# Patient Record
Sex: MALE | Race: BLACK OR AFRICAN AMERICAN | Employment: OTHER | URBAN - METROPOLITAN AREA
[De-identification: names, ages, dates, MRNs, and addresses within clinical notes are randomized per-mention and may not be internally consistent; named-entity substitution may affect disease eponyms.]

---

## 2017-08-22 ENCOUNTER — APPOINTMENT (OUTPATIENT)
Dept: INTERVENTIONAL RADIOLOGY/VASCULAR | Age: 82
DRG: 698 | End: 2017-08-22
Attending: INTERNAL MEDICINE
Payer: MEDICARE

## 2017-08-22 ENCOUNTER — HOSPITAL ENCOUNTER (INPATIENT)
Age: 82
LOS: 11 days | Discharge: NURSING, REHAB OR LONG-TERM CARE | DRG: 698 | End: 2017-09-02
Attending: EMERGENCY MEDICINE | Admitting: INTERNAL MEDICINE
Payer: MEDICARE

## 2017-08-22 ENCOUNTER — APPOINTMENT (OUTPATIENT)
Dept: ULTRASOUND IMAGING | Age: 82
DRG: 698 | End: 2017-08-22
Attending: INTERNAL MEDICINE
Payer: MEDICARE

## 2017-08-22 ENCOUNTER — APPOINTMENT (OUTPATIENT)
Dept: GENERAL RADIOLOGY | Age: 82
DRG: 698 | End: 2017-08-22
Attending: EMERGENCY MEDICINE
Payer: MEDICARE

## 2017-08-22 ENCOUNTER — APPOINTMENT (OUTPATIENT)
Dept: INTERVENTIONAL RADIOLOGY/VASCULAR | Age: 82
DRG: 698 | End: 2017-08-22
Attending: EMERGENCY MEDICINE
Payer: MEDICARE

## 2017-08-22 DIAGNOSIS — R06.02 SHORTNESS OF BREATH: ICD-10-CM

## 2017-08-22 DIAGNOSIS — R63.30 FEEDING DIFFICULTIES: ICD-10-CM

## 2017-08-22 DIAGNOSIS — N39.0 URINARY TRACT INFECTION WITHOUT HEMATURIA, SITE UNSPECIFIED: ICD-10-CM

## 2017-08-22 DIAGNOSIS — Z66 DO NOT RESUSCITATE: ICD-10-CM

## 2017-08-22 DIAGNOSIS — E87.5 ACUTE HYPERKALEMIA: ICD-10-CM

## 2017-08-22 DIAGNOSIS — A41.9 SEPSIS, DUE TO UNSPECIFIED ORGANISM: Primary | ICD-10-CM

## 2017-08-22 DIAGNOSIS — R53.81 DEBILITY: ICD-10-CM

## 2017-08-22 DIAGNOSIS — L89.90 PRESSURE ULCER, UNSPECIFIED LOCATION, UNSPECIFIED ULCER STAGE: ICD-10-CM

## 2017-08-22 DIAGNOSIS — N17.9 ACUTE RENAL FAILURE, UNSPECIFIED ACUTE RENAL FAILURE TYPE (HCC): ICD-10-CM

## 2017-08-22 PROBLEM — T83.511A UTI (URINARY TRACT INFECTION) DUE TO URINARY INDWELLING CATHETER (HCC): Status: ACTIVE | Noted: 2017-08-22

## 2017-08-22 LAB
ALBUMIN SERPL-MCNC: 1.7 G/DL (ref 3.5–5)
ALBUMIN/GLOB SERPL: 0.3 {RATIO} (ref 1.1–2.2)
ALP SERPL-CCNC: 333 U/L (ref 45–117)
ALT SERPL-CCNC: 763 U/L (ref 12–78)
ANION GAP SERPL CALC-SCNC: 10 MMOL/L (ref 5–15)
ANION GAP SERPL CALC-SCNC: 6 MMOL/L (ref 5–15)
ANION GAP SERPL CALC-SCNC: 7 MMOL/L (ref 5–15)
APPEARANCE UR: ABNORMAL
ARTERIAL PATENCY WRIST A: YES
AST SERPL-CCNC: 893 U/L (ref 15–37)
ATRIAL RATE: 74 BPM
BACTERIA URNS QL MICRO: ABNORMAL /HPF
BASE EXCESS BLD CALC-SCNC: 1 MMOL/L
BASOPHILS # BLD: 0 K/UL (ref 0–0.1)
BASOPHILS NFR BLD: 0 % (ref 0–1)
BDY SITE: ABNORMAL
BILIRUB SERPL-MCNC: 0.5 MG/DL (ref 0.2–1)
BILIRUB UR QL: NEGATIVE
BUN SERPL-MCNC: 167 MG/DL (ref 6–20)
BUN SERPL-MCNC: 181 MG/DL (ref 6–20)
BUN SERPL-MCNC: 59 MG/DL (ref 6–20)
BUN/CREAT SERPL: 31 (ref 12–20)
BUN/CREAT SERPL: 37 (ref 12–20)
BUN/CREAT SERPL: 38 (ref 12–20)
CALCIUM SERPL-MCNC: 7.7 MG/DL (ref 8.5–10.1)
CALCIUM SERPL-MCNC: 8.4 MG/DL (ref 8.5–10.1)
CALCIUM SERPL-MCNC: 8.9 MG/DL (ref 8.5–10.1)
CALCULATED R AXIS, ECG10: -74 DEGREES
CALCULATED T AXIS, ECG11: 51 DEGREES
CHLORIDE SERPL-SCNC: 105 MMOL/L (ref 97–108)
CHLORIDE SERPL-SCNC: 123 MMOL/L (ref 97–108)
CHLORIDE SERPL-SCNC: 128 MMOL/L (ref 97–108)
CK SERPL-CCNC: 25 U/L (ref 39–308)
CO2 SERPL-SCNC: 25 MMOL/L (ref 21–32)
CO2 SERPL-SCNC: 26 MMOL/L (ref 21–32)
CO2 SERPL-SCNC: 28 MMOL/L (ref 21–32)
COLOR UR: ABNORMAL
CREAT SERPL-MCNC: 1.9 MG/DL (ref 0.7–1.3)
CREAT SERPL-MCNC: 4.52 MG/DL (ref 0.7–1.3)
CREAT SERPL-MCNC: 4.71 MG/DL (ref 0.7–1.3)
DIAGNOSIS, 93000: NORMAL
DIFFERENTIAL METHOD BLD: ABNORMAL
EOSINOPHIL # BLD: 0.2 K/UL (ref 0–0.4)
EOSINOPHIL NFR BLD: 1 % (ref 0–7)
EPITH CASTS URNS QL MICRO: ABNORMAL /LPF
ERYTHROCYTE [DISTWIDTH] IN BLOOD BY AUTOMATED COUNT: 17.3 % (ref 11.5–14.5)
GAS FLOW.O2 O2 DELIVERY SYS: ABNORMAL L/MIN
GAS FLOW.O2 SETTING OXYMISER: 14 BPM
GLOBULIN SER CALC-MCNC: 6.8 G/DL (ref 2–4)
GLUCOSE SERPL-MCNC: 135 MG/DL (ref 65–100)
GLUCOSE SERPL-MCNC: 137 MG/DL (ref 65–100)
GLUCOSE SERPL-MCNC: 192 MG/DL (ref 65–100)
GLUCOSE UR STRIP.AUTO-MCNC: NEGATIVE MG/DL
HCO3 BLD-SCNC: 26 MMOL/L (ref 22–26)
HCT VFR BLD AUTO: 33.1 % (ref 36.6–50.3)
HGB BLD-MCNC: 9.5 G/DL (ref 12.1–17)
HGB UR QL STRIP: ABNORMAL
KETONES UR QL STRIP.AUTO: NEGATIVE MG/DL
LACTATE SERPL-SCNC: 2.9 MMOL/L (ref 0.4–2)
LEUKOCYTE ESTERASE UR QL STRIP.AUTO: ABNORMAL
LYMPHOCYTES # BLD: 1.8 K/UL (ref 0.8–3.5)
LYMPHOCYTES NFR BLD: 9 % (ref 12–49)
MAGNESIUM SERPL-MCNC: 2.9 MG/DL (ref 1.6–2.4)
MCH RBC QN AUTO: 27.4 PG (ref 26–34)
MCHC RBC AUTO-ENTMCNC: 28.7 G/DL (ref 30–36.5)
MCV RBC AUTO: 95.4 FL (ref 80–99)
MONOCYTES # BLD: 1 K/UL (ref 0–1)
MONOCYTES NFR BLD: 5 % (ref 5–13)
NEUTS BAND NFR BLD MANUAL: 1 % (ref 0–6)
NEUTS SEG # BLD: 17.2 K/UL (ref 1.8–8)
NEUTS SEG NFR BLD: 84 % (ref 32–75)
NITRITE UR QL STRIP.AUTO: NEGATIVE
O2/TOTAL GAS SETTING VFR VENT: 100 %
PCO2 BLD: 42.6 MMHG (ref 35–45)
PEEP RESPIRATORY: 5 CMH2O
PH BLD: 7.39 [PH] (ref 7.35–7.45)
PH UR STRIP: 7 [PH] (ref 5–8)
PHOSPHATE SERPL-MCNC: 4.9 MG/DL (ref 2.6–4.7)
PLATELET # BLD AUTO: 413 K/UL (ref 150–400)
PO2 BLD: 296 MMHG (ref 80–100)
POTASSIUM SERPL-SCNC: 4.1 MMOL/L (ref 3.5–5.1)
POTASSIUM SERPL-SCNC: 6.8 MMOL/L (ref 3.5–5.1)
POTASSIUM SERPL-SCNC: 8.6 MMOL/L (ref 3.5–5.1)
PROT SERPL-MCNC: 8.5 G/DL (ref 6.4–8.2)
PROT UR STRIP-MCNC: 100 MG/DL
Q-T INTERVAL, ECG07: 466 MS
QRS DURATION, ECG06: 144 MS
QTC CALCULATION (BEZET), ECG08: 469 MS
RBC # BLD AUTO: 3.47 M/UL (ref 4.1–5.7)
RBC #/AREA URNS HPF: ABNORMAL /HPF (ref 0–5)
RBC MORPH BLD: ABNORMAL
SAO2 % BLD: 100 % (ref 92–97)
SODIUM SERPL-SCNC: 143 MMOL/L (ref 136–145)
SODIUM SERPL-SCNC: 155 MMOL/L (ref 136–145)
SODIUM SERPL-SCNC: 160 MMOL/L (ref 136–145)
SP GR UR REFRACTOMETRY: 1.01 (ref 1–1.03)
SPECIMEN TYPE: ABNORMAL
TROPONIN I SERPL-MCNC: 0.08 NG/ML
UA: UC IF INDICATED,UAUC: ABNORMAL
UROBILINOGEN UR QL STRIP.AUTO: 0.2 EU/DL (ref 0.2–1)
VENTILATION MODE VENT: ABNORMAL
VENTRICULAR RATE, ECG03: 61 BPM
VOLUME CONTROL IVLC: YES
VT SETTING VENT: 500 ML
WBC # BLD AUTO: 20.2 K/UL (ref 4.1–11.1)
WBC URNS QL MICRO: ABNORMAL /HPF (ref 0–4)

## 2017-08-22 PROCEDURE — 77030013140 HC MSK NEB VYRM -A

## 2017-08-22 PROCEDURE — 36556 INSERT NON-TUNNEL CV CATH: CPT

## 2017-08-22 PROCEDURE — 93005 ELECTROCARDIOGRAM TRACING: CPT

## 2017-08-22 PROCEDURE — 74011636637 HC RX REV CODE- 636/637: Performed by: EMERGENCY MEDICINE

## 2017-08-22 PROCEDURE — 74011000250 HC RX REV CODE- 250: Performed by: EMERGENCY MEDICINE

## 2017-08-22 PROCEDURE — 02HV33Z INSERTION OF INFUSION DEVICE INTO SUPERIOR VENA CAVA, PERCUTANEOUS APPROACH: ICD-10-PCS | Performed by: INTERNAL MEDICINE

## 2017-08-22 PROCEDURE — 74011250636 HC RX REV CODE- 250/636: Performed by: INTERNAL MEDICINE

## 2017-08-22 PROCEDURE — 96374 THER/PROPH/DIAG INJ IV PUSH: CPT

## 2017-08-22 PROCEDURE — 71010 XR CHEST PORT: CPT

## 2017-08-22 PROCEDURE — 94640 AIRWAY INHALATION TREATMENT: CPT

## 2017-08-22 PROCEDURE — 74011250636 HC RX REV CODE- 250/636

## 2017-08-22 PROCEDURE — 74011000250 HC RX REV CODE- 250: Performed by: INTERNAL MEDICINE

## 2017-08-22 PROCEDURE — 96365 THER/PROPH/DIAG IV INF INIT: CPT

## 2017-08-22 PROCEDURE — 77030034850

## 2017-08-22 PROCEDURE — 87040 BLOOD CULTURE FOR BACTERIA: CPT | Performed by: EMERGENCY MEDICINE

## 2017-08-22 PROCEDURE — 96375 TX/PRO/DX INJ NEW DRUG ADDON: CPT

## 2017-08-22 PROCEDURE — 74011250636 HC RX REV CODE- 250/636: Performed by: EMERGENCY MEDICINE

## 2017-08-22 PROCEDURE — 81001 URINALYSIS AUTO W/SCOPE: CPT | Performed by: EMERGENCY MEDICINE

## 2017-08-22 PROCEDURE — 87077 CULTURE AEROBIC IDENTIFY: CPT | Performed by: EMERGENCY MEDICINE

## 2017-08-22 PROCEDURE — 90935 HEMODIALYSIS ONE EVALUATION: CPT | Performed by: NURSE PRACTITIONER

## 2017-08-22 PROCEDURE — C1892 INTRO/SHEATH,FIXED,PEEL-AWAY: HCPCS

## 2017-08-22 PROCEDURE — 74011250637 HC RX REV CODE- 250/637: Performed by: INTERNAL MEDICINE

## 2017-08-22 PROCEDURE — 85025 COMPLETE CBC W/AUTO DIFF WBC: CPT | Performed by: EMERGENCY MEDICINE

## 2017-08-22 PROCEDURE — 87205 SMEAR GRAM STAIN: CPT | Performed by: EMERGENCY MEDICINE

## 2017-08-22 PROCEDURE — 5A1955Z RESPIRATORY VENTILATION, GREATER THAN 96 CONSECUTIVE HOURS: ICD-10-PCS | Performed by: EMERGENCY MEDICINE

## 2017-08-22 PROCEDURE — 80048 BASIC METABOLIC PNL TOTAL CA: CPT | Performed by: INTERNAL MEDICINE

## 2017-08-22 PROCEDURE — 84100 ASSAY OF PHOSPHORUS: CPT | Performed by: INTERNAL MEDICINE

## 2017-08-22 PROCEDURE — 80053 COMPREHEN METABOLIC PANEL: CPT | Performed by: EMERGENCY MEDICINE

## 2017-08-22 PROCEDURE — 36591 DRAW BLOOD OFF VENOUS DEVICE: CPT

## 2017-08-22 PROCEDURE — 83605 ASSAY OF LACTIC ACID: CPT | Performed by: EMERGENCY MEDICINE

## 2017-08-22 PROCEDURE — 76770 US EXAM ABDO BACK WALL COMP: CPT

## 2017-08-22 PROCEDURE — 36600 WITHDRAWAL OF ARTERIAL BLOOD: CPT

## 2017-08-22 PROCEDURE — 94002 VENT MGMT INPAT INIT DAY: CPT

## 2017-08-22 PROCEDURE — 87086 URINE CULTURE/COLONY COUNT: CPT | Performed by: EMERGENCY MEDICINE

## 2017-08-22 PROCEDURE — 77030018719 HC DRSG PTCH ANTIMIC J&J -A

## 2017-08-22 PROCEDURE — 5A1D00Z PERFORMANCE OF URINARY FILTRATION, SINGLE: ICD-10-PCS | Performed by: INTERNAL MEDICINE

## 2017-08-22 PROCEDURE — 86923 COMPATIBILITY TEST ELECTRIC: CPT | Performed by: HOSPITALIST

## 2017-08-22 PROCEDURE — 86900 BLOOD TYPING SEROLOGIC ABO: CPT | Performed by: HOSPITALIST

## 2017-08-22 PROCEDURE — 05HM33Z INSERTION OF INFUSION DEVICE INTO RIGHT INTERNAL JUGULAR VEIN, PERCUTANEOUS APPROACH: ICD-10-PCS | Performed by: INTERNAL MEDICINE

## 2017-08-22 PROCEDURE — C1752 CATH,HEMODIALYSIS,SHORT-TERM: HCPCS

## 2017-08-22 PROCEDURE — 96361 HYDRATE IV INFUSION ADD-ON: CPT

## 2017-08-22 PROCEDURE — 82803 BLOOD GASES ANY COMBINATION: CPT

## 2017-08-22 PROCEDURE — 87070 CULTURE OTHR SPECIMN AEROBIC: CPT | Performed by: EMERGENCY MEDICINE

## 2017-08-22 PROCEDURE — 74011250637 HC RX REV CODE- 250/637: Performed by: SURGERY

## 2017-08-22 PROCEDURE — 77030011256 HC DRSG MEPILEX <16IN NO BORD MOLN -A

## 2017-08-22 PROCEDURE — 74011000258 HC RX REV CODE- 258: Performed by: INTERNAL MEDICINE

## 2017-08-22 PROCEDURE — 87186 SC STD MICRODIL/AGAR DIL: CPT | Performed by: EMERGENCY MEDICINE

## 2017-08-22 PROCEDURE — 82550 ASSAY OF CK (CPK): CPT | Performed by: INTERNAL MEDICINE

## 2017-08-22 PROCEDURE — 84484 ASSAY OF TROPONIN QUANT: CPT | Performed by: EMERGENCY MEDICINE

## 2017-08-22 PROCEDURE — 36415 COLL VENOUS BLD VENIPUNCTURE: CPT | Performed by: INTERNAL MEDICINE

## 2017-08-22 PROCEDURE — 51702 INSERT TEMP BLADDER CATH: CPT

## 2017-08-22 PROCEDURE — 74011000258 HC RX REV CODE- 258: Performed by: EMERGENCY MEDICINE

## 2017-08-22 PROCEDURE — 99285 EMERGENCY DEPT VISIT HI MDM: CPT

## 2017-08-22 PROCEDURE — 96367 TX/PROPH/DG ADDL SEQ IV INF: CPT

## 2017-08-22 PROCEDURE — 74011000250 HC RX REV CODE- 250

## 2017-08-22 PROCEDURE — 83735 ASSAY OF MAGNESIUM: CPT | Performed by: INTERNAL MEDICINE

## 2017-08-22 PROCEDURE — C1751 CATH, INF, PER/CENT/MIDLINE: HCPCS

## 2017-08-22 PROCEDURE — 65610000006 HC RM INTENSIVE CARE

## 2017-08-22 RX ORDER — ATROPINE SULFATE 10 MG/ML
2 SOLUTION/ DROPS OPHTHALMIC
COMMUNITY

## 2017-08-22 RX ORDER — DEXTROSE 50 % IN WATER (D50W) INTRAVENOUS SYRINGE
25
Status: COMPLETED | OUTPATIENT
Start: 2017-08-22 | End: 2017-08-22

## 2017-08-22 RX ORDER — PROMETHAZINE HYDROCHLORIDE 25 MG/1
25 SUPPOSITORY RECTAL
COMMUNITY

## 2017-08-22 RX ORDER — SODIUM POLYSTYRENE SULFONATE 15 G/60ML
45 SUSPENSION ORAL; RECTAL
Status: DISPENSED | OUTPATIENT
Start: 2017-08-22 | End: 2017-08-23

## 2017-08-22 RX ORDER — GUAIFENESIN 100 MG/5ML
81 LIQUID (ML) ORAL DAILY
Status: DISCONTINUED | OUTPATIENT
Start: 2017-08-23 | End: 2017-09-02 | Stop reason: HOSPADM

## 2017-08-22 RX ORDER — HEPARIN SODIUM 5000 [USP'U]/ML
5000 INJECTION, SOLUTION INTRAVENOUS; SUBCUTANEOUS EVERY 8 HOURS
Status: DISCONTINUED | OUTPATIENT
Start: 2017-08-22 | End: 2017-09-02 | Stop reason: HOSPADM

## 2017-08-22 RX ORDER — LIDOCAINE HYDROCHLORIDE 20 MG/ML
INJECTION, SOLUTION INFILTRATION; PERINEURAL
Status: COMPLETED
Start: 2017-08-22 | End: 2017-08-22

## 2017-08-22 RX ORDER — HYDROCODONE BITARTRATE AND ACETAMINOPHEN 5; 325 MG/1; MG/1
1 TABLET ORAL
Status: DISCONTINUED | OUTPATIENT
Start: 2017-08-22 | End: 2017-09-02 | Stop reason: HOSPADM

## 2017-08-22 RX ORDER — IPRATROPIUM BROMIDE AND ALBUTEROL SULFATE 2.5; .5 MG/3ML; MG/3ML
3 SOLUTION RESPIRATORY (INHALATION) EVERY 6 HOURS
COMMUNITY

## 2017-08-22 RX ORDER — SODIUM CHLORIDE 0.9 % (FLUSH) 0.9 %
5-10 SYRINGE (ML) INJECTION EVERY 8 HOURS
Status: DISCONTINUED | OUTPATIENT
Start: 2017-08-22 | End: 2017-09-02 | Stop reason: HOSPADM

## 2017-08-22 RX ORDER — LORAZEPAM 0.5 MG/1
0.5 TABLET ORAL
COMMUNITY

## 2017-08-22 RX ORDER — SODIUM CHLORIDE 0.9 % (FLUSH) 0.9 %
5-10 SYRINGE (ML) INJECTION AS NEEDED
Status: DISCONTINUED | OUTPATIENT
Start: 2017-08-22 | End: 2017-09-02 | Stop reason: HOSPADM

## 2017-08-22 RX ORDER — ACETAMINOPHEN 650 MG/1
650 SUPPOSITORY RECTAL
Status: DISCONTINUED | OUTPATIENT
Start: 2017-08-22 | End: 2017-09-02 | Stop reason: HOSPADM

## 2017-08-22 RX ORDER — ONDANSETRON 4 MG/1
4 TABLET, ORALLY DISINTEGRATING ORAL
Status: DISCONTINUED | OUTPATIENT
Start: 2017-08-22 | End: 2017-09-02 | Stop reason: HOSPADM

## 2017-08-22 RX ORDER — GUAIFENESIN 100 MG/5ML
81 LIQUID (ML) ORAL DAILY
COMMUNITY

## 2017-08-22 RX ORDER — SODIUM BICARBONATE 1 MEQ/ML
50 SYRINGE (ML) INTRAVENOUS
Status: COMPLETED | OUTPATIENT
Start: 2017-08-22 | End: 2017-08-22

## 2017-08-22 RX ORDER — ACETAMINOPHEN 650 MG/1
650 SUPPOSITORY RECTAL
COMMUNITY

## 2017-08-22 RX ORDER — ALBUTEROL SULFATE 0.83 MG/ML
2.5 SOLUTION RESPIRATORY (INHALATION)
Status: COMPLETED | OUTPATIENT
Start: 2017-08-22 | End: 2017-08-22

## 2017-08-22 RX ORDER — LIDOCAINE HYDROCHLORIDE 20 MG/ML
20 INJECTION, SOLUTION INFILTRATION; PERINEURAL
Status: COMPLETED | OUTPATIENT
Start: 2017-08-22 | End: 2017-08-22

## 2017-08-22 RX ORDER — CHLORHEXIDINE GLUCONATE 1.2 MG/ML
15 RINSE ORAL
COMMUNITY

## 2017-08-22 RX ORDER — ATORVASTATIN CALCIUM 40 MG/1
40 TABLET, FILM COATED ORAL
COMMUNITY

## 2017-08-22 RX ORDER — ACETAMINOPHEN 325 MG/1
650 TABLET ORAL
Status: DISCONTINUED | OUTPATIENT
Start: 2017-08-22 | End: 2017-09-02 | Stop reason: HOSPADM

## 2017-08-22 RX ORDER — SODIUM CHLORIDE 9 MG/ML
100 INJECTION, SOLUTION INTRAVENOUS CONTINUOUS
Status: DISCONTINUED | OUTPATIENT
Start: 2017-08-22 | End: 2017-08-24

## 2017-08-22 RX ORDER — FACIAL-BODY WIPES
10 EACH TOPICAL
COMMUNITY

## 2017-08-22 RX ORDER — CALCIUM GLUCONATE 94 MG/ML
1 INJECTION, SOLUTION INTRAVENOUS
Status: DISCONTINUED | OUTPATIENT
Start: 2017-08-22 | End: 2017-08-22 | Stop reason: SDUPTHER

## 2017-08-22 RX ORDER — HEPARIN SODIUM 1000 [USP'U]/ML
INJECTION, SOLUTION INTRAVENOUS; SUBCUTANEOUS
Status: COMPLETED
Start: 2017-08-22 | End: 2017-08-22

## 2017-08-22 RX ORDER — HEPARIN SODIUM 1000 [USP'U]/ML
10000 INJECTION, SOLUTION INTRAVENOUS; SUBCUTANEOUS
Status: COMPLETED | OUTPATIENT
Start: 2017-08-22 | End: 2017-08-22

## 2017-08-22 RX ORDER — IPRATROPIUM BROMIDE AND ALBUTEROL SULFATE 2.5; .5 MG/3ML; MG/3ML
3 SOLUTION RESPIRATORY (INHALATION)
Status: DISCONTINUED | OUTPATIENT
Start: 2017-08-22 | End: 2017-09-02 | Stop reason: HOSPADM

## 2017-08-22 RX ORDER — ESOMEPRAZOLE MAGNESIUM 40 MG/1
40 FOR SUSPENSION ORAL DAILY
COMMUNITY

## 2017-08-22 RX ADMIN — LIDOCAINE HYDROCHLORIDE 5 MG: 20 INJECTION, SOLUTION INFILTRATION; PERINEURAL at 13:57

## 2017-08-22 RX ADMIN — ALBUTEROL SULFATE 2.5 MG: 2.5 SOLUTION RESPIRATORY (INHALATION) at 11:42

## 2017-08-22 RX ADMIN — NOREPINEPHRINE BITARTRATE 6 MCG/MIN: 1 INJECTION, SOLUTION, CONCENTRATE INTRAVENOUS at 15:16

## 2017-08-22 RX ADMIN — IPRATROPIUM BROMIDE AND ALBUTEROL SULFATE 3 ML: .5; 3 SOLUTION RESPIRATORY (INHALATION) at 21:51

## 2017-08-22 RX ADMIN — CALCIUM GLUCONATE 1 G: 94 INJECTION, SOLUTION INTRAVENOUS at 13:17

## 2017-08-22 RX ADMIN — Medication 10 ML: at 22:00

## 2017-08-22 RX ADMIN — DEXTROSE MONOHYDRATE 25 G: 25 INJECTION, SOLUTION INTRAVENOUS at 11:56

## 2017-08-22 RX ADMIN — NOREPINEPHRINE BITARTRATE 15 MCG/MIN: 1 INJECTION, SOLUTION, CONCENTRATE INTRAVENOUS at 22:59

## 2017-08-22 RX ADMIN — PIPERACILLIN SODIUM,TAZOBACTAM SODIUM 3.38 G: 3; .375 INJECTION, POWDER, FOR SOLUTION INTRAVENOUS at 10:49

## 2017-08-22 RX ADMIN — HEPARIN SODIUM 3000 UNITS: 1000 INJECTION, SOLUTION INTRAVENOUS; SUBCUTANEOUS at 13:57

## 2017-08-22 RX ADMIN — COLLAGENASE SANTYL: 250 OINTMENT TOPICAL at 19:16

## 2017-08-22 RX ADMIN — SODIUM CHLORIDE 1000 ML: 900 INJECTION, SOLUTION INTRAVENOUS at 13:13

## 2017-08-22 RX ADMIN — HEPARIN SODIUM 5000 UNITS: 5000 INJECTION, SOLUTION INTRAVENOUS; SUBCUTANEOUS at 22:59

## 2017-08-22 RX ADMIN — CALCIUM GLUCONATE 1 G: 94 INJECTION, SOLUTION INTRAVENOUS at 11:55

## 2017-08-22 RX ADMIN — Medication 10 ML: at 15:07

## 2017-08-22 RX ADMIN — HEPARIN SODIUM 5000 UNITS: 5000 INJECTION, SOLUTION INTRAVENOUS; SUBCUTANEOUS at 15:06

## 2017-08-22 RX ADMIN — PANTOPRAZOLE SODIUM 40 MG: 40 TABLET, DELAYED RELEASE ORAL at 16:06

## 2017-08-22 RX ADMIN — SODIUM CHLORIDE 1000 ML: 900 INJECTION, SOLUTION INTRAVENOUS at 10:48

## 2017-08-22 RX ADMIN — SODIUM CHLORIDE 100 ML/HR: 900 INJECTION, SOLUTION INTRAVENOUS at 19:14

## 2017-08-22 RX ADMIN — SODIUM CHLORIDE 1000 ML: 900 INJECTION, SOLUTION INTRAVENOUS at 11:33

## 2017-08-22 RX ADMIN — NOREPINEPHRINE BITARTRATE 4 MCG/MIN: 1 INJECTION, SOLUTION, CONCENTRATE INTRAVENOUS at 13:33

## 2017-08-22 RX ADMIN — SODIUM BICARBONATE 50 MEQ: 84 INJECTION INTRAVENOUS at 11:56

## 2017-08-22 RX ADMIN — HUMAN INSULIN 10 UNITS: 100 INJECTION, SOLUTION SUBCUTANEOUS at 11:55

## 2017-08-22 NOTE — CONSULTS
[facility]  Pulmonary, Critical Care, and Sleep Medicine    Initial Patient Consult    Name: Gil Tepmle MRN: [id number]   : 11/10/1933 Hospital: [facility]    Date: 2017        IMPRESSION:   · Sepsis- suspect from large sacral ulcer- at risk for MRSA/ESBLs etc  · Chronic trach with Acute VDRF- CXR clear. Basilar ATX. · FREDERICK on CKD  · Hyperkalemia  · H/o CABG  · H/o PEA arrest  · Chronic trach      RECOMMENDATIONS:   · Zosyn/levo/vanco  · Lung protective ventilation  · Surgery consult for wound  · contact isolation  · I have called renal for STAT HD  · analgosedation with PRN fent  · To ICU  · FULL CODE  · Pt is critically ill CCT EOP 30 min. Subjective: This patient has been seen and evaluated at the request of Dr. Heriberto Greene for ICU mgmt. Patient is a 80 y.o. male frm SNF with chronic trach sent to [facility] ED for low O2 sats and hypotension. Pt here with SBP into 70s and sats into 80s. Placed on vent. Given 3 liters NS ans zosyn. SBP now 110. On VACV. Obtunded on vent. Past Medical History:   Diagnosis Date    CAD (coronary artery disease)     Hx of CABG       History reviewed. No pertinent surgical history. Prior to Admission medications    Medication Sig Start Date End Date Taking? Authorizing Provider   promethazine (PHENERGAN) 25 mg suppository Insert 25 mg into rectum every four (4) hours as needed for Nausea. Yes Historical Provider   bisacodyl (BISCOLAX) 10 mg suppository Insert 10 mg into rectum daily as needed. Yes Historical Provider   nut.tx.gluc.intol,lac-free,soy (GLUCERNA 1.5 RJ) liqd by PEG Tube route. 60cc/h   Yes Historical Provider   acetaminophen (TYLENOL) 650 mg suppository Insert 650 mg into rectum every four (4) hours as needed for Fever. Yes Historical Provider   chlorhexidine (PERIDEX) 0.12 % solution 15 mL by Swish and Spit route two (2) times daily as needed (oral care).    Yes Historical Provider   aspirin 81 mg chewable tablet 81 mg by PEG Tube route daily. Yes Historical Provider   atorvastatin (LIPITOR) 40 mg tablet 40 mg by PEG Tube route nightly. Indications: hyperlipidemia   Yes Historical Provider   albuterol-ipratropium (DUO-NEB) 2.5 mg-0.5 mg/3 ml nebu 3 mL by Nebulization route every six (6) hours. Indications: CHRONIC OBSTRUCTIVE PULMONARY DISEASE WITH BRONCHOSPASMS   Yes Historical Provider   esomeprazole (NEXIUM PACKET) 40 mg granules for oral suspension 40 mg by PEG Tube route daily. Indications: gastroesophageal reflux disease   Yes Historical Provider   MORPHINE SULFATE (ROXANOL CONCENTRATE PO) 5 mg by SubLINGual route every one (1) hour as needed for Pain (pain or sob). Yes Historical Provider   LORazepam (ATIVAN) 0.5 mg tablet 0.5 mg by PEG Tube route every four (4) hours as needed for Anxiety. Yes Historical Provider   atropine 1 % ophthalmic solution 2 Drops by SubLINGual route every one (1) hour as needed (secreations). Yes Historical Provider     No Known Allergies   Social History   Substance Use Topics    Smoking status: Not on file    Smokeless tobacco: Not on file    Alcohol use Not on file      History reviewed. No pertinent family history. Current Facility-Administered Medications   Medication Dose Route Frequency    sodium chloride 0.9 % bolus infusion 1,000 mL  1,000 mL IntraVENous NOW    calcium gluconate 1 g in 0.9% sodium chloride 100 mL IVPB  1 g IntraVENous ONCE       Review of Systems:  Review of systems not obtained due to patient factors.     Objective:   Vital Signs:    Visit Vitals    /57    Pulse 90    Temp 98.6 °F (37 °C)    Resp 14    SpO2 98%       O2 Device: Ventilator, Tracheostomy       Temp (24hrs), Av.7 °F (37.1 °C), Min:98.6 °F (37 °C), Max:98.8 °F (37.1 °C)       Intake/Output:   Last shift:       0701 -  1900  In: -   Out: 5 [Urine:5]  Last 3 shifts:      Intake/Output Summary (Last 24 hours) at 17 1201  Last data filed at 17 1059   Gross per 24 hour   Intake                0 ml   Output                5 ml   Net               -5 ml      Physical Exam:   General:  Unresponsive on vent   Head:  Normocephalic,    Eyes:  Conjunctivae/corneas clear. Nose: Nares normal. Septum midline. Mucosa normal.    Throat: Lips, mucosa, and tongue normal.    Neck: Supple, symmetrical, trachea midline, no adenopathy, thyroid. Lungs:   Clear to auscultation bilaterally. Heart:  Regular rate and rhythm, S1, S2 normal,    Abdomen:   Soft, non-tender. Bowel sounds normal.    Extremities: Extremities normal, atraumatic, no cyanosis or edema. Pulses: 2+ and symmetric all extremities. Skin: Skin color, texture, turgor normal. No rashes or lesions             Data review:     Recent Results (from the past 24 hour(s))   CULTURE, WOUND W GRAM STAIN    Collection Time: 08/22/17  9:58 AM   Result Value Ref Range    Special Requests: NO SPECIAL REQUESTS      GRAM STAIN 4+ GRAM POSITIVE COCCI      GRAM STAIN OCCASIONAL GRAM NEGATIVE RODS      GRAM STAIN FEW GRAM POSITIVE RODS (CORYNEFORM)      Culture result: PENDING    CBC WITH AUTOMATED DIFF    Collection Time: 08/22/17 10:00 AM   Result Value Ref Range    WBC 20.2 (H) 4.1 - 11.1 K/uL    RBC 3.47 (L) 4.10 - 5.70 M/uL    HGB 9.5 (L) 12.1 - 17.0 g/dL    HCT 33.1 (L) 36.6 - 50.3 %    MCV 95.4 80.0 - 99.0 FL    MCH 27.4 26.0 - 34.0 PG    MCHC 28.7 (L) 30.0 - 36.5 g/dL    RDW 17.3 (H) 11.5 - 14.5 %    PLATELET 515 (H) 456 - 400 K/uL    NEUTROPHILS 84 (H) 32 - 75 %    BAND NEUTROPHILS 1 0 - 6 %    LYMPHOCYTES 9 (L) 12 - 49 %    MONOCYTES 5 5 - 13 %    EOSINOPHILS 1 0 - 7 %    BASOPHILS 0 0 - 1 %    ABS. NEUTROPHILS 17.2 (H) 1.8 - 8.0 K/UL    ABS. LYMPHOCYTES 1.8 0.8 - 3.5 K/UL    ABS. MONOCYTES 1.0 0.0 - 1.0 K/UL    ABS. EOSINOPHILS 0.2 0.0 - 0.4 K/UL    ABS.  BASOPHILS 0.0 0.0 - 0.1 K/UL    DF MANUAL      RBC COMMENTS ANISOCYTOSIS  1+       METABOLIC PANEL, COMPREHENSIVE    Collection Time: 08/22/17 10:00 AM   Result Value Ref Range    Sodium 155 (H) 136 - 145 mmol/L    Potassium 8.6 (HH) 3.5 - 5.1 mmol/L    Chloride 123 (H) 97 - 108 mmol/L    CO2 26 21 - 32 mmol/L    Anion gap 6 5 - 15 mmol/L    Glucose 137 (H) 65 - 100 mg/dL     (H) 6 - 20 MG/DL    Creatinine 4.71 (H) 0.70 - 1.30 MG/DL    BUN/Creatinine ratio 38 (H) 12 - 20      GFR est AA 14 (L) >60 ml/min/1.73m2    GFR est non-AA 12 (L) >60 ml/min/1.73m2    Calcium 8.9 8.5 - 10.1 MG/DL    Bilirubin, total 0.5 0.2 - 1.0 MG/DL    ALT (SGPT) 763 (H) 12 - 78 U/L    AST (SGOT) 893 (H) 15 - 37 U/L    Alk.  phosphatase 333 (H) 45 - 117 U/L    Protein, total 8.5 (H) 6.4 - 8.2 g/dL    Albumin 1.7 (L) 3.5 - 5.0 g/dL    Globulin 6.8 (H) 2.0 - 4.0 g/dL    A-G Ratio 0.3 (L) 1.1 - 2.2     LACTIC ACID    Collection Time: 08/22/17 10:00 AM   Result Value Ref Range    Lactic acid 2.9 (HH) 0.4 - 2.0 MMOL/L   TROPONIN I    Collection Time: 08/22/17 10:00 AM   Result Value Ref Range    Troponin-I, Qt. 0.08 (H) <0.05 ng/mL   EKG, 12 LEAD, INITIAL    Collection Time: 08/22/17 10:18 AM   Result Value Ref Range    Ventricular Rate 61 BPM    Atrial Rate 74 BPM    QRS Duration 144 ms    Q-T Interval 466 ms    QTC Calculation (Bezet) 469 ms    Calculated R Axis -74 degrees    Calculated T Axis 51 degrees    Diagnosis       Wide QRS rhythm  Left axis deviation  Right bundle branch block  No previous ECGs available     POC G3 - PUL    Collection Time: 08/22/17 10:28 AM   Result Value Ref Range    FIO2 (POC) 100 %    pH (POC) 7.393 7.35 - 7.45      pCO2 (POC) 42.6 35.0 - 45.0 MMHG    pO2 (POC) 296 (H) 80 - 100 MMHG    HCO3 (POC) 26.0 22 - 26 MMOL/L    sO2 (POC) 100 (H) 92 - 97 %    Base excess (POC) 1 mmol/L    Site LEFT BRACHIAL      Device: VENT      Mode ASSIST CONTROL      Tidal volume 500 ml    Set Rate 14 bpm    PEEP/CPAP (POC) 5.0 cmH2O    Allens test (POC) YES      Specimen type (POC) ARTERIAL      Volume control YES         Imaging:  I have personally reviewed the patients radiographs and have reviewed the reports:  PCXR clear        Eva Barnett MD

## 2017-08-22 NOTE — DIALYSIS
[facility]                         [phone]  Vitals Pre Post Assessment Pre Post   BP 89/78 151/84 LOC Obtunded Obtunded   HR 78 74 Lungs Course Coarse   Temp 97.6 97.6 Cardiac RRR RRR   Resp 18 18 Skin Warm and dry Warm and dry   Weight 61.1 kg 61.1 kg Edema none none      Pain No distress No distress     Orders   Duration: Start: 1545 End: 1845 Total: 3 hr   Dialyzer: Revaclear   K Bath: 1   Ca Bath: 2.5   Na / Bicarb: 140/35   Target Fluid Removal: 0     Access   Type & Location: RIJ temp CVC   Comments:                            Placement verified. +aspiration/+flush x 2     Labs   Hep B status / date: unknown   Obtained/Reviewed  Critical Results Called reviewed       Meds Given   Name Dose Route   none                 Total Liters Process: 50.0   Net Fluid Removed: 0      Comments   Time Out Done: yes   Primary Nurse Rpt Pre: Lay Northern, RN    Primary Nurse Rpt Post: Lay Talley RN    Pt Education: Spouse, procedure   Care Plan: HD per nephrology   Tx Summary: Treatment completed. Pt tolerated. All possible blood returned. Lines flushed and locked with NS. New caps. Dressing dry and intact.

## 2017-08-22 NOTE — ED NOTES
Bedside and Verbal shift change report given to Kamala Rogers and [address] [place] [address] (oncoming nurse) by Ninoska Florez RN (offgoing nurse). Report included the following information SBAR, ED Summary, MAR and Recent Results.

## 2017-08-22 NOTE — SENIOR SERVICES NOTE
Spoke with Misty at [address] [facility] and she will send a corrected face sheet. We have the correct patient identified in our system. Pt is open to [facility] office ([phone]) but remains a full code. I spoke with Racheal Sen at [facility] and she reports that patient was alert and oriented enough to say that he wanted CPR so remained a full code. She was updated on patient status and given main ER phone number.

## 2017-08-22 NOTE — PROGRESS NOTES
Admission Medication Reconciliation:    Information obtained from: Transfer papers    Significant PMH/Disease States:   Past Medical History:   Diagnosis Date    CAD (coronary artery disease)     Hx of CABG        Chief Complaint for this Admission:  respiratory distress    Allergies:  Review of patient's allergies indicates no known allergies. Prior to Admission Medications:   Prior to Admission Medications   Prescriptions Last Dose Informant Patient Reported? Taking? LORazepam (ATIVAN) 0.5 mg tablet   Yes Yes   Si.5 mg by PEG Tube route every four (4) hours as needed for Anxiety. MORPHINE SULFATE (ROXANOL CONCENTRATE PO)   Yes Yes   Si mg by SubLINGual route every one (1) hour as needed for Pain (pain or sob). acetaminophen (TYLENOL) 650 mg suppository   Yes Yes   Sig: Insert 650 mg into rectum every four (4) hours as needed for Fever. albuterol-ipratropium (DUO-NEB) 2.5 mg-0.5 mg/3 ml nebu   Yes Yes   Sig: 3 mL by Nebulization route every six (6) hours. Indications: CHRONIC OBSTRUCTIVE PULMONARY DISEASE WITH BRONCHOSPASMS   aspirin 81 mg chewable tablet   Yes Yes   Si mg by PEG Tube route daily. atorvastatin (LIPITOR) 40 mg tablet   Yes Yes   Si mg by PEG Tube route nightly. Indications: hyperlipidemia   atropine 1 % ophthalmic solution   Yes Yes   Si Drops by SubLINGual route every one (1) hour as needed (secreations). bisacodyl (BISCOLAX) 10 mg suppository   Yes Yes   Sig: Insert 10 mg into rectum daily as needed. chlorhexidine (PERIDEX) 0.12 % solution   Yes Yes   Sig: 15 mL by Swish and Spit route two (2) times daily as needed (oral care). esomeprazole (NEXIUM PACKET) 40 mg granules for oral suspension   Yes Yes   Si mg by PEG Tube route daily. Indications: gastroesophageal reflux disease   insulin regular (NOVOLIN R, HUMULIN R) 100 unit/mL injection   Yes Yes   Sig: by SubCUTAneous route.  151-200 = 2 units  201-250 = 4 units  251-300 = 6 units  301-350 = 9 units  351-400 = 10 units   promethazine (PHENERGAN) 25 mg suppository   Yes Yes   Sig: Insert 25 mg into rectum every four (4) hours as needed for Nausea. Facility-Administered Medications: None         Comments/Recommendations: added humulin.

## 2017-08-22 NOTE — IP AVS SNAPSHOT
[address] 
[phone] Patient: Elina Pham MRN: [id number] MQV:[id number] You are allergic to the following No active allergies Recent Documentation Height Weight BMI Smoking Status 1.829 m 68.8 kg 20.57 kg/m2 Never Assessed Unresulted Labs Order Current Status SAMPLE TO BLOOD BANK In process SAMPLE TO BLOOD BANK In process CULTURE, BLOOD Preliminary result Emergency Contacts Name Discharge Info Relation Home Work Mobile Dany Trejo [3] [phone] About your hospitalization You were admitted on:  August 22, 2017 You last received care in the:  [address] [place] [address] 6S NEURO-SCI TELE You were discharged on:  September 2, 2017 Unit phone number:  [phone] Why you were hospitalized Your primary diagnosis was:  Not on File Your diagnoses also included:  Uti (Urinary Tract Infection) Due To Urinary Indwelling Catheter (Hcc), Sepsis Affecting Skin Providers Seen During Your Hospitalizations Provider Role Specialty Primary office phone Saima Leija MD Attending Provider Emergency Medicine [phone] Macie Moran MD Attending Provider Internal Medicine [phone] Nola Garcia MD Attending Provider Internal Medicine [phone] Lieutenant Jer MD Attending Provider Internal Medicine [phone] Ashlyn Hays MD Attending Provider Family Practice [phone] Aziza Bennett MD Attending Provider Internal Medicine [phone] Your Primary Care Physician (PCP) Primary Care Physician Office Phone Office Fax Yoel Humphries [phone] Follow-up Information Follow up With Details Comments Contact Info Tomasz Leigh MD   [address] [place] [address] [address] Ritu Ricci [address] [phone] Current Discharge Medication List  
  
CONTINUE these medications which have NOT CHANGED Dose & Instructions Dispensing Information Comments Morning Noon Evening Bedtime  
 acetaminophen 650 mg suppository Commonly known as:  TYLENOL Your last dose was: Your next dose is:    
   
   
 Dose:  650 mg Insert 650 mg into rectum every four (4) hours as needed for Fever. Refills:  0  
     
   
   
   
  
 albuterol-ipratropium 2.5 mg-0.5 mg/3 ml Nebu Commonly known as:  Crystal  Your last dose was: Your next dose is:    
   
   
 Dose:  3 mL  
3 mL by Nebulization route every six (6) hours. Indications: CHRONIC OBSTRUCTIVE PULMONARY DISEASE WITH BRONCHOSPASMS Refills:  0  
     
   
   
   
  
 aspirin 81 mg chewable tablet Your last dose was: Your next dose is:    
   
   
 Dose:  81 mg  
81 mg by PEG Tube route daily. Refills:  0  
     
   
   
   
  
 ATIVAN 0.5 mg tablet Generic drug:  LORazepam  
   
Your last dose was: Your next dose is:    
   
   
 Dose:  0.5 mg  
0.5 mg by PEG Tube route every four (4) hours as needed for Anxiety. Refills:  0  
     
   
   
   
  
 atorvastatin 40 mg tablet Commonly known as:  LIPITOR Your last dose was: Your next dose is:    
   
   
 Dose:  40 mg  
40 mg by PEG Tube route nightly. Indications: hyperlipidemia Refills:  0  
     
   
   
   
  
 atropine 1 % ophthalmic solution Your last dose was: Your next dose is:    
   
   
 Dose:  2 Drop  
2 Drops by SubLINGual route every one (1) hour as needed (secreations). Refills:  0 BISCOLAX 10 mg suppository Generic drug:  bisacodyl Your last dose was: Your next dose is:    
   
   
 Dose:  10 mg Insert 10 mg into rectum daily as needed. Refills:  0  
     
   
   
   
  
 chlorhexidine 0.12 % solution Commonly known as:  PERIDEX Your last dose was: Your next dose is:    
   
   
 Dose:  15 mL  
15 mL by Swish and Spit route two (2) times daily as needed (oral care). Refills:  0  
     
   
   
   
  
 insulin regular 100 unit/mL injection Commonly known as:  [place] HUMULIN R Your last dose was: Your next dose is:    
   
   
 by SubCUTAneous route. 151-200 = 2 units 201-250 = 4 units 251-300 = 6 units 301-350 = 9 units 351-400 = 10 units Refills:  0 NexIUM Packet 40 mg granules for oral suspension Generic drug:  esomeprazole Your last dose was: Your next dose is:    
   
   
 Dose:  40 mg  
40 mg by PEG Tube route daily. Indications: gastroesophageal reflux disease Refills:  0 PHENERGAN 25 mg suppository Generic drug:  promethazine Your last dose was: Your next dose is:    
   
   
 Dose:  25 mg Insert 25 mg into rectum every four (4) hours as needed for Nausea. Refills:  0  
     
   
   
   
  
 ROXANOL CONCENTRATE PO Your last dose was: Your next dose is:    
   
   
 Dose:  5 mg  
5 mg by SubLINGual route every one (1) hour as needed for Pain (pain or sob). Refills:  0 ASK your doctor about these medications Dose & Instructions Dispensing Information Comments Morning Noon Evening Bedtime GLUCERNA 1.5 RJ Liqd Generic drug:  nut.tx.gluc.intol,lac-free,soy Your last dose was: Your next dose is:    
   
   
 by PEG Tube route. 60cc/h Refills:  0 Discharge Instructions Discharge SNF/Rehab Instructions/LTAC  
 
 
PATIENT ID: Ally Hall MRN: [id number] YOB: 1933 DATE OF ADMISSION: 8/22/2017  9:43 AM   
DATE OF DISCHARGE: 9/2/2017 PRIMARY CARE PROVIDER: Kishan Willson MD  
 
 
ATTENDING PHYSICIAN: Kelley Cruz MD 
DISCHARGING PROVIDER: Kelley Cruz MD    
 To contact this individual call [phone] and ask the  to page. If unavailable ask to be transferred the Adult Hospitalist Department. CONSULTATIONS: IP CONSULT TO HOSPITALIST 
IP CONSULT TO NEPHROLOGY 
IP CONSULT TO NEPHROLOGY 
IP CONSULT TO INTERVENTIONAL RADIOLOGY 
IP CONSULT TO PALLIATIVE CARE - PROVIDER 
IP CONSULT TO PALLIATIVE CARE - PROVIDER 
IP CONSULT TO INFECTIOUS DISEASES 
IP CONSULT TO GENERAL SURGERY 
 
PROCEDURES/SURGERIES: * No surgery found * [id number] [facility] COURSE:  
 
Admission Summary:  
  
The patient is an 66-year-old patient who originally was a resident of [place], [place]. He  
was admitted in [facility] on 06/04/2017 due to hypotension, hyperventilation and confusion. Since the patient was unable to be extubated while in the care in the ICU, then tracheostomy and PEG was done and [facility] From [facility] he had transferred to [facility] where he came from this admission. On the day of admission,in the morning the patient was noted to be in severe  shortness of breath, for which we will transfer the patient to our ER. While in our ER, the patient was connected to the ventilator through the tracheostomy. Subsequently he was diagnosed with wide spread ESBL E coli infection that involved the blood(bacteremia), lungs and urine. UTI being the most probable source. Assessment & Plan:  
  
Sepsis with septic shock with ESBL E coli bacteremia,pneumoni and UTI. The UTI the most probable source. -Patient required ICU care. Shock resolved and was transferred to a tele floor where he stayed to the date of discharge. 
-Infectious disease specialist recommended to continue intravenous antibiotics with Ertapenem. Repeat blood culture negative thus far 
-Midline was placed on 9/1/17,a day prior to discharge. SNF  IV Orders 1. Diagnosis:  Bacteremia 2.  Routine PICC care 3. Antibiotic:  Ertapenem 500 mg IV Q 24 hours 4. Lab each Monday: CBC/diff/platelets 5. Lab each Thursday: CBC/diff/platelets 6. Fax lab to Dr. Penelope Gan @ [phone]. 
7.  Duration of therapy: 13 days and then remove PICC Kylie Morales MD 
  
 
ESBL E coli pneumonia: see above Acute and chronic renal failure,due to hypotension from sepsis/ATN 
-Patient required urgent dialysis on 8.22.17 and again on 8.24.17.Rusty placed on 8.22.17. After nephrology and palliative care discussions with patient's wife,no dialysis or escalation of care so dialysis therapy was stopped. Creatinine has been rising since and wife would consider hospice once in skilled nursing place. Hyperkalemic, hypernatremic, dehydration Improved. Acute on chronic hypoxic Respiratory failure likely due to sepsis/shock/pneumonia 
-Patient had tracheostomy prior to admission and was put on mechanical ventilation on admission. He was subsequently extubated to Trach collar and remained stable respirator wise on trach collar. Coronary artery disease Status post CABG. No acute issue. Decubital ulcer -PROVIDENCIA STUARTII Also sensitive to meropenem. Surgical services consulted, no debridement planned per surgery. Anemia Possibly from chronic disease monitor,he had one unit on 8/22. Hb stable between 7-8 range. Severe Protein Calorie Malnutrition 
-On Tube feeding Code status: DNR Prognosis:poor. With multiple several co morbidities,recent extensive hospitalization,now failing kidneys without signs of recovery. There was extensive discussion about course and prognosis with patient's wife by primary team, nephrologist and palliative care team,the goals of care moving forward are,per palliative care team  \"Goals clear for SNF/NH w/ IV abx until course completed and then enroll in hospice.  \" 
 
 
 
DISCHARGE DIAGNOSES / PLAN:   
 
 See above PENDING TEST RESULTS:  
At the time of discharge the following test results are still pending: final blood culture,negative to date for 3 days FOLLOW UP APPOINTMENTS:   
Follow-up Information Follow up With Details Comments Contact Info Debby Macario MD   [address] [place] [address] [address] Sheri Srivastava [address] 
[phone] ADDITIONAL CARE RECOMMENDATIONS:  
 
DIET: NPO,on tube feeding via PEG tube TUBE FEEDING INSTRUCTIONS:Suplena @ 38ml/hr continuous around the clock + 160ml q4hr of free water flush via PEG OXYGEN / BiPAP SETTINGS via tach collar ACTIVITY: Activity as tolerated WOUND CARE:  
  
1. POA, right upper back unstageable pressure injury (wife reports it has been present for about 4 months) = 1.5 x 1.3 x 0.1 cm  100% moist adherent yellow. no exudate. Periwound intact & without erythema. Cleaned with saline, Santyl and Mepilex border applied. 2. POA, sacral stage 4 pressure injury = 7.5 x 8 x 1 cm with undermining from 8-12 o'clock = 2.5 cm Base is 70% moist deep/bright pink 30% scattered yellow with some connective tissue noted. No erupting bone palpated but not far from the surface. No odor. Periwound intact and shiny, blanching pink. Scant serusanguious exudate. Devitalized margin proximally. Cleaned with saline, Santyl applied to base and then moist packing placed. Covered with large adhesive island dressing to protect form stool . 3. Left dorsal wrist skin tear = 2.5 x 2 x 0.1 cm 100% moist pink with scant bleeding. Cleaned with saline, petroleum ointment applied and covered with Mepilex border. 4. Chest toward left side, ulceration with unknown etiology (sternal wires are readily palpable under skin) = 1.3 x 1.3 x 0.2 cm 100% moist red with scant bleeding. Periwound intact and without erythema. Cleaned with saline, Aquacel Ag applied with a few drops of saline to activate silver, covered with Mepilex border. Recommendations:   
Back and sacrum: apply Santyl daily. Cover back with Mepilex border and sacrum with moist packing and dry cover. Chest: apply Aquacel Ag every three days with a few drops of saline to moisten, dry cover. Left wrist: every three days apply petroleum ointment and Mepilex border Skin Care & Pressure Prevention: 
Turn/reposition approximately every 2 hours and offload heels. Manage incontinence Total Care Sport: Use only flat sheet and one incontinence pad. DISCHARGE MEDICATIONS: 
 See Medication Reconciliation Form NOTIFY YOUR PHYSICIAN FOR ANY OF THE FOLLOWING:  
Fever over 101 degrees for 24 hours. Chest pain, shortness of breath, fever, chills, nausea, vomiting, diarrhea, change in mentation, falling, weakness, bleeding. Severe pain or pain not relieved by medications. Or, any other signs or symptoms that you may have questions about. DISPOSITION: 
  Home With: 
 OT  PT  HH  RN  
  
x SNF/Inpatient Rehab/LTAC Independent/assisted living Hospice Other:  
 
 
PATIENT CONDITION AT DISCHARGE:  
 
Functional status  
x Poor Deconditioned Independent Cognition Lucid   
x Forgetful Dementia Catheters/lines (plus indication) x Rizo   
x Midline  
x PEG None Code status Full code   
x DNR PHYSICAL EXAMINATION AT DISCHARGE: 
  
   
Constitutional: Awake,flat. Not in distress ENT:  Trach collar in place. Resp:  CTA bilaterally. No wheezing/rhonchi/rales. No accessory muscle use CV:  Regular rhythm, normal rate, no murmurs, gallops, rubs GI:  Soft, non distended, non tender. normoactive bowel sounds, no hepatosplenomegaly PEG in place. Musculoskeletal:  No edema, warm, 2+ pulses throughout Skin  multiple ulcers. Please see above Neurologic: Awake,flat Eyes:  EOMI. Anicteric sclerae, PERRL.  
   
 
 
 
CHRONIC MEDICAL DIAGNOSES: 
 Problem List as of 9/2/2017  Never Reviewed Codes Class Noted - Resolved RESOLVED: UTI (urinary tract infection) due to urinary indwelling catheter (HCC) ICD-10-CM: T83.511A, N39.0 ICD-9-CM: 996.64, 599.0  8/22/2017 - 9/2/2017 RESOLVED: Sepsis affecting skin ICD-10-CM: L02.91 
ICD-9-CM: 682.9  8/22/2017 - 9/2/2017 Signed: Yony Cho MD 
9/2/2017 
7:58 AM 
 
  
 
Discharge Orders None Zinio Announcement We are excited to announce that we are making your provider's discharge notes available to you in Zinio. You will see these notes when they are completed and signed by the physician that discharged you from your recent hospital stay. If you have any questions or concerns about any information you see in Zinio, please call the Health Information Department where you were seen or reach out to your Primary Care Provider for more information about your plan of care. Introducing [facility]! Joyce Matthews introduces Zinio patient portal. Now you can access parts of your medical record, email your doctor's office, and request medication refills online. 1. In your internet browser, go to [email or web address]. [email or web address] 2. Click on the First Time User? Click Here link in the Sign In box. You will see the New Member Sign Up page. 3. Enter your Zinio Access Code exactly as it appears below. You will not need to use this code after youve completed the sign-up process. If you do not sign up before the expiration date, you must request a new code. · Zinio Access Code: [email or web address] Expires: 11/25/2017  3:09 PM 
 
4. Enter the last four digits of your Social Security Number (xxxx) and Date of Birth (mm/dd/yyyy) as indicated and click Submit. You will be taken to the next sign-up page. 5. Create a Zinio ID. This will be your Zinio login ID and cannot be changed, so think of one that is secure and easy to remember. 6. Create a Degreed password. You can change your password at any time. 7. Enter your Password Reset Question and Answer. This can be used at a later time if you forget your password. 8. Enter your e-mail address. You will receive e-mail notification when new information is available in [address] 9. Click Sign Up. You can now view and download portions of your medical record. 10. Click the Download Summary menu link to download a portable copy of your medical information. If you have questions, please visit the Frequently Asked Questions section of the [employer] website. Remember, Degreed is NOT to be used for urgent needs. For medical emergencies, dial 911. Now available from your iPhone and Android! General Information Please provide this summary of care documentation to your next provider. Patient Signature:  ____________________________________________________________ Date:  ____________________________________________________________  
  
Fayrene RUSTort Provider Signature:  ____________________________________________________________ Date:  ____________________________________________________________

## 2017-08-22 NOTE — ED NOTES
Patient resting comfortably in stretcher. Patient responding to pain. IV fluids and Levophed infusing. Patient in no acute distress upon transfer to the ICU.

## 2017-08-22 NOTE — ED NOTES
Pt arrives by EMS from [place] at the [facility] for respiratory distress. Sats low 80s on humidified O2 via trach. EMS able to get sats to 99% with BVM. Afib. Hypotensive 80s/60s  No purposeful movement. Withdraws to pain.

## 2017-08-22 NOTE — ED NOTES
TRANSFER - OUT REPORT:    Verbal report given to SUNITHA Campa(name) on Judie Gomez  being transferred to ICU(unit) for routine progression of care       Report consisted of patients Situation, Background, Assessment and   Recommendations(SBAR). Information from the following report(s) SBAR, ED Summary, Intake/Output, MAR and Recent Results was reviewed with the receiving nurse. Lines:   Peripheral IV 08/22/17 Left Arm (Active)   Site Assessment Clean, dry, & intact 8/22/2017 10:16 AM   Phlebitis Assessment 0 8/22/2017 10:16 AM   Infiltration Assessment 0 8/22/2017 10:16 AM   Dressing Status Clean, dry, & intact 8/22/2017 10:16 AM   Hub Color/Line Status Pink 8/22/2017 10:16 AM       Peripheral IV 40/05/96 Right Basilic (Active)   Site Assessment Clean, dry, & intact 8/22/2017 11:27 AM   Phlebitis Assessment 0 8/22/2017 11:27 AM   Infiltration Assessment 0 8/22/2017 11:27 AM   Dressing Status Clean, dry, & intact 8/22/2017 11:27 AM   Hub Color/Line Status Pink 8/22/2017 11:27 AM        Opportunity for questions and clarification was provided.       Patient transported with:   Registered Nurse

## 2017-08-22 NOTE — ED NOTES
Patient's BP 77/59. Dr. Bishop Rayomnd aware. Verbal order received for 1g Calcium and 1L NS.     13:20 - Hospitalist paged for ICU bed request. Verbal order received for Levophed. 13:43 - Interventional Radiology at bedside for central line and kimberly placement. Verbal consent received by patient's wife; verbalizes understanding. Denies any questions or concerns at this time.

## 2017-08-22 NOTE — ED PROVIDER NOTES
HPI Comments: 80 y.o. male with past medical history significant for CAD s/p CABG, acute tubular necrosis, PEA arrest, anemia, sacral decubitus, and s/p tracheotomy and PEG tube who presents from [facility] at the [facility] with chief complaint of respiratory distress. EMS report that they responded for respiratory distress and upon their arrival the pt's O2 sats were in the low 80's on humidified O2 via trach. After suctioning the pt's trach, EMS state they were able to raise the pt's O2 sats to 99% with BVM. Initial BP was in the 67'Z systolic. At present, 926 systolic. Pt is noted to be a full code. Have also spoke with staff at the pt's nursing home who notes that his temperature spiked last night, being febrile to 103 F. There are no other acute medical concerns at this time. Social hx: Unknown     Full history, physical exam, and ROS unable to be obtained due to: Unresponsive pt. Note written by Shakir Jerome, as dictated by Shelby Kowalski MD 10:00 AM      The history is provided by the patient. No  was used. No past medical history on file. No past surgical history on file. No family history on file. Social History     Social History    Marital status:      Spouse name: N/A    Number of children: N/A    Years of education: N/A     Occupational History    Not on file. Social History Main Topics    Smoking status: Not on file    Smokeless tobacco: Not on file    Alcohol use Not on file    Drug use: Not on file    Sexual activity: Not on file     Other Topics Concern    Not on file     Social History Narrative         ALLERGIES: Review of patient's allergies indicates no known allergies.     Review of Systems   Unable to perform ROS: Patient unresponsive       Vitals:    08/22/17 0952 08/22/17 1004   BP: (!) 129/109 93/53   Pulse: 62 62   Resp: 16 14   Temp:  98.8 °F (37.1 °C)   SpO2: 100% 100%            Physical Exam   Constitutional: He appears well-developed and well-nourished. No distress. HENT:   Head: Normocephalic. Mouth/Throat: Mucous membranes are dry. Eyes: Pupils are equal, round, and reactive to light. Neck: Normal range of motion. Tracheostomy and on a ventilator. Cardiovascular: Normal rate and regular rhythm. Exam reveals distant heart sounds. No murmur heard. Pulmonary/Chest: Effort normal. No respiratory distress. He has rhonchi (bilateral bases). Abdominal: Soft. There is no tenderness. PEG tube in place   Genitourinary:   Genitourinary Comments: Chronic indwelling gutierrez with very cloudy urine. Musculoskeletal: Normal range of motion. He exhibits no edema. Neurological:   Unresponsive with no purposeful movement. Withdraws to pain. Skin: Skin is warm and dry. Large 6 cm sacral decubitus with foul odor. Psychiatric:   Unable to assess   Nursing note and vitals reviewed. Note written by Shakir Gallego, as dictated by Abraham Mcaias MD 10:03 AM      MDM  Number of Diagnoses or Management Options  Acute hyperkalemia:   Acute renal failure, unspecified acute renal failure type [facility]):   Pressure ulcer, unspecified location, unspecified ulcer stage:   Sepsis, due to unspecified organism [facility]):   Urinary tract infection without hematuria, site unspecified:      Amount and/or Complexity of Data Reviewed  Clinical lab tests: ordered and reviewed  Review and summarize past medical records: yes  Discuss the patient with other providers: yes  Independent visualization of images, tracings, or specimens: yes    Risk of Complications, Morbidity, and/or Mortality  Presenting problems: high  Diagnostic procedures: high  Management options: high    Critical Care  Total time providing critical care: 30-74 minutes    ED Course       Procedures        ED EKG interpretation:  Rhythm: Junctional rhythm. Rate (approx.): 61; RBBB.    Note written by Shakir Gallego, as dictated by Abraham Macias MD 10:22 AM      CONSULT NOTE:  11:42 AM Doe Iverson MD spoke with Dr. Jessica Gtz, Consult for Hospitalist.  Discussed available diagnostic tests and clinical findings. He is in agreement with care plans as outlined. Dr. Jessica Gtz will see and admit the pt.

## 2017-08-22 NOTE — PROGRESS NOTES
Patient seen again during [facility] at bedside;she said she is not sure if patient wants long term dialysis;she also said she was told he had prostate cancer among other problems;i told the patient that she needs to make the decision regarding long term dialysis,she is aware he will be a poor candidate for that.

## 2017-08-22 NOTE — H&P
[address]   [address], [address]   HISTORY AND PHYSICAL       Name:  Luiz Gomez   MR#:  [id number]   :  11/10/1933   Account #:  [de-identified]        Date of Adm:  2017       CHIEF COMPLAINT: Fever. HISTORY OF PRESENT ILLNESS: The patient is an 80-year-old   patient who originally was a resident of [place], [place]. He   was admitted in [facility] on 2017 due to   hypotension, hyperventilation and confusion. On that occasion the   patient was a resident of the [facility] with a past   medical history of hypertension, diabetes, hyperlipidemia and coronary   artery disease. While in [facility], the patient   deteriorated and he was transferred to ICU where he was intubated,   sedated for prolonged period of time. Since the patient was unable to   be extubated while in the care in the ICU, then tracheostomy and PEG   was done and the patient was transferred to a long term care facility   that was in [place]. On that occasion the patient was diagnosed   with a hypoxemic respiratory failure due to metabolic acidosis with high   anion gap. Also C. Difficile colitis, coronary artery disease and severe   deconditioning. With this element, the patient was transferred to long   term care facility and the patient has been in the [address] [place] [address]   here. After calling the nurse who was taking care of the patient we   heard he started yesterday night having high fever associated with   shortness of breath. This morning the patient continued with shortness   of breath, for which we will transfer the patient to our ER. While in our   ER, the patient was connected to the ventilator through the   tracheostomy. Social part is that this patient is a FULL CODE on the hospice care at   the [facility]. ALLERGIES: THERE ARE NO ALLERGIES RECORDED IN THE   [facility] MED RECORDS.  THERE IS NO   KNOWN ALLERGY. PAST MEDICAL HISTORY: Of the patient again is:    1. Atrial fibrillation. 2. Hypotension. 3. Altered mental status. 4. History of C. Difficile colitis. 5. Coronary artery disease. 6. Hypertension. 7. Hyperlipidemia. 8. CABG. 9. Decubitus ulcer. 10. Status post cardiac arrest.    11. History of prolonged urosepsis. 12. Chronic obstructive pulmonary disease. MEDICATIONS: that he is taking is:    1. Tylenol as needed, 650.   2. DuoNeb as needed for shortness of breath. 3. Aspirin 81 mg daily. 4. Lipitor 40 mg daily. 5. Atrovent twice a day. 6. Nexium 40 mg daily. 7. Sliding scale insulin. 8. Morphine sulfate as needed for pain. 9. Phenergan 25 as needed. SURGICAL/PROCEDURE HISTORY:    1. Tracheostomy. 2. PEG done in June of this year. TOXIC HABITS: Unable to obtain at this time. FAMILY HISTORY: Noncontributory. SOCIAL HISTORY: Again, this patient was a resident of [address] [place] [address] in [facility], moved to [address] [place] [address] at the [facility] long term care facility under hospice care, making   comfort measures, but he wanted CPR to be done. REVIEW OF SYSTEMS: Not done due to the altered mental status   and changes. PHYSICAL EXAMINATION   VITAL SIGNS: Of the patient this time blood pressure is 105/57 (on pressores), pulse   is 86, respirations are 20, the temperature is 97.6. GENERAL APPEARANCE: This is an 80year-old patient, chronically   and acutely ill with pressors as well as ventilator at this time. Patient is   unable to follow commands or to respond to any greeting. HEENT: There is no deformity. He has a skin lesion in the center of his   skull. There is no secretions or foul smelling  , pale conjunctiva. Dry   oral mucosa. NECK: Supple with a tracheostomy present, there is some pus around   the trachea with some erythema present. LUNGS: Are clear to auscultation. HEART: Is a regular rhythm, S1, S2 present. ABDOMEN: Scaphoid in shape, with PEG placement. Again in the area   of insertion there is very tiny pus, no smell coming out. No tenderness. EXTREMITIES: Limbs are symmetric, hypotrophic. There is a 4 x 4   decubital ulcer with foul smell coming out of it. LABORATORY DATA: The initial lab, the WBC count is 20,000. H and   H is 9.5 and 33 with platelet count of 826,703. Sodium is 160,   potassium is 6.8, chloride is 128. Glucose is 192. BUN is 167,   creatinine 4.4. His creatinine back in [facility] was 4.6, that is   almost correlated with the level that the patient is having here at this   time. NEUROLOGICAL EXAM: The patient is awake, did not follow   commands. ASSESSMENT AND PLAN:    1. Sepsis. Secondary to infected decubitus ulcer as well as infected   urine. 2. Acute and chronic renal failure. 3.  Hx of C. Difficile Colitis  4. Hyperkalemic. 5. Dehydration. 6. Respiratory failure. 7. History of atrial fibrillation. 8. Coronary artery disease status post coronary artery bypass graft    9. Decubital ulcer, at least 4 x 4.   10. Status post cardiac arrest.   11. Status post tracheostomy and percutaneous endoscopic   gastrostomy tube. PLAN: At this time will be for the sepsis secondary to decubitus ulcer   or the urine will be broad spectrum antibiotic vancomycin and Zosyn. Continue following the culture. For his acute and chronic renal failure, aggressive hydration will be   vital for this patient in order to determine where is the baseline renal   function. Nephrology will be consulted for further care and   management. Hyperkalemic, hypernatremic, dehydration - again the water deficit for   this patient will need to be replaced with normal saline as well as   and hydration. Respiratory failure - patient has been already connected to his cannula   through the tracheostomy. His last ABG the pO2 was 296 with an FIO2   of 100%.   Further management will be as per pulmonary doctor. Coronary artery disease - status post CABG. Symptomatic treatment at   this time in this patient. Decubital ulcer - he might need debridement or use local care with the   wound care. DVT and GI prophylaxis will be given to the patient.          MD ERIS Tabor / MICHELINE   D:  08/22/2017   15:29   T:  08/22/2017   16:42   Job #:  [id number]

## 2017-08-22 NOTE — CONSULTS
[facility] [address]    [facility]       Name:  Luis Blanc   MR#:  [id number]   :  11/10/1933   Account #:  [de-identified]    Date of Consultation:  2016   Date of Adm:  2017       REASON FOR CONSULTATION: Seen for renal failure and   hyperkalemia. SOURCE OF INFORMATION: All the history was taken from the chart   and the papers he came with him. HISTORY OF PRESENT ILLNESS: The patient was brought by EMS   from [facility] at the [place] because of being hypoxic, hypotensive,   and then admitted here to the hospital and had to be intubated. He   ended up being intubated, found to be in renal failure, and suspicion of   infection. The emergency room already called [facility]   [place] office, and they spoke to them and the patient was alert   and oriented to say he wanted CPR, and he is a FULL CODE. Upon   admission, he was hypotensive. He is receiving a third L of normal   saline. BUN was 181, creatinine is 4.7, potassium 8.6, with wide QRS. He was treated acutely for the hyperkalemia. A Rizo was changed,   and there is not much urine,   prior to admission Rizo catheter. Supposedly had decubitus ulcers. I tried in person calling the spouse at   the numbers available, and left a message for her, and then called the   PCP also to get in touch more with the family, but from all the   information we have now, the patient is FULL CODE and he needs   dialysis. PAST MEDICAL HISTORY    1. Not sure what his baseline creatinine is. 2. Decubitus ulcers. 3. He is a nursing home resident. 4. The patient seen by hospice, but HE IS FULL CODE AS PER THE   RECORDS THAT WE HAVE AND AS PER NOTES THAT WE HAVE. IT IS OBVIOUS HE HAD TRACH ALSO. FAMILY HISTORY: As per chart. SOCIAL HISTORY: As per chart. MEDICATIONS PRIOR TO ADMIT INCLUDE   1. Phenergan. 2. Aspirin. 3. Lipitor. 4. Nexium.      MEDICATION AS INPATIENT NOW: He received   1. Calcium gluconate. 2. Insulin. 3. D50.   4. IV bicarbonate. REVIEW OF SYSTEMS: Look at the HPI. The rest is negative. PHYSICAL EXAMINATION   GENERAL: The patient is intubated. VITAL SIGNS: Blood pressure 134/97, pulse is 95, saturating 96%. NECK: JVD difficult to assess. ABDOMEN: Cachectic-looking. EXTREMITIES: No edema. LUNGS: Decreased breath sounds anterior and some rales. IMAGING: X-ray on him is no acute process. LABORATORY DATA: Sodium 155, potassium is 8.6, bicarbonate 26,   , creatinine of 4.7, anion gap is 6, calcium is 8.9, total bilirubin   0.5, albumin is 1.7. Lactic 2.9. ABG 7.39, 42 and 296. There is no urine   checked. DIAGNOSTIC DATA: EKG is reviewed. IMPRESSION   1. Acute kidney injury, very likely related to volume depletion versus   septic acute tubular necrosis. I do not have any baseline, and not   much urine output despite having a Rizo catheter. 2. Severe hyperkalemia, with QRS changes, very likely related to his   renal failure. Status post medical therapy for it, he needs urgent   dialysis. 3. Hypotension, improved. 4. Ventilatory-dependent respiratory failure. 5. History of decubitus ulcers. 6. Hypernatremia. 7. Leukocytosis, very likely septic, needs antibiotic coverage. RECOMMENDATIONS   1. The patient needs urgent dialysis. We will also recheck his   potassium now. 2. Called IR for [facility]. 3. We tried to reach his family and left a message at the phone   number, and then we tried to reach his PCP and left my phone number   with them. But, from the records saying that the patient wants to be   FULL CODE and he wants CPR, as per discussion with the emergency   room physician, and looking at the note from the ER. So, we will do   urgent dialysis for him now to improve his potassium, and then   continue discussion either with him if he gets better, or with his family.    4. Very critical.   5. Surprisingly, is not acidotic. 6. Check CK. 7. Recheck labs. 8. We will follow. MD Bethany Randall   D:  08/22/2017   12:51   T:  08/22/2017   13:20   Job #:  [id number]

## 2017-08-22 NOTE — CONSULTS
Seen and examined  Thanks dr Bhaskar Cosme for the consult:  A/P:FREDERICK,? Baseline likely septic ATN/hypotension  with chronic gutierrez,very high BUN        Severe hyperkalemia,s.p medical trt needing urgent dialysis        Septic shock        VDRF  He needs urgent dialysis;he is at risk for disequilibrium synd but he needs dialysis for his severe hyperkalemia   Unable to reach family(we left voice message with wife;also called his PCP),as per records he is FULL code   Check urine testing,renal U/S. ...  IR called for Cristhian newton  Critical  Discussed with ER team,dr Bhaskar Cosme

## 2017-08-22 NOTE — IP AVS SNAPSHOT
[address] [facility] [address] [place] [address] 
[phone] Patient: Wilber Contreras MRN: [id number] FTQ:[id number] Current Discharge Medication List  
  
CONTINUE these medications which have NOT CHANGED Dose & Instructions Dispensing Information Comments Morning Noon Evening Bedtime  
 acetaminophen 650 mg suppository Commonly known as:  TYLENOL Your last dose was: Your next dose is:    
   
   
 Dose:  650 mg Insert 650 mg into rectum every four (4) hours as needed for Fever. Refills:  0  
     
   
   
   
  
 albuterol-ipratropium 2.5 mg-0.5 mg/3 ml Nebu Commonly known as:  Erwin Ledezma Your last dose was: Your next dose is:    
   
   
 Dose:  3 mL  
3 mL by Nebulization route every six (6) hours. Indications: CHRONIC OBSTRUCTIVE PULMONARY DISEASE WITH BRONCHOSPASMS Refills:  0  
     
   
   
   
  
 aspirin 81 mg chewable tablet Your last dose was: Your next dose is:    
   
   
 Dose:  81 mg  
81 mg by PEG Tube route daily. Refills:  0  
     
   
   
   
  
 ATIVAN 0.5 mg tablet Generic drug:  LORazepam  
   
Your last dose was: Your next dose is:    
   
   
 Dose:  0.5 mg  
0.5 mg by PEG Tube route every four (4) hours as needed for Anxiety. Refills:  0  
     
   
   
   
  
 atorvastatin 40 mg tablet Commonly known as:  LIPITOR Your last dose was: Your next dose is:    
   
   
 Dose:  40 mg  
40 mg by PEG Tube route nightly. Indications: hyperlipidemia Refills:  0  
     
   
   
   
  
 atropine 1 % ophthalmic solution Your last dose was: Your next dose is:    
   
   
 Dose:  2 Drop  
2 Drops by SubLINGual route every one (1) hour as needed (secreations). Refills:  0 BISCOLAX 10 mg suppository Generic drug:  bisacodyl Your last dose was:     
   
Your next dose is:    
   
   
 Dose:  10 mg  
 Insert 10 mg into rectum daily as needed. Refills:  0  
     
   
   
   
  
 chlorhexidine 0.12 % solution Commonly known as:  PERIDEX Your last dose was: Your next dose is:    
   
   
 Dose:  15 mL  
15 mL by Swish and Spit route two (2) times daily as needed (oral care). Refills:  0  
     
   
   
   
  
 insulin regular 100 unit/mL injection Commonly known as:  Alvena Juaquin, HUMULIN R Your last dose was: Your next dose is:    
   
   
 by SubCUTAneous route. 151-200 = 2 units 201-250 = 4 units 251-300 = 6 units 301-350 = 9 units 351-400 = 10 units Refills:  0 NexIUM Packet 40 mg granules for oral suspension Generic drug:  esomeprazole Your last dose was: Your next dose is:    
   
   
 Dose:  40 mg  
40 mg by PEG Tube route daily. Indications: gastroesophageal reflux disease Refills:  0 PHENERGAN 25 mg suppository Generic drug:  promethazine Your last dose was: Your next dose is:    
   
   
 Dose:  25 mg Insert 25 mg into rectum every four (4) hours as needed for Nausea. Refills:  0  
     
   
   
   
  
 ROXANOL CONCENTRATE PO Your last dose was: Your next dose is:    
   
   
 Dose:  5 mg  
5 mg by SubLINGual route every one (1) hour as needed for Pain (pain or sob). Refills:  0 ASK your doctor about these medications Dose & Instructions Dispensing Information Comments Morning Noon Evening Bedtime GLUCERNA 1.5 RJ Liqd Generic drug:  nut.tx.gluc.intol,lac-free,soy Your last dose was: Your next dose is:    
   
   
 by PEG Tube route. 60cc/h Refills:  0

## 2017-08-23 ENCOUNTER — APPOINTMENT (OUTPATIENT)
Dept: GENERAL RADIOLOGY | Age: 82
DRG: 698 | End: 2017-08-23
Attending: INTERNAL MEDICINE
Payer: MEDICARE

## 2017-08-23 LAB
ANION GAP SERPL CALC-SCNC: 10 MMOL/L (ref 5–15)
APTT PPP: 36.3 SEC (ref 22.1–32.5)
BASOPHILS # BLD: 0 K/UL (ref 0–0.1)
BASOPHILS NFR BLD: 0 % (ref 0–1)
BUN SERPL-MCNC: 68 MG/DL (ref 6–20)
BUN/CREAT SERPL: 30 (ref 12–20)
CALCIUM SERPL-MCNC: 7.7 MG/DL (ref 8.5–10.1)
CHLORIDE SERPL-SCNC: 107 MMOL/L (ref 97–108)
CO2 SERPL-SCNC: 26 MMOL/L (ref 21–32)
CORTIS SERPL-MCNC: 19.7 UG/DL
CREAT SERPL-MCNC: 2.29 MG/DL (ref 0.7–1.3)
DIFFERENTIAL METHOD BLD: ABNORMAL
EOSINOPHIL # BLD: 1.3 K/UL (ref 0–0.4)
EOSINOPHIL NFR BLD: 6 % (ref 0–7)
ERYTHROCYTE [DISTWIDTH] IN BLOOD BY AUTOMATED COUNT: 17 % (ref 11.5–14.5)
GLUCOSE SERPL-MCNC: 105 MG/DL (ref 65–100)
HCT VFR BLD AUTO: 25.4 % (ref 36.6–50.3)
HGB BLD-MCNC: 7.7 G/DL (ref 12.1–17)
INR PPP: 1.3 (ref 0.9–1.1)
LACTATE SERPL-SCNC: 1 MMOL/L (ref 0.4–2)
LYMPHOCYTES # BLD: 2.8 K/UL (ref 0.8–3.5)
LYMPHOCYTES NFR BLD: 13 % (ref 12–49)
MCH RBC QN AUTO: 27.1 PG (ref 26–34)
MCHC RBC AUTO-ENTMCNC: 30.3 G/DL (ref 30–36.5)
MCV RBC AUTO: 89.4 FL (ref 80–99)
MONOCYTES # BLD: 0.4 K/UL (ref 0–1)
MONOCYTES NFR BLD: 2 % (ref 5–13)
NEUTS BAND NFR BLD MANUAL: 2 % (ref 0–6)
NEUTS SEG # BLD: 16.8 K/UL (ref 1.8–8)
NEUTS SEG NFR BLD: 77 % (ref 32–75)
PLATELET # BLD AUTO: 293 K/UL (ref 150–400)
PLATELET COMMENTS,PCOM: ABNORMAL
POTASSIUM SERPL-SCNC: 4.3 MMOL/L (ref 3.5–5.1)
PROTHROMBIN TIME: 13.8 SEC (ref 9–11.1)
RBC # BLD AUTO: 2.84 M/UL (ref 4.1–5.7)
RBC MORPH BLD: ABNORMAL
RBC MORPH BLD: ABNORMAL
SODIUM SERPL-SCNC: 143 MMOL/L (ref 136–145)
THERAPEUTIC RANGE,PTTT: ABNORMAL SECS (ref 58–77)
WBC # BLD AUTO: 21.3 K/UL (ref 4.1–11.1)

## 2017-08-23 PROCEDURE — 74011250636 HC RX REV CODE- 250/636: Performed by: INTERNAL MEDICINE

## 2017-08-23 PROCEDURE — 85730 THROMBOPLASTIN TIME PARTIAL: CPT | Performed by: INTERNAL MEDICINE

## 2017-08-23 PROCEDURE — 74011000258 HC RX REV CODE- 258: Performed by: INTERNAL MEDICINE

## 2017-08-23 PROCEDURE — 83605 ASSAY OF LACTIC ACID: CPT | Performed by: INTERNAL MEDICINE

## 2017-08-23 PROCEDURE — P9047 ALBUMIN (HUMAN), 25%, 50ML: HCPCS | Performed by: INTERNAL MEDICINE

## 2017-08-23 PROCEDURE — 76450000000

## 2017-08-23 PROCEDURE — 94640 AIRWAY INHALATION TREATMENT: CPT

## 2017-08-23 PROCEDURE — 80048 BASIC METABOLIC PNL TOTAL CA: CPT | Performed by: INTERNAL MEDICINE

## 2017-08-23 PROCEDURE — 65610000006 HC RM INTENSIVE CARE

## 2017-08-23 PROCEDURE — 77010033678 HC OXYGEN DAILY

## 2017-08-23 PROCEDURE — 36415 COLL VENOUS BLD VENIPUNCTURE: CPT | Performed by: INTERNAL MEDICINE

## 2017-08-23 PROCEDURE — 85025 COMPLETE CBC W/AUTO DIFF WBC: CPT | Performed by: INTERNAL MEDICINE

## 2017-08-23 PROCEDURE — 71010 XR CHEST PORT: CPT

## 2017-08-23 PROCEDURE — 74011250637 HC RX REV CODE- 250/637: Performed by: INTERNAL MEDICINE

## 2017-08-23 PROCEDURE — 77030018846 HC SOL IRR STRL H20 ICUM -A

## 2017-08-23 PROCEDURE — 84153 ASSAY OF PSA TOTAL: CPT | Performed by: INTERNAL MEDICINE

## 2017-08-23 PROCEDURE — 85610 PROTHROMBIN TIME: CPT | Performed by: INTERNAL MEDICINE

## 2017-08-23 PROCEDURE — 82533 TOTAL CORTISOL: CPT | Performed by: INTERNAL MEDICINE

## 2017-08-23 PROCEDURE — 77030018861

## 2017-08-23 PROCEDURE — 94003 VENT MGMT INPAT SUBQ DAY: CPT

## 2017-08-23 PROCEDURE — 74011000250 HC RX REV CODE- 250: Performed by: INTERNAL MEDICINE

## 2017-08-23 PROCEDURE — 77030018836 HC SOL IRR NACL ICUM -A

## 2017-08-23 PROCEDURE — 36591 DRAW BLOOD OFF VENOUS DEVICE: CPT

## 2017-08-23 RX ORDER — FENTANYL CITRATE 50 UG/ML
25 INJECTION, SOLUTION INTRAMUSCULAR; INTRAVENOUS
Status: DISCONTINUED | OUTPATIENT
Start: 2017-08-23 | End: 2017-09-02 | Stop reason: HOSPADM

## 2017-08-23 RX ORDER — ALBUMIN HUMAN 250 G/1000ML
12.5 SOLUTION INTRAVENOUS EVERY 6 HOURS
Status: COMPLETED | OUTPATIENT
Start: 2017-08-23 | End: 2017-08-24

## 2017-08-23 RX ORDER — LEVOFLOXACIN 5 MG/ML
750 INJECTION, SOLUTION INTRAVENOUS
Status: DISCONTINUED | OUTPATIENT
Start: 2017-08-23 | End: 2017-08-24 | Stop reason: DRUGHIGH

## 2017-08-23 RX ADMIN — FENTANYL CITRATE 25 MCG: 50 INJECTION, SOLUTION INTRAMUSCULAR; INTRAVENOUS at 13:37

## 2017-08-23 RX ADMIN — IPRATROPIUM BROMIDE AND ALBUTEROL SULFATE 3 ML: .5; 3 SOLUTION RESPIRATORY (INHALATION) at 07:57

## 2017-08-23 RX ADMIN — ALBUMIN (HUMAN) 12.5 G: 0.25 INJECTION, SOLUTION INTRAVENOUS at 11:22

## 2017-08-23 RX ADMIN — IPRATROPIUM BROMIDE AND ALBUTEROL SULFATE 3 ML: .5; 3 SOLUTION RESPIRATORY (INHALATION) at 20:37

## 2017-08-23 RX ADMIN — MEROPENEM 500 MG: 500 INJECTION, POWDER, FOR SOLUTION INTRAVENOUS at 11:22

## 2017-08-23 RX ADMIN — SODIUM CHLORIDE 100 ML/HR: 900 INJECTION, SOLUTION INTRAVENOUS at 15:36

## 2017-08-23 RX ADMIN — PANTOPRAZOLE SODIUM 40 MG: 40 TABLET, DELAYED RELEASE ORAL at 06:53

## 2017-08-23 RX ADMIN — LEVOFLOXACIN 750 MG: 5 INJECTION, SOLUTION INTRAVENOUS at 11:22

## 2017-08-23 RX ADMIN — Medication 10 ML: at 06:00

## 2017-08-23 RX ADMIN — ASPIRIN 81 MG 81 MG: 81 TABLET ORAL at 08:14

## 2017-08-23 RX ADMIN — IPRATROPIUM BROMIDE AND ALBUTEROL SULFATE 3 ML: .5; 3 SOLUTION RESPIRATORY (INHALATION) at 02:41

## 2017-08-23 RX ADMIN — ALBUMIN (HUMAN) 12.5 G: 0.25 INJECTION, SOLUTION INTRAVENOUS at 17:23

## 2017-08-23 RX ADMIN — SODIUM CHLORIDE 100 ML/HR: 900 INJECTION, SOLUTION INTRAVENOUS at 05:15

## 2017-08-23 RX ADMIN — IPRATROPIUM BROMIDE AND ALBUTEROL SULFATE 3 ML: .5; 3 SOLUTION RESPIRATORY (INHALATION) at 13:45

## 2017-08-23 RX ADMIN — COLLAGENASE SANTYL: 250 OINTMENT TOPICAL at 08:14

## 2017-08-23 RX ADMIN — Medication 10 ML: at 22:23

## 2017-08-23 RX ADMIN — MEROPENEM 500 MG: 500 INJECTION, POWDER, FOR SOLUTION INTRAVENOUS at 22:22

## 2017-08-23 RX ADMIN — HEPARIN SODIUM 5000 UNITS: 5000 INJECTION, SOLUTION INTRAVENOUS; SUBCUTANEOUS at 22:23

## 2017-08-23 RX ADMIN — Medication 10 ML: at 13:35

## 2017-08-23 NOTE — PROGRESS NOTES
2017      To whom it may concern:      Avery Tellez ( [birth year]) is currently a patient at [facility] in [facility], [facility]. He is critically ill and is not excepted to make a recovery to the point where he will regain the capacity for managing his general or financial affairs. With any further questions, feel free to call my office.        Sincerely,          Enriqueta Lopez MD  Palliative Medicine  [address] [place] [address]  [phone]

## 2017-08-23 NOTE — PROGRESS NOTES
Subjective  No new complaints   Bleeding from central line    Temp:  [97.6 °F (36.4 °C)-100.4 °F (38 °C)]   Pulse (Heart Rate):  []   BP: ()/()   Resp Rate:  [8-29]   O2 Sat (%):  [96 %-100 %]   Weight:  [134 lb 11.2 oz (61.1 kg)-136 lb 11 oz (62 kg)]      08/21 1901 - 08/23 0700  In: 1587.1 [I.V.:1587.1]  Out: 195 [Urine:195]      Objective  Neck- There is bleeding from around the kimberly catheter  Sacrum- continues to have fibrinous exudate. Loose material was debrided sharply at bedside. Active Problems:    UTI (urinary tract infection) due to urinary indwelling catheter ([address] (8/22/2017)      Sepsis affecting skin (8/22/2017)          Assessment & Plan  Sacral Decubitus- continue santyl and gubaljinder- wound care consult   Neck- the area was cleaned with chloraprep. 3-0 silk pursestring suture was then placed. Bleeding stopped.

## 2017-08-23 NOTE — PROGRESS NOTES
Hospitalist Progress Note  Raymon Samayoa MD  Office: [phone]        Date of Service:  2017  NAME:  Tiffanie Barnett  :  11/10/1933  MRN:  [id number]      Admission Summary:   The patient is an 80-year-old patient who originally was a resident of [place], [place]. He   was admitted in [facility] on 2017 due to hypotension, hyperventilation and confusion. Since the patient was unable to be extubated while in the care in the ICU, then tracheostomy and PEG was done and the patient was transferred to a long term care facility   that was in [place]. his morning the patient continued with shortness of breath, for which we will transfer the patient to our ER. While in our ER, the patient was connected to the ventilator through the tracheostomy. Interval history / Subjective:   Remains on vent      Assessment & Plan:     1. Sepsis with septic shock from indwelling gutierrez cath secondary to UTI c/s GNR > 100k  On merrem fluid resuscitation per protocol on levophed/ albimun MGMT per PCCM     2.  acute and chronic renal failure,due to hypotension from sepsis,  aggressive hydration  Nephrology consulted      3. Hyperkalemic, hypernatremic, dehydration - from renal failure,  improved      4. Acute hypoxic Respiratory failure - patient has been already connected to his cannula   through the tracheostomy. His last ABG the pO2 was 296 with an FIO2   of 100%. Further management will be as per PCCM     5. Coronary artery disease - status post CABG. Symptomatic treatment at   this time in this patient.      6. Decubital ulcer - no debridement planned per surgery     7.  Anemia - possibly from chronic dz monitor H and H      Code status: Full  DVT prophylaxis: [address] discussed with: Patient/Family and Nurse  Disposition: TBD   Poor prognosis     Hospital Problems  Never Reviewed          Codes Class Noted POA UTI (urinary tract infection) due to urinary indwelling catheter [facility]) ICD-10-CM: T83.511A, N39.0  ICD-9-CM: 996.64, 599.0  8/22/2017 Unknown        Sepsis affecting skin ICD-10-CM: L02.91  ICD-9-CM: 682.9  8/22/2017 Unknown                Review of Systems:   NOT DONE DUE TO PT FACTOR       Vital Signs:    Last 24hrs VS reviewed since prior progress note. Most recent are:  Visit Vitals    /57    Pulse 75    Temp 100.4 °F (38 °C)    Resp 14    Wt 61.1 kg (134 lb 11.2 oz)    SpO2 100%         Intake/Output Summary (Last 24 hours) at 08/23/17 1037  Last data filed at 08/23/17 3193   Gross per 24 hour   Intake          1587.12 ml   Output              195 ml   Net          1392.12 ml        Physical Examination:             Constitutional:  Trache to vent    ENT:  trache to vent   Resp:  CTA bilaterally. CV:  Regular rhythm, normal rate    GI:  Soft, non distended,     Musculoskeletal:  No edema, warm, 2+ pulses throughout    Neurologic:  on vent             Data Review:    I personally reviewed  Image and LABS      Labs:     Recent Labs      08/23/17   0421 08/22/17   1000   WBC  21.3*  20.2*   HGB  7.7*  9.5*   HCT  25.4*  33.1*   PLT  293  413*     Recent Labs      08/23/17   0421  08/22/17   2021  08/22/17   1309  08/22/17   1244   NA  143  143   --   160*   K  4.3  4.1   --   6.8*   CL  107  105   --   128*   CO2  26  28   --   25   BUN  68*  59*   --   167*   CREA  2.29*  1.90*   --   4.52*   GLU  105*  135*   --   192*   CA  7.7*  7.7*   --   8.4*   MG   --    --   2.9*   --    PHOS   --    --   4.9*   --      Recent Labs      08/22/17   1000   SGOT  893*   ALT  763*   AP  333*   TBILI  0.5   TP  8.5*   ALB  1.7*   GLOB  6.8*     Recent Labs      08/23/17   0421   INR  1.3*   PTP  13.8*   APTT  36.3*      No results for input(s): FE, TIBC, PSAT, FERR in the last 72 hours. No results found for: FOL, RBCF   No results for input(s): PH, PCO2, PO2 in the last 72 hours.   Recent Labs      08/22/17 1309  08/22/17   1000   CPK  25*   --    TROIQ   --   0.08*     No results found for: CHOL, CHOLX, CHLST, CHOLV, HDL, LDL, LDLC, DLDLP, TGLX, TRIGL, TRIGP, CHHD, CHHDX  No results found for: [facility]  Lab Results   Component Value Date/Time    Color YELLOW/STRAW 08/22/2017 12:14 PM    Appearance TURBID 08/22/2017 12:14 PM    Specific gravity 1.010 08/22/2017 12:14 PM    pH (UA) 7.0 08/22/2017 12:14 PM    Protein 100 08/22/2017 12:14 PM    Glucose NEGATIVE  08/22/2017 12:14 PM    Ketone NEGATIVE  08/22/2017 12:14 PM    Bilirubin NEGATIVE  08/22/2017 12:14 PM    Urobilinogen 0.2 08/22/2017 12:14 PM    Nitrites NEGATIVE  08/22/2017 12:14 PM    Leukocyte Esterase LARGE 08/22/2017 12:14 PM    Epithelial cells FEW 08/22/2017 12:14 PM    Bacteria 4+ 08/22/2017 12:14 PM    WBC  08/22/2017 12:14 PM    RBC 20-50 08/22/2017 12:14 PM         Medications Reviewed:     Current Facility-Administered Medications   Medication Dose Route Frequency    meropenem (MERREM) 500 mg in 0.9% sodium chloride (MBP/ADV) 50 mL  0.5 g IntraVENous Q12H    levoFLOXacin (LEVAQUIN) 750 mg in D5W IVPB  750 mg IntraVENous Q48H    albumin human 25% (BUMINATE) solution 12.5 g  12.5 g IntraVENous Q6H    fentaNYL citrate (PF) injection 25 mcg  25 mcg IntraVENous Q1H PRN    sodium chloride (NS) flush 5-10 mL  5-10 mL IntraVENous Q8H    sodium chloride (NS) flush 5-10 mL  5-10 mL IntraVENous PRN    acetaminophen (TYLENOL) tablet 650 mg  650 mg Oral Q4H PRN    HYDROcodone-acetaminophen (NORCO) 5-325 mg per tablet 1 Tab  1 Tab Oral Q4H PRN    ondansetron (ZOFRAN ODT) tablet 4 mg  4 mg Oral Q4H PRN    heparin (porcine) injection 5,000 Units  5,000 Units SubCUTAneous Q8H    acetaminophen (TYLENOL) suppository 650 mg  650 mg Rectal Q4H PRN    albuterol-ipratropium (DUO-NEB) 2.5 MG-0.5 MG/3 ML  3 mL Nebulization Q6H RT    aspirin chewable tablet 81 mg  81 mg Per G Tube DAILY    NOREPINephrine (LEVOPHED) 8,000 mcg in dextrose 5% 250 mL infusion 2-16 mcg/min IntraVENous TITRATE    pantoprazole (PROTONIX) 2 mg/mL oral suspension 40 mg  40 mg PEG Tube ACB    collagenase (SANTYL) 250 unit/gram ointment   Topical DAILY    0.9% sodium chloride infusion  100 mL/hr IntraVENous CONTINUOUS     ______________________________________________________________________  EXPECTED LENGTH OF STAY: - - -  ACTUAL LENGTH OF STAY:          1                 Harinder Cabrera MD

## 2017-08-23 NOTE — PROGRESS NOTES
Reviewed chart. Consulted with nurse. Consulted with Livan Corona from [address], who has interviewed wife Anisa Ruiztrinidad [phone]. Patient admitted from [place] [facility] and under [facility] [phone]. Previously at [facility] after stay at [place] [address] and [facility]. Patient now vented and dialyzes. Per Livan Corona, wife expressed that if patient stabilizes for discharge, she wants a nursing home in [facility], but not [address] [place] [address]. Wife currently on phone. Palliative nurse practioner has provided note addressing patient's condition for wife's use in handling patient's affairs. Will check in for further assessment with wife. CRM following for completion of d/c plan.   Maria Antonia Bajwa LCSW, CCM

## 2017-08-23 NOTE — CDMP QUERY
Please clarify if this patient is being treated/managed for:    =>UTI ( POA) due to indwelling gutierrez in the setting of sepsis treated with IV zosyn, levaquin, and merrem  =>Other Explanation of clinical findings  =>Unable to Determine (no explanation of clinical findings)    The medical record reflects the following clinical findings, treatment, and risk factors:    Risk Factors: sepsis; UTI; indwelling gutierrez on admission    Clinical Indicators: 8/22 RENAL  FREDERICK,? Baseline likely septic ATN/hypotension  with chronic gutierrez,very high BUN    8/23 NEPRHO   Septic shock;gram neg rods bacteremia likely from urospesis  cachexia    Treatment: IV zosyn, levaquin, merrem     Please clarify and document your clinical opinion in the progress notes and discharge summary including the definitive and/or presumptive diagnosis, (suspected or probable), related to the above clinical findings. Please include clinical findings supporting your diagnosis.     Thank Luis Miguel Weldon [facility]  [phone]

## 2017-08-23 NOTE — PROGRESS NOTES
NUTRITION COMPLETE ASSESSMENT    RECOMMENDATIONS:   1. If consistent with pt care goals resume tube feeds via PEG:   - Suplena @ 50ml/hr + 160ml q3hr (or adjusted per renal)  **Once renal status improved/stable switch back to Glucerna - see recs below**    2. Monitor K+ and Phos   3. Daily weights     Interventions/Plan:   Food/Nutrient Delivery:          Initiate enteral nutrition    Assessment:   Reason for Assessment: [x]BPA/MST Referral (unsure wt loss, pressure ulcer)    Diet: NPO  Nutritionally Significant Medications: [x] Reviewed & Includes: albumin, levaquin, merrem, protonix, NS @ 100ml/hr, levophed    Pre-Hospitalization:  Diet at Home: npo (with PEG)    Subjective:  N/a. Wife not at bedside at time of visit. Objective:  Pt admitted for sepsis from [facility]. PMHx: CAD, DM, hyperlipidemia, ventilation w/ tracheostomy and PEG (6/2017). Sepsis with abx rx, pressors rx.  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 FREDERICK on CKD with hyperkalemia and need for emergent HD 8/22 - renal following. Of note: pt on Hospice PTA and renal recommending continuation with this since poor HD candidate. Seen by Palliative care with plans to continue with current care with decision regarding HD as needed. Spoke w/ RN who notes wife considering Hospice. Unclear if tube feeds will be restarted. If to continue with current care recommend resuming tube feeds via PEG. Pt on Glucerna 1.5 @ 65ml/hr (2340kcal, 129g protein) PTA.   - With renal status poor would recommend Suplena for now: Suplena @ 50ml/hr + 160ml q3hr (or adjusted per renal). [1200ml, 2160kcal, 54g protein, 1366mg K+, 860mg Phos, 885ml fluid + 1280ml flush = 2165ml fluid.]      - Once renal status back to baseline resume: Glucerna 1.2 @ 80ml/hr + 105ml flush q4hr. [1920ml, 2304kcal, 115g protein, 3878mg K+, 1536mg Phos, 1545ml free fluid + 630ml flush = 2175ml fluid.]    No wt hx available but only 76% IBW, with temporal and clavicular wasting.  Meets criteria for malnutrition. Wt Readings from Last 10 Encounters:   08/23/17 62 kg (136 lb 11 oz)     Meets Criteria for Chronic Malnutrition   [x] Severe Malnutrition, as evidenced by:   [x] Severe muscle wasting   [x]  Severe loss of subcutaneous fat   []  Nutritional intake of <75% of recommended intake for >1 month   [] Weight loss of  >5% in 1 month, >7.5% in 3 months, >10% in 6 months, >20% in 1 year    [] Severe edema     Will continue to follow for plan of care, restart of tube feeds, electrolytes and wt. Estimated Nutrition Needs:   Kcals/day: 2170 Kcals/day (2170-2480kcal)  Protein: 50 g (50g (0.8g/kg) w/ FREDERICK (100-111g (1.6-1.8g/kg) once resolved))  Fluid: 2170 ml (1ml/kcal or per renal)  Based On: Kcal/kg - specify (Comment) (35-40kcal/kg)  Weight Used: Actual wt (62kg)    Pt expected to meet estimated nutrient needs:  []   Yes     [x]  No (without feeds) [] Unable to predict at this time  Nutrition Diagnosis:   1. Inadequate oral intake related to respiratory failure as evidenced by trach with PEG PTA    2.  (Increased energy needs) related to underweight, malnutrition as evidenced by 76% IBW, cachexia    3.  Altered nutrition-related lab values (Decreased protein/Phos/K+ needs) related to FREDERICK on CKD as evidenced by emergent HD, elevated lab values    Goals:     EN to meet at least 90% energy and protein needs in 1-2 days (if consistent w/ pt care goals)     Monitoring & Evaluation:    - Enteral/parenteral nutrition intake, Protein intake, Vitamin intake   - Weight/weight change, Electrolyte and renal profile    Previous Nutrition Goals Met:   N/A  Previous Recommendations:    N/A    Education & Discharge Needs:   [x] None Identified   [] Identified and addressed    [] Participated in care plan, discharge planning, and/or interdisciplinary rounds        Cultural, Mormon and ethnic food preferences identified: None    Skin Integrity: []Intact  [x]Other: unstageable chest; stage 3 sacral  Edema: []None [x]Other: trace  Last BM: PTA  Food Allergies: [x]None []Other  Diet Restrictions: Cultural/Sikh Preference(s): None     Anthropometrics:    Weight Loss Metrics 8/23/2017   Today's Wt 136 lb 11 oz   BMI 18.54 kg/m2      Weight Source: Bed  Height: 6' (182.9 cm),    Body mass index is 18.54 kg/(m^2).   IBW : 80.7 kg (178 lb), % IBW (Calculated): 76.79 %   ,      Labs:    Lab Results   Component Value Date/Time    Sodium 143 08/23/2017 04:21 AM    Potassium 4.3 08/23/2017 04:21 AM    Chloride 107 08/23/2017 04:21 AM    CO2 26 08/23/2017 04:21 AM    Glucose 105 08/23/2017 04:21 AM    BUN 68 08/23/2017 04:21 AM    Creatinine 2.29 08/23/2017 04:21 AM    Calcium 7.7 08/23/2017 04:21 AM    Magnesium 2.9 08/22/2017 01:09 PM    Phosphorus 4.9 08/22/2017 01:09 PM    Albumin 1.7 08/22/2017 10:00 AM     Nathaniel Brown RD

## 2017-08-23 NOTE — PROGRESS NOTES
[facility]  Pulmonary, Critical Care, and Sleep Medicine        Name: Linda Hernandez MRN: [id number]   : 11/10/1933 [facility]   Date: 2017        IMPRESSION:   · Septic shock- likely from GNR UTI- has GNR bacteremia- at risk for ESBLs  · Large sacral decub- eval'd by surgery and not felt to be source of sepsis. · Chronic trach with Acute VDRF- CXR clear. Basilar ATX. · FREDERICK on CKD  · Hyperkalemia  · Prostate ca  · H/o CABG  · H/o PEA arrest  · Chronic trach      RECOMMENDATIONS:   · Change zosyn to meropenem  · Colloid loading  · Check cortisol  · Crystalloid loading  · analgosedation with PRN fent  · FULL CODE  · Pt is critically ill CCT EOP 30 min. Subjective:       Somnolent, does not follow commands. History reviewed. No pertinent surgical history. No Known Allergies      History reviewed. No pertinent family history. Review of Systems:  Review of systems not obtained due to patient factors. Objective:   Vital Signs:    Visit Vitals    /57    Pulse 75    Temp 100.4 °F (38 °C)    Resp 14    Wt 61.1 kg (134 lb 11.2 oz)    SpO2 100%       O2 Device: Ventilator, Tracheostomy       Temp (24hrs), Av.7 °F (37.1 °C), Min:97.6 °F (36.4 °C), Max:100.4 °F (38 °C)       Intake/Output:   Last shift:         Last 3 shifts:  1901 -  0700  In: 1587.1 [I.V.:1587.1]  Out: 195 [Urine:195]    Intake/Output Summary (Last 24 hours) at 17 0948  Last data filed at 17 3873   Gross per 24 hour   Intake          1587.12 ml   Output              195 ml   Net          1392.12 ml      Physical Exam:   General:  Unresponsive on vent   Head:  Normocephalic,    Eyes:  Conjunctivae/corneas clear. Nose: Nares normal. Septum midline. Mucosa normal.    Throat: Lips, mucosa, and tongue normal.    Neck: Supple, symmetrical, trachea midline, no adenopathy, thyroid. Lungs:   Clear to auscultation bilaterally.        Heart: Regular rate and rhythm, S1, S2 normal,    Abdomen:   Soft, non-tender. Bowel sounds normal.    Extremities: Extremities normal, atraumatic, no cyanosis or edema. Pulses: 2+ and symmetric all extremities. Skin: Skin color, texture, turgor normal. No rashes or lesions             Data review:     Recent Results (from the past 24 hour(s))   CULTURE, WOUND W GRAM STAIN    Collection Time: 08/22/17  9:58 AM   Result Value Ref Range    Special Requests: NO SPECIAL REQUESTS      GRAM STAIN 4+ GRAM POSITIVE COCCI      GRAM STAIN OCCASIONAL GRAM NEGATIVE RODS      GRAM STAIN FEW GRAM POSITIVE RODS (CORYNEFORM)      Culture result: PENDING    CBC WITH AUTOMATED DIFF    Collection Time: 08/22/17 10:00 AM   Result Value Ref Range    WBC 20.2 (H) 4.1 - 11.1 K/uL    RBC 3.47 (L) 4.10 - 5.70 M/uL    HGB 9.5 (L) 12.1 - 17.0 g/dL    HCT 33.1 (L) 36.6 - 50.3 %    MCV 95.4 80.0 - 99.0 FL    MCH 27.4 26.0 - 34.0 PG    MCHC 28.7 (L) 30.0 - 36.5 g/dL    RDW 17.3 (H) 11.5 - 14.5 %    PLATELET 384 (H) 304 - 400 K/uL    NEUTROPHILS 84 (H) 32 - 75 %    BAND NEUTROPHILS 1 0 - 6 %    LYMPHOCYTES 9 (L) 12 - 49 %    MONOCYTES 5 5 - 13 %    EOSINOPHILS 1 0 - 7 %    BASOPHILS 0 0 - 1 %    ABS. NEUTROPHILS 17.2 (H) 1.8 - 8.0 K/UL    ABS. LYMPHOCYTES 1.8 0.8 - 3.5 K/UL    ABS. MONOCYTES 1.0 0.0 - 1.0 K/UL    ABS. EOSINOPHILS 0.2 0.0 - 0.4 K/UL    ABS.  BASOPHILS 0.0 0.0 - 0.1 K/UL    DF MANUAL      RBC COMMENTS ANISOCYTOSIS  1+       METABOLIC PANEL, COMPREHENSIVE    Collection Time: 08/22/17 10:00 AM   Result Value Ref Range    Sodium 155 (H) 136 - 145 mmol/L    Potassium 8.6 (HH) 3.5 - 5.1 mmol/L    Chloride 123 (H) 97 - 108 mmol/L    CO2 26 21 - 32 mmol/L    Anion gap 6 5 - 15 mmol/L    Glucose 137 (H) 65 - 100 mg/dL     (H) 6 - 20 MG/DL    Creatinine 4.71 (H) 0.70 - 1.30 MG/DL    BUN/Creatinine ratio 38 (H) 12 - 20      GFR est AA 14 (L) >60 ml/min/1.73m2    GFR est non-AA 12 (L) >60 ml/min/1.73m2    Calcium 8.9 8.5 - 10.1 MG/DL Bilirubin, total 0.5 0.2 - 1.0 MG/DL    ALT (SGPT) 763 (H) 12 - 78 U/L    AST (SGOT) 893 (H) 15 - 37 U/L    Alk. phosphatase 333 (H) 45 - 117 U/L    Protein, total 8.5 (H) 6.4 - 8.2 g/dL    Albumin 1.7 (L) 3.5 - 5.0 g/dL    Globulin 6.8 (H) 2.0 - 4.0 g/dL    A-G Ratio 0.3 (L) 1.1 - 2.2     LACTIC ACID    Collection Time: 08/22/17 10:00 AM   Result Value Ref Range    Lactic acid 2.9 (HH) 0.4 - 2.0 MMOL/L   CULTURE, BLOOD, PAIRED    Collection Time: 08/22/17 10:00 AM   Result Value Ref Range    Special Requests: NO SPECIAL REQUESTS      Culture result: (A)       GRAM NEGATIVE RODS GROWING IN 1 OF 2 BOTTLES DRAWN , EACH FROM A DIFFERENT SITE. .. THIS BOTTLE FRAOM LAC    Culture result:       PRELIMINARY RESULT OF GRAM NEGATIVE RODS  GROWING IN 1 OF 2 BOTTLES DRAWN  CALLED TO AND READ BACK BY  SUNITHA BRO AT 0622, 8/23/17 DB      Culture result: REMAINING BOTTLE(S) HAS/HAVE NO GROWTH SO FAR     TROPONIN I    Collection Time: 08/22/17 10:00 AM   Result Value Ref Range    Troponin-I, Qt. 0.08 (H) <0.05 ng/mL   EKG, 12 LEAD, INITIAL    Collection Time: 08/22/17 10:18 AM   Result Value Ref Range    Ventricular Rate 61 BPM    Atrial Rate 74 BPM    QRS Duration 144 ms    Q-T Interval 466 ms    QTC Calculation (Bezet) 469 ms    Calculated R Axis -74 degrees    Calculated T Axis 51 degrees    Diagnosis       Wide QRS rhythm  Left axis deviation  Right bundle branch block  No previous ECGs available  Confirmed by Sonu Atwood MD, Marianne Talley ([id number]) on 8/22/2017 4:30:46 PM     POC G3 - PUL    Collection Time: 08/22/17 10:28 AM   Result Value Ref Range    FIO2 (POC) 100 %    pH (POC) 7.393 7.35 - 7.45      pCO2 (POC) 42.6 35.0 - 45.0 MMHG    pO2 (POC) 296 (H) 80 - 100 MMHG    HCO3 (POC) 26.0 22 - 26 MMOL/L    sO2 (POC) 100 (H) 92 - 97 %    Base excess (POC) 1 mmol/L    Site LEFT BRACHIAL      Device: VENT      Mode ASSIST CONTROL      Tidal volume 500 ml    Set Rate 14 bpm    PEEP/CPAP (POC) 5.0 cmH2O    Allens test (POC) YES      Specimen type (POC) ARTERIAL      Volume control YES     URINALYSIS W/ REFLEX CULTURE    Collection Time: 08/22/17 12:14 PM   Result Value Ref Range    Color YELLOW/STRAW      Appearance TURBID (A) CLEAR      Specific gravity 1.010 1.003 - 1.030      pH (UA) 7.0 5.0 - 8.0      Protein 100 (A) NEG mg/dL    Glucose NEGATIVE  NEG mg/dL    Ketone NEGATIVE  NEG mg/dL    Bilirubin NEGATIVE  NEG      Blood LARGE (A) NEG      Urobilinogen 0.2 0.2 - 1.0 EU/dL    Nitrites NEGATIVE  NEG      Leukocyte Esterase LARGE (A) NEG      WBC  0 - 4 /hpf    RBC 20-50 0 - 5 /hpf    Epithelial cells FEW FEW /lpf    Bacteria 4+ (A) NEG /hpf    UA:UC IF INDICATED URINE CULTURE ORDERED (A) CNI     CULTURE, RESPIRATORY/SPUTUM/BRONCH W GRAM STAIN    Collection Time: 08/22/17 12:38 PM   Result Value Ref Range    Special Requests: NO SPECIAL REQUESTS      GRAM STAIN 4+ WBCS SEEN      GRAM STAIN OCCASIONAL EPITHELIAL CELLS SEEN      GRAM STAIN 2+ GRAM POSITIVE RODS      GRAM STAIN 2+ GRAM POSITIVE COCCI      GRAM STAIN FEW BUDDING YEAST      Culture result: PENDING    METABOLIC PANEL, BASIC    Collection Time: 08/22/17 12:44 PM   Result Value Ref Range    Sodium 160 (H) 136 - 145 mmol/L    Potassium 6.8 (HH) 3.5 - 5.1 mmol/L    Chloride 128 (H) 97 - 108 mmol/L    CO2 25 21 - 32 mmol/L    Anion gap 7 5 - 15 mmol/L    Glucose 192 (H) 65 - 100 mg/dL     (H) 6 - 20 MG/DL    Creatinine 4.52 (H) 0.70 - 1.30 MG/DL    BUN/Creatinine ratio 37 (H) 12 - 20      GFR est AA 15 (L) >60 ml/min/1.73m2    GFR est non-AA 13 (L) >60 ml/min/1.73m2    Calcium 8.4 (L) 8.5 - 10.1 MG/DL   CK    Collection Time: 08/22/17  1:09 PM   Result Value Ref Range    CK 25 (L) 39 - 308 U/L   PHOSPHORUS    Collection Time: 08/22/17  1:09 PM   Result Value Ref Range    Phosphorus 4.9 (H) 2.6 - 4.7 MG/DL   MAGNESIUM    Collection Time: 08/22/17  1:09 PM   Result Value Ref Range    Magnesium 2.9 (H) 1.6 - 2.4 mg/dL   METABOLIC PANEL, BASIC    Collection Time: 08/22/17  8:21 PM   Result Value Ref Range    Sodium 143 136 - 145 mmol/L    Potassium 4.1 3.5 - 5.1 mmol/L    Chloride 105 97 - 108 mmol/L    CO2 28 21 - 32 mmol/L    Anion gap 10 5 - 15 mmol/L    Glucose 135 (H) 65 - 100 mg/dL    BUN 59 (H) 6 - 20 MG/DL    Creatinine 1.90 (H) 0.70 - 1.30 MG/DL    BUN/Creatinine ratio 31 (H) 12 - 20      GFR est AA 41 (L) >60 ml/min/1.73m2    GFR est non-AA 34 (L) >60 ml/min/1.73m2    Calcium 7.7 (L) 8.5 - 38.5 MG/DL   METABOLIC PANEL, BASIC    Collection Time: 08/23/17  4:21 AM   Result Value Ref Range    Sodium 143 136 - 145 mmol/L    Potassium 4.3 3.5 - 5.1 mmol/L    Chloride 107 97 - 108 mmol/L    CO2 26 21 - 32 mmol/L    Anion gap 10 5 - 15 mmol/L    Glucose 105 (H) 65 - 100 mg/dL    BUN 68 (H) 6 - 20 MG/DL    Creatinine 2.29 (H) 0.70 - 1.30 MG/DL    BUN/Creatinine ratio 30 (H) 12 - 20      GFR est AA 33 (L) >60 ml/min/1.73m2    GFR est non-AA 27 (L) >60 ml/min/1.73m2    Calcium 7.7 (L) 8.5 - 10.1 MG/DL   CBC WITH AUTOMATED DIFF    Collection Time: 08/23/17  4:21 AM   Result Value Ref Range    WBC 21.3 (H) 4.1 - 11.1 K/uL    RBC 2.84 (L) 4.10 - 5.70 M/uL    HGB 7.7 (L) 12.1 - 17.0 g/dL    HCT 25.4 (L) 36.6 - 50.3 %    MCV 89.4 80.0 - 99.0 FL    MCH 27.1 26.0 - 34.0 PG    MCHC 30.3 30.0 - 36.5 g/dL    RDW 17.0 (H) 11.5 - 14.5 %    PLATELET 172 635 - 552 K/uL    NEUTROPHILS 77 (H) 32 - 75 %    BAND NEUTROPHILS 2 0 - 6 %    LYMPHOCYTES 13 12 - 49 %    MONOCYTES 2 (L) 5 - 13 %    EOSINOPHILS 6 0 - 7 %    BASOPHILS 0 0 - 1 %    ABS. NEUTROPHILS 16.8 (H) 1.8 - 8.0 K/UL    ABS. LYMPHOCYTES 2.8 0.8 - 3.5 K/UL    ABS. MONOCYTES 0.4 0.0 - 1.0 K/UL    ABS. EOSINOPHILS 1.3 (H) 0.0 - 0.4 K/UL    ABS.  BASOPHILS 0.0 0.0 - 0.1 K/UL    DF MANUAL      PLATELET COMMENTS CLUMPED PLATELETS      RBC COMMENTS ANISOCYTOSIS  1+        RBC COMMENTS POLYCHROMASIA  1+       PTT    Collection Time: 08/23/17  4:21 AM   Result Value Ref Range    aPTT 36.3 (H) 22.1 - 32.5 sec    aPTT, therapeutic range     58.0 - 77.0 SECS PROTHROMBIN TIME + INR    Collection Time: 08/23/17  4:21 AM   Result Value Ref Range    INR 1.3 (H) 0.9 - 1.1      Prothrombin time 13.8 (H) 9.0 - 11.1 sec       Imaging:  I have personally reviewed the patients radiographs and have reviewed the reports:  PCXR kristofer Coleman MD
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
## 2017-08-23 NOTE — CONSULTS
Palliative Medicine Consult  Doron: [phone] ([phone])    Patient Name: Linda Hernandez  YOB: 1933    Date of Initial Consult: 8/23/17  Reason for Consult: Care decisions   Requesting Provider: Cameron Mercer   Primary Care Physician: Luisa Chinchilla MD      SUMMARY:   Linda Hernandez is a 80 y.o. with a past history of CAD s/p CABG, HTN, DM, prostate cancer, possible CKD who was admitted on 8/22/2017 from [address] [place] [address] with fever and hypotension. Discussed hx w/ wife. As of April this year, pt and wife were living together in apartment- he was able to do his ADLs w/ some assistance but then had a hospital stay at the [facility], then went to [facility]. Soon afteerwards pt had prolonged hospitalization at [facility] on 6/4/17 w/ sepsis during which time was intubated and sedated. Could not be weaned from vent, s/p trach and PEG. This admission found to have GNR UTI and bacteremia on IV abx, as well as FREDERICK w/ hyperkalemia- s/p emergent dialysis. Noted that pt would be a poor long term dialysis candidate. Current medical issues leading to Palliative Medicine involvement include: care decisions. Apparently had hospice at [facility]), remained full code. PALLIATIVE DIAGNOSES:   1. Shortness of breath, on vent  2. Feeding difficulties, on PEG  3. Mult medical issues as above, sepsis, FREDERICK on CKD  4. Debility   5. Sacral decub      PLAN:   1. Along w/ Aravind Rivas LCSW meet w/ wife of 15 years, Lulú Sumner. She is only NOK, pt has children but they do not know where they are. No other family, but she is supported by friends. 2. See Alejandra's note and Summary above for more info, but pt was living w/ wife in apartment and able to do most ADLs before April 2017. Has had several long hospital stays and since late May has not really been able to interact w/ anyone. Has been in/out of NH and hospitals.   3. At this time pt has declined despite full aggressive measures. Was on hospice per notes at [facility], but was full code and uncertain that wife was ready to fully accept the philosphy. 4. However now pt w/ sepsis, FREDERICK on CKD requiring dialysis, resp failure, sacral decub and we talk about how we know all of these things are chronic and are going to worsen no matter if he gets the best care. I think we are now at the point where we know all of these measures are not a bridge to recovery. 5. Pt did indicate at [facility] to wife that he did not want resuscitative measures. Medically, I support a DNR order and also focusing on comfort- explain what that might look like. Could consider switching to full comfort measures here or try to stabilize to get to hospice and then focus only on sx management. 6. Wife wants to try and get pt to a NH near [facility] w/ hospice- so cont current measures. However she relies on medical recommendations as well- she \"doesn't want to do anything that will not help Ren\". I tell her that my opinion is that things like dialysis, pressors- are not going to help him and she is agreement. 7. Plan: Pt is now DNR/allow natural death- DDNR signed by wife. When comes off vent, do not go back on. Goal is to cont current measures w/ hopes of stabilizing him to get to a NH near [facility] w/ hospice so wife can visit more often. If declines, will talk to wife about complete switch to comfort and IP hospice here. 8. Initial consult note routed to primary continuity provider  9. Communicated plan of care with: Palliative IDT; care management; Katheryn HELTON       GOALS OF CARE / TREATMENT PREFERENCES:   [====Goals of Care====]  GOALS OF CARE:  Patient / health care proxy stated goals:  Stabilization to get to [facility] w/ hospice       TREATMENT PREFERENCES:   Code Status: Full Code    Advance Care Planning:  No flowsheet data found.     Other:    The palliative care team has discussed with patient / health care proxy about goals of care / treatment preferences for patient.  [====Goals of Care====]         HISTORY:     History obtained from: family, chart, staff    CHIEF COMPLAINT: Cannot obtain due to patient factors    HPI/SUBJECTIVE:    The patient is:   [] Verbal and participatory  [x] Non-participatory due to: medical condition     Pt w/ above hx, not sedated but only opens eyes, responds to pain. Clinical Pain Assessment (nonverbal scale for severity on nonverbal patients):   [++++ Clinical Pain Assessment++++]  [++++Pain Severity++++]: Pain: 0  [++++Pain Character++++]:   [++++Pain Duration++++]:   [++++Pain Effect++++]:   [++++Pain Factors++++]:   [++++Pain Frequency++++]:   [++++Pain Location++++]:   [++++ Clinical Pain Assessment++++]  Duration: for how long has pt been experiencing pain (e.g., 2 days, 1 month, years)  Frequency: how often pain is an issue (e.g., several times per day, once every few days, constant)     FUNCTIONAL ASSESSMENT:     Palliative Performance Scale (PPS):  PPS: 20       PSYCHOSOCIAL/SPIRITUAL SCREENING:     Advance Care Planning:  No flowsheet data found. Any spiritual / Religion concerns:  [] Yes /  [x] No    Caregiver Burnout:  [] Yes /  [x] No /  [] No Caregiver Present      Anticipatory grief assessment:   [x] Normal  / [] Maladaptive       ESAS Anxiety:   Cannot obtain due to patient factors    ESAS Depression:   Cannot obtain due to patient factors         REVIEW OF SYSTEMS:     Positive and pertinent negative findings in ROS are noted above in HPI. The following systems were [] reviewed / [x] unable to be reviewed as noted in HPI  Other findings are noted below. Systems: constitutional, ears/nose/mouth/throat, respiratory, gastrointestinal, genitourinary, musculoskeletal, integumentary, neurologic, psychiatric, endocrine. Positive findings noted below.   Modified ESAS Completed by: provider   Fatigue: 10 Drowsiness: 10     Pain: 0           Dyspnea: 0     Constipation: No     Stool Occurrence(s): 0        PHYSICAL EXAM:     From RN flowsheet:  Wt Readings from Last 3 Encounters:   08/23/17 136 lb 11 oz (62 kg)     Blood pressure 126/62, pulse 73, temperature 100.4 °F (38 °C), resp. rate 17, height 6' (1.829 m), weight 136 lb 11 oz (62 kg), SpO2 100 %. Pain Scale 1: Adult Nonverbal Pain Scale  Pain Intensity 1: 0                 Constitutional: pale, chronically ill appearing, temporal wasting   Eyes: pupils equal, anicteric  ENMT: no nasal discharge, moist mucous membranes  Cardiovascular: regular rhythm,  Respiratory: breathing not labored, symmetric, trach on vent   Gastrointestinal: soft non-tender, +bowel sounds  Musculoskeletal: no deformity, no tenderness to palpation  Skin: sacral decub, did not examine   Neurologic: withdraws to pain        HISTORY:     Active Problems:    UTI (urinary tract infection) due to urinary indwelling catheter ([facility] [address] (8/22/2017)      Sepsis affecting skin (8/22/2017)      Past Medical History:   Diagnosis Date    CAD (coronary artery disease)     Hx of CABG       History reviewed. No pertinent surgical history. History reviewed. No pertinent family history. History reviewed, no pertinent family history.   Social History   Substance Use Topics    Smoking status: Not on file    Smokeless tobacco: Not on file    Alcohol use Not on file     No Known Allergies   Current Facility-Administered Medications   Medication Dose Route Frequency    meropenem (MERREM) 500 mg in 0.9% sodium chloride (MBP/ADV) 50 mL  0.5 g IntraVENous Q12H    levoFLOXacin (LEVAQUIN) 750 mg in D5W IVPB  750 mg IntraVENous Q48H    albumin human 25% (BUMINATE) solution 12.5 g  12.5 g IntraVENous Q6H    fentaNYL citrate (PF) injection 25 mcg  25 mcg IntraVENous Q1H PRN    gelatin adsorbable (GELFOAM) 12-7 mm sponge   Topical PRN    sodium chloride (NS) flush 5-10 mL  5-10 mL IntraVENous Q8H    sodium chloride (NS) flush 5-10 mL  5-10 mL IntraVENous PRN    acetaminophen (TYLENOL) tablet 650 mg  650 mg Oral Q4H PRN    HYDROcodone-acetaminophen (NORCO) 5-325 mg per tablet 1 Tab  1 Tab Oral Q4H PRN    ondansetron (ZOFRAN ODT) tablet 4 mg  4 mg Oral Q4H PRN    heparin (porcine) injection 5,000 Units  5,000 Units SubCUTAneous Q8H    acetaminophen (TYLENOL) suppository 650 mg  650 mg Rectal Q4H PRN    albuterol-ipratropium (DUO-NEB) 2.5 MG-0.5 MG/3 ML  3 mL Nebulization Q6H RT    aspirin chewable tablet 81 mg  81 mg Per G Tube DAILY    NOREPINephrine (LEVOPHED) 8,000 mcg in dextrose 5% 250 mL infusion  2-16 mcg/min IntraVENous TITRATE    pantoprazole (PROTONIX) 2 mg/mL oral suspension 40 mg  40 mg PEG Tube ACB    collagenase (SANTYL) 250 unit/gram ointment   Topical DAILY    0.9% sodium chloride infusion  100 mL/hr IntraVENous CONTINUOUS          LAB AND IMAGING FINDINGS:     Lab Results   Component Value Date/Time    WBC 21.3 08/23/2017 04:21 AM    HGB 7.7 08/23/2017 04:21 AM    PLATELET 175 20/04/9375 04:21 AM     Lab Results   Component Value Date/Time    Sodium 143 08/23/2017 04:21 AM    Potassium 4.3 08/23/2017 04:21 AM    Chloride 107 08/23/2017 04:21 AM    CO2 26 08/23/2017 04:21 AM    BUN 68 08/23/2017 04:21 AM    Creatinine 2.29 08/23/2017 04:21 AM    Calcium 7.7 08/23/2017 04:21 AM    Magnesium 2.9 08/22/2017 01:09 PM    Phosphorus 4.9 08/22/2017 01:09 PM      Lab Results   Component Value Date/Time    AST (SGOT) 893 08/22/2017 10:00 AM    Alk.  phosphatase 333 08/22/2017 10:00 AM    Protein, total 8.5 08/22/2017 10:00 AM    Albumin 1.7 08/22/2017 10:00 AM    Globulin 6.8 08/22/2017 10:00 AM     Lab Results   Component Value Date/Time    INR 1.3 08/23/2017 04:21 AM    Prothrombin time 13.8 08/23/2017 04:21 AM    aPTT 36.3 08/23/2017 04:21 AM      No results found for: IRON, FE, TIBC, IBCT, PSAT, FERR   No results found for: PH, PCO2, PO2  No components found for: [place]   Lab Results   Component Value Date/Time    CK 25 08/22/2017 01:09 PM                Total time: 75 min Counseling / coordination time, spent as noted above: 55 min   > 50% counseling / coordination?: yes    Prolonged service was provided for  []30 min   []75 min in face to face time in the presence of the patient, spent as noted above. Time Start:   Time End:   Note: this can only be billed with 91580 (initial) or [id number] (follow up). If multiple start / stop times, list each separately.

## 2017-08-23 NOTE — CONSULTS
Surgery Consult    Subjective:      Olivia Munoz is a 80 y.o. male who presents for evaluation of sacral decubitus ulcer. The history is obtained from the chart and the patients wife. The patient was living in [address] and was transferred to [facility] where he had to be intubated . Eventually he required a trach and PEG. He had hypoxemic respiratory failure. He also had cdiff at that time. He was in the nursing home until last night when he developed. Fever and dyspnea. He was sent here with a septic type picture. It is unclear how long he has had a sacral decubitus. They have been caring for it a the facility . Upon arrival here the patient was in renal failure he currently is on pressors. PAST MEDICAL HISTORY: Of the patient again is:    1. Atrial fibrillation. 2. Hypotension. 3. Altered mental status. 4. History of C. Difficile colitis. 5. Coronary artery disease. 6. Hypertension. 7. Hyperlipidemia. 8. CABG. 9. Decubitus ulcer. 10. Status post cardiac arrest.    11. History of prolonged urosepsis. 12. Chronic obstructive pulmonary disease.     Past Medical History:   Diagnosis Date    CAD (coronary artery disease)     Hx of CABG        Social History     Social History    Marital status:      Spouse name: N/A    Number of children: N/A    Years of education: N/A     Social History Main Topics    Smoking status: None    Smokeless tobacco: None    Alcohol use None    Drug use: None    Sexual activity: Not Asked     Other Topics Concern    None     Social History Narrative    None      Current Facility-Administered Medications   Medication Dose Route Frequency Provider Last Rate Last Dose    sodium chloride (NS) flush 5-10 mL  5-10 mL IntraVENous Q8H Neelima Grigsby MD   10 mL at 08/22/17 1507    sodium chloride (NS) flush 5-10 mL  5-10 mL IntraVENous PRN Neelima Grigsby MD        acetaminophen (TYLENOL) tablet 650 mg  650 mg Oral Q4H PRN Heraclio Schultz MD        HYDROcodone-acetaminophen (NORCO) 5-325 mg per tablet 1 Tab  1 Tab Oral Q4H PRN Heraclio Schultz MD        ondansetron (ZOFRAN ODT) tablet 4 mg  4 mg Oral Q4H PRN Heraclio Schultz MD        heparin (porcine) injection 5,000 Units  5,000 Units SubCUTAneous Natividad Chowdhury MD   5,000 Units at 08/22/17 1506    acetaminophen (TYLENOL) suppository 650 mg  650 mg Rectal Q4H PRN Heraclio Schultz MD        albuterol-ipratropium (DUO-NEB) 2.5 MG-0.5 MG/3 ML  3 mL Nebulization Q6H RT Heraclio Schultz MD        [START ON 8/23/2017] aspirin chewable tablet 81 mg  81 mg Per G Tube DAILY Heraclio Schultz MD        NOREPINephrine (LEVOPHED) 8,000 mcg in dextrose 5% 250 mL infusion  2-16 mcg/min IntraVENous TITRATE Heraclio Schultz MD 30 mL/hr at 08/22/17 2018 16 mcg/min at 08/22/17 2018    pantoprazole (PROTONIX) 2 mg/mL oral suspension 40 mg  40 mg PEG Tube ACB Heraclio Schultz MD   40 mg at 08/22/17 1606    sodium polystyrene (KAYEXALATE) 15 gram/60 mL oral suspension 45 g  45 g Per Aime Cui MD   Stopped at 08/22/17 1600    collagenase (SANTYL) 250 unit/gram ointment   Topical DAILY Jayne Carpenter MD        0.9% sodium chloride infusion  100 mL/hr IntraVENous CONTINUOUS Huong Weaver  mL/hr at 08/22/17 1914 100 mL/hr at 08/22/17 1914        No Known Allergies  Review of Systems:  Review of systems not obtained due to patient factors.     Objective:      Patient Vitals for the past 8 hrs:   BP Temp Pulse Resp SpO2 Weight   08/22/17 2014 - 98.2 °F (36.8 °C) - - - -   08/22/17 1845 151/84 - 74 18 100 % -   08/22/17 1830 118/73 - 86 19 100 % -   08/22/17 1815 120/65 - 85 20 100 % -   08/22/17 1802 - - 86 21 100 % -   08/22/17 1800 107/72 - 88 17 100 % -   08/22/17 1745 147/80 - 87 26 100 % -   08/22/17 1730 105/70 - 86 18 100 % -   08/22/17 1715 114/85 - 89 22 100 % -   08/22/17 1700 101/79 - 89 19 97 % -   08/22/17 1645 109/73 - 87 23 100 % -   17 1630 115/83 - 80 23 100 % -   17 1615 (!) 79/60 - 85 18 100 % -   17 1600 (!) 73/55 - 82 19 100 % -   17 1545 (!) 89/78 97.6 °F (36.4 °C) 78 14 98 % -   17 1515 (!) 85/68 - 84 23 100 % -   17 1500 - - 87 29 100 % -   17 1445 105/57 - 86 20 100 % -   17 1430 116/64 97.6 °F (36.4 °C) 90 21 100 % 134 lb 11.2 oz (61.1 kg)   17 1415 95/60 98.2 °F (36.8 °C) 84 22 100 % -   17 1400 111/53 - 88 19 98 % -   17 1345 107/57 98.2 °F (36.8 °C) 88 19 98 % -   17 1330 90/54 - 92 16 100 % -   17 1316 (!) 78/52 98.4 °F (36.9 °C) 93 17 98 % -   17 1300 (!) 77/59 - 98 18 99 % -   17 1240 102/63 - (!) 106 20 100 % -       Temp (24hrs), Av.2 °F (36.8 °C), Min:97.6 °F (36.4 °C), Max:98.8 °F (37.1 °C)      Physical Exam:  GENERAL: alert, mild distress, appears stated age, EYE: negative, THROAT & NECK: normal, LUNG: clear to auscultation bilaterally, HEART: regular rate and rhythm, ABDOMEN: soft, non-tender. Bowel sounds normal. No masses,  no organomegaly, EXTREMITIES:  extremities normal, atraumatic, no cyanosis or edema, no edema, SKIN: there is a stage III-IV sacral decubitus ulcer.  There is some loose fibrinous tissue in the wound but no pus. , NEUROLOGIC: positive findings: disoriented,    Recent Results (from the past 24 hour(s))   CULTURE, WOUND W GRAM STAIN    Collection Time: 17  9:58 AM   Result Value Ref Range    Special Requests: NO SPECIAL REQUESTS      GRAM STAIN 4+ GRAM POSITIVE COCCI      GRAM STAIN OCCASIONAL GRAM NEGATIVE RODS      GRAM STAIN FEW GRAM POSITIVE RODS (CORYNEFORM)      Culture result: PENDING    CBC WITH AUTOMATED DIFF    Collection Time: 17 10:00 AM   Result Value Ref Range    WBC 20.2 (H) 4.1 - 11.1 K/uL    RBC 3.47 (L) 4.10 - 5.70 M/uL    HGB 9.5 (L) 12.1 - 17.0 g/dL    HCT 33.1 (L) 36.6 - 50.3 %    MCV 95.4 80.0 - 99.0 FL    MCH 27.4 26.0 - 34.0 PG    MCHC 28.7 (L) 30.0 - 36.5 g/dL RDW 17.3 (H) 11.5 - 14.5 %    PLATELET 293 (H) 883 - 400 K/uL    NEUTROPHILS 84 (H) 32 - 75 %    BAND NEUTROPHILS 1 0 - 6 %    LYMPHOCYTES 9 (L) 12 - 49 %    MONOCYTES 5 5 - 13 %    EOSINOPHILS 1 0 - 7 %    BASOPHILS 0 0 - 1 %    ABS. NEUTROPHILS 17.2 (H) 1.8 - 8.0 K/UL    ABS. LYMPHOCYTES 1.8 0.8 - 3.5 K/UL    ABS. MONOCYTES 1.0 0.0 - 1.0 K/UL    ABS. EOSINOPHILS 0.2 0.0 - 0.4 K/UL    ABS. BASOPHILS 0.0 0.0 - 0.1 K/UL    DF MANUAL      RBC COMMENTS ANISOCYTOSIS  1+       METABOLIC PANEL, COMPREHENSIVE    Collection Time: 08/22/17 10:00 AM   Result Value Ref Range    Sodium 155 (H) 136 - 145 mmol/L    Potassium 8.6 (HH) 3.5 - 5.1 mmol/L    Chloride 123 (H) 97 - 108 mmol/L    CO2 26 21 - 32 mmol/L    Anion gap 6 5 - 15 mmol/L    Glucose 137 (H) 65 - 100 mg/dL     (H) 6 - 20 MG/DL    Creatinine 4.71 (H) 0.70 - 1.30 MG/DL    BUN/Creatinine ratio 38 (H) 12 - 20      GFR est AA 14 (L) >60 ml/min/1.73m2    GFR est non-AA 12 (L) >60 ml/min/1.73m2    Calcium 8.9 8.5 - 10.1 MG/DL    Bilirubin, total 0.5 0.2 - 1.0 MG/DL    ALT (SGPT) 763 (H) 12 - 78 U/L    AST (SGOT) 893 (H) 15 - 37 U/L    Alk.  phosphatase 333 (H) 45 - 117 U/L    Protein, total 8.5 (H) 6.4 - 8.2 g/dL    Albumin 1.7 (L) 3.5 - 5.0 g/dL    Globulin 6.8 (H) 2.0 - 4.0 g/dL    A-G Ratio 0.3 (L) 1.1 - 2.2     LACTIC ACID    Collection Time: 08/22/17 10:00 AM   Result Value Ref Range    Lactic acid 2.9 (HH) 0.4 - 2.0 MMOL/L   TROPONIN I    Collection Time: 08/22/17 10:00 AM   Result Value Ref Range    Troponin-I, Qt. 0.08 (H) <0.05 ng/mL   EKG, 12 LEAD, INITIAL    Collection Time: 08/22/17 10:18 AM   Result Value Ref Range    Ventricular Rate 61 BPM    Atrial Rate 74 BPM    QRS Duration 144 ms    Q-T Interval 466 ms    QTC Calculation (Bezet) 469 ms    Calculated R Axis -74 degrees    Calculated T Axis 51 degrees    Diagnosis       Wide QRS rhythm  Left axis deviation  Right bundle branch block  No previous ECGs available  Confirmed by Sonu Atwood MD, Marianne Talley ([id number]) on 8/22/2017 4:30:46 PM     POC G3 - PUL    Collection Time: 08/22/17 10:28 AM   Result Value Ref Range    FIO2 (POC) 100 %    pH (POC) 7.393 7.35 - 7.45      pCO2 (POC) 42.6 35.0 - 45.0 MMHG    pO2 (POC) 296 (H) 80 - 100 MMHG    HCO3 (POC) 26.0 22 - 26 MMOL/L    sO2 (POC) 100 (H) 92 - 97 %    Base excess (POC) 1 mmol/L    Site LEFT BRACHIAL      Device: VENT      Mode ASSIST CONTROL      Tidal volume 500 ml    Set Rate 14 bpm    PEEP/CPAP (POC) 5.0 cmH2O    Allens test (POC) YES      Specimen type (POC) ARTERIAL      Volume control YES     URINALYSIS W/ REFLEX CULTURE    Collection Time: 08/22/17 12:14 PM   Result Value Ref Range    Color YELLOW/STRAW      Appearance TURBID (A) CLEAR      Specific gravity 1.010 1.003 - 1.030      pH (UA) 7.0 5.0 - 8.0      Protein 100 (A) NEG mg/dL    Glucose NEGATIVE  NEG mg/dL    Ketone NEGATIVE  NEG mg/dL    Bilirubin NEGATIVE  NEG      Blood LARGE (A) NEG      Urobilinogen 0.2 0.2 - 1.0 EU/dL    Nitrites NEGATIVE  NEG      Leukocyte Esterase LARGE (A) NEG      WBC  0 - 4 /hpf    RBC 20-50 0 - 5 /hpf    Epithelial cells FEW FEW /lpf    Bacteria 4+ (A) NEG /hpf    UA:UC IF INDICATED URINE CULTURE ORDERED (A) CNI     CULTURE, RESPIRATORY/SPUTUM/BRONCH W GRAM STAIN    Collection Time: 08/22/17 12:38 PM   Result Value Ref Range    Special Requests: NO SPECIAL REQUESTS      GRAM STAIN 4+ WBCS SEEN      GRAM STAIN OCCASIONAL EPITHELIAL CELLS SEEN      GRAM STAIN 2+ GRAM POSITIVE RODS      GRAM STAIN 2+ GRAM POSITIVE COCCI      GRAM STAIN FEW BUDDING YEAST      Culture result: PENDING    METABOLIC PANEL, BASIC    Collection Time: 08/22/17 12:44 PM   Result Value Ref Range    Sodium 160 (H) 136 - 145 mmol/L    Potassium 6.8 (HH) 3.5 - 5.1 mmol/L    Chloride 128 (H) 97 - 108 mmol/L    CO2 25 21 - 32 mmol/L    Anion gap 7 5 - 15 mmol/L    Glucose 192 (H) 65 - 100 mg/dL     (H) 6 - 20 MG/DL    Creatinine 4.52 (H) 0.70 - 1.30 MG/DL    BUN/Creatinine ratio 37 (H) 12 - 20 GFR est AA 15 (L) >60 ml/min/1.73m2    GFR est non-AA 13 (L) >60 ml/min/1.73m2    Calcium 8.4 (L) 8.5 - 10.1 MG/DL   CK    Collection Time: 08/22/17  1:09 PM   Result Value Ref Range    CK 25 (L) 39 - 308 U/L   PHOSPHORUS    Collection Time: 08/22/17  1:09 PM   Result Value Ref Range    Phosphorus 4.9 (H) 2.6 - 4.7 MG/DL   MAGNESIUM    Collection Time: 08/22/17  1:09 PM   Result Value Ref Range    Magnesium 2.9 (H) 1.6 - 2.4 mg/dL     Wound  Culture-    GRAM STAIN      Preliminary     4+ GRAM POSITIVE COCCI     GRAM STAIN      Preliminary     OCCASIONAL GRAM NEGATIVE RODS     GRAM STAIN      Preliminary     FEW GRAM POSITIVE RODS (CORYNEFORM)     Culture result: PENDING             Assessment:     Hospital Problems  Never Reviewed          Codes Class Noted POA    UTI (urinary tract infection) due to urinary indwelling catheter [facility]) ICD-10-CM: T83.511A, N39.0  ICD-9-CM: 996.64, 599.0  8/22/2017 Unknown        Sepsis affecting skin ICD-10-CM: L02.91  ICD-9-CM: 682.9  8/22/2017 Unknown              Plan:   79 yo with sacral decubitus ulcer- I do not think that this is the source of his sepsis. While there is some fibrinous exudate the wound over all is pretty clean. I did not appreciate any stool within it. At this point I think that his UTI is the more likely source of sepsis  For now I have ordered santyl to the wound- This should help it clean up If not may do some bedside debridement. Will follow.      Signed By: Haroon Baez MD     August 22, 2017

## 2017-08-23 NOTE — PROGRESS NOTES
0730. Bedside and Verbal shift change report given to Scarlet Wade RN by Claire Carpenter RN. Report included the following information SBAR.

## 2017-08-23 NOTE — PROGRESS NOTES
RENAL  PROGRESS NOTE        Subjective:    Seward Howell at bedside  Objective:   VITALS SIGNS:    Visit Vitals    /57    Pulse 75    Temp 100.4 °F (38 °C)    Resp 14    Wt 61.1 kg (134 lb 11.2 oz)    SpO2 100%       O2 Device: Ventilator, Tracheostomy       Temp (24hrs), Av.7 °F (37.1 °C), Min:97.6 °F (36.4 °C), Max:100.4 °F (38 °C)         PHYSICAL EXAM:  No edema  Open ehes  abd soft    DATA REVIEW:     INTAKE / OUTPUT:   Last shift:         Last 3 shifts:  190 -  0700  In: 1587.1 [I.V.:1587.1]  Out: 195 [Urine:195]    Intake/Output Summary (Last 24 hours) at 17 0948  Last data filed at 17 0659   Gross per 24 hour   Intake          1587.12 ml   Output              195 ml   Net          1392.12 ml         LABS:   Recent Labs      17   0421  17   1000   WBC  21.3*  20.2*   HGB  7.7*  9.5*   HCT  25.4*  33.1*   PLT  293  413*     Recent Labs      17   0421  17   2021  17   1309  17   1244  17   1000   NA  143  143   --   160*  155*   K  4.3  4.1   --   6.8*  8.6*   CL  107  105   --   128*  123*   CO2  26  28   --   25  26   GLU  105*  135*   --   192*  137*   BUN  68*  59*   --   167*  181*   CREA  2.29*  1.90*   --   4.52*  4.71*   CA  7.7*  7.7*   --   8.4*  8.9   MG   --    --   2.9*   --    --    PHOS   --    --   4.9*   --    --    ALB   --    --    --    --   1.7*   TBILI   --    --    --    --   0.5   SGOT   --    --    --    --   893*   ALT   --    --    --    --   763*   INR  1.3*   --    --    --    --            Assessment:   FREDERICK,? Baseline likely septic ATN/hypotension  with chronic gutierrez,very high BUN;a.p urgent dialysis on 17  kimberly on 17  CKD ?  Baseline  prostate CA as per wife  Severe hyperkalemia,s.p   urgent dialysis    Septic shock;gram neg rods bacteremia likely from urospesis    VDRF  cachexia  Plan:   No need for dialysis today  AB  Monitor H/H  Check PSA  Had a long talk with wife,he will be a poor longterm dialysis candidate  recom to call hospice    Will follow  Alexus Almonte MD

## 2017-08-23 NOTE — PROGRESS NOTES
Day #1 of levaquin and merrem  Indication:  Septic shock- likely from GNR UTI- has GNR bacteremia- at risk for ESBLs  -sacral decubitus (not likely the source of sepsis)    Current regimen:  750 mg IV q24h                                500 mg IV q6h    Recent Labs      17   0421  17   2021  17   1244  17   1000   WBC  21.3*   --    --   20.2*   CREA  2.29*  1.90*  4.52*  4.71*   BUN  68*  59*  167*  181*     Est CrCl: ~20 ml/min - FREDERICK? Temp (24hrs), Av.7 °F (37.1 °C), Min:97.6 °F (36.4 °C), Max:100.4 °F (38 °C)    Cultures:    sacral wound - GN and GP, pending   blood - GNRs in  urine - GNRs > 100K   resp - pending      Plan:   Levaquin dose adjusted to 750 mg IV q 48 hrs for crcl 10-25. Merrem dose adjusted to 500 mg IV q 12 hrs. Noted that patient received HD on . No need for HD today. Reassess on . May need to change to 500 mg IV q 48 hrs and 500 mg q 24 hrs respectively if continued need for HD.

## 2017-08-23 NOTE — PROGRESS NOTES
Bedside shift change report received from Thomas cMcray RN. Report included the following information SBAR, Kardex, Procedure Summary, Intake/Output, MAR and Recent Results. 2000: VSS, on vent, NSR. Levophed infusing. Non-interactive. Wife at bedside to spend the night, jose provided. Unable to assess CAM.  0430: Central lines saturated dressings w/ blood, controlled. Dressings changes, surgicel applied. Coags sent w/ AM labs.

## 2017-08-23 NOTE — PROGRESS NOTES
Spiritual Care Assessment/Progress Notes    Cris Bo [id number]  [id number]    11/10/1933  80 y.o.  male    Patient Telephone Number: [phone] (home)   Baptism Affiliation:    Language: English   Extended Emergency Contact Information  Primary Emergency Contact: [address] Phone: [phone]  Relation: Spouse   Patient Active Problem List    Diagnosis Date Noted    UTI (urinary tract infection) due to urinary indwelling catheter ([facility] [address] 08/22/2017    Sepsis affecting skin 08/22/2017        Date: 8/23/2017       Level of Baptism/Spiritual Activity:  []         Involved in christopher tradition/spiritual practice    []         Not involved in christopher tradition/spiritual practice  []         Spiritually oriented    []         Claims no spiritual orientation    []         seeking spiritual identity  []         Feels alienated from Taoism practice/tradition  []         Feels angry about Taoism practice/tradition  []         Spirituality/Taoism tradition a resource for coping at this time.   [x]         Not able to assess due to medical condition    Services Provided Today:  []         crisis intervention    []         reading Scriptures  []         spiritual assessment    []         prayer  []         empathic listening/emotional support  []         rites and rituals (cite in comments)  []         life review     []         Taoism support  []         theological development   []         advocacy  []         ethical dialog     []         blessing  []         bereavement support    []         support to family  []         anticipatory grief support   []         help with AMD  []         spiritual guidance    []         meditation      Spiritual Care Needs  []         Emotional Support  []         Spiritual/Baptism Care  []         Loss/Adjustment  []         Advocacy/Referral                /Ethics  []         No needs expressed at               this time  [] Other: (note in               comments)  Spiritual Care Plan  []         Follow up visits with               pt/family  []         Provide materials  []         Schedule sacraments  []         Contact Community               Clergy  [x]         Follow up as needed  []         Other: (note in               comments)     Comments: Pt's chart reviewed prior to visit and spoke with pt's nurse. Pt did not appear to respond to 's presence. Offered words of comfort and assurance. Left my card with note for pt's wife. Pt's wife had been at the hospital earlier but had left the hospital to return home for a while. She met with Palliative team earlier today. Will attempt to follow-up as able in hopes of connecting with pt's wife.     Machelle Aceves, Palliative

## 2017-08-23 NOTE — CDMP QUERY
After further study, do you concur with the findings of \"septic shock\" noted in the Pulmonary and Nephrology Consultation notes? Other Explanation of the clinical findings  Unable to Determine (no explanation for clinical findings)    The medical record reflects the following clinical findings, risk factors and treatment:     Risk Factors:  sepsis w acute resp failure    Clinical Indicators: H&P: GENERAL APPEARANCE: This is an 80year-old patient, chronically   and acutely ill with pressors as well as ventilator at this time. 8/22 RENAL  FREDERICK,? Baseline likely septic ATN/hypotension  with chronic gutierrez,very high BUN        Severe hyperkalemia,s.p medical trt needing urgent dialysis        Septic shock  8/23 NEPHRO   Septic shock;gram neg rods bacteremia likely from urospesis  cachexia    8/23 PULM   Septic shock- likely from GNR UTI- has GNR bacteremia- at risk for ESBLs    Treatment: Pressors; NS bolus; IV antbx    Please clarify and document your clinical opinion in the progress notes and discharge summary including the definitive and/or presumptive diagnosis, (suspected or probable), related to the above clinical findings. Please include clinical findings supporting your diagnosis.     Thank Isaiah Notice [facility]  [phone]

## 2017-08-23 NOTE — ROUTINE PROCESS
1430. Primary Nurse Junior Graham RN and Laurent Lagos RN performed a dual skin assessment on this patient Impairment noted- see wound doc flow sheet  Parker score is 10.

## 2017-08-24 LAB
ANION GAP SERPL CALC-SCNC: 11 MMOL/L (ref 5–15)
BACTERIA SPEC CULT: ABNORMAL
BASOPHILS # BLD: 0 K/UL (ref 0–0.1)
BASOPHILS NFR BLD: 0 % (ref 0–1)
BUN SERPL-MCNC: 75 MG/DL (ref 6–20)
BUN/CREAT SERPL: 25 (ref 12–20)
CALCIUM SERPL-MCNC: 7.5 MG/DL (ref 8.5–10.1)
CC UR VC: ABNORMAL
CHLORIDE SERPL-SCNC: 112 MMOL/L (ref 97–108)
CO2 SERPL-SCNC: 22 MMOL/L (ref 21–32)
CREAT SERPL-MCNC: 3.02 MG/DL (ref 0.7–1.3)
DIFFERENTIAL METHOD BLD: ABNORMAL
EOSINOPHIL # BLD: 0.8 K/UL (ref 0–0.4)
EOSINOPHIL NFR BLD: 7 % (ref 0–7)
ERYTHROCYTE [DISTWIDTH] IN BLOOD BY AUTOMATED COUNT: 16.9 % (ref 11.5–14.5)
GLUCOSE BLD STRIP.AUTO-MCNC: 120 MG/DL (ref 65–100)
GLUCOSE BLD STRIP.AUTO-MCNC: 77 MG/DL (ref 65–100)
GLUCOSE BLD STRIP.AUTO-MCNC: 81 MG/DL (ref 65–100)
GLUCOSE BLD STRIP.AUTO-MCNC: 82 MG/DL (ref 65–100)
GLUCOSE BLD STRIP.AUTO-MCNC: 83 MG/DL (ref 65–100)
GLUCOSE BLD STRIP.AUTO-MCNC: 90 MG/DL (ref 65–100)
GLUCOSE SERPL-MCNC: 70 MG/DL (ref 65–100)
GRAM STN SPEC: ABNORMAL
HCT VFR BLD AUTO: 21 % (ref 36.6–50.3)
HGB BLD-MCNC: 6.3 G/DL (ref 12.1–17)
LYMPHOCYTES # BLD: 1 K/UL (ref 0.8–3.5)
LYMPHOCYTES NFR BLD: 8 % (ref 12–49)
MCH RBC QN AUTO: 26.6 PG (ref 26–34)
MCHC RBC AUTO-ENTMCNC: 30 G/DL (ref 30–36.5)
MCV RBC AUTO: 88.6 FL (ref 80–99)
MONOCYTES # BLD: 0.5 K/UL (ref 0–1)
MONOCYTES NFR BLD: 4 % (ref 5–13)
NEUTS SEG # BLD: 9.6 K/UL (ref 1.8–8)
NEUTS SEG NFR BLD: 81 % (ref 32–75)
PLATELET # BLD AUTO: 203 K/UL (ref 150–400)
POTASSIUM SERPL-SCNC: 3.8 MMOL/L (ref 3.5–5.1)
RBC # BLD AUTO: 2.37 M/UL (ref 4.1–5.7)
RBC MORPH BLD: ABNORMAL
SERVICE CMNT-IMP: ABNORMAL
SERVICE CMNT-IMP: NORMAL
SODIUM SERPL-SCNC: 145 MMOL/L (ref 136–145)
WBC # BLD AUTO: 11.9 K/UL (ref 4.1–11.1)

## 2017-08-24 PROCEDURE — 87340 HEPATITIS B SURFACE AG IA: CPT | Performed by: INTERNAL MEDICINE

## 2017-08-24 PROCEDURE — 36591 DRAW BLOOD OFF VENOUS DEVICE: CPT

## 2017-08-24 PROCEDURE — 74011000250 HC RX REV CODE- 250: Performed by: INTERNAL MEDICINE

## 2017-08-24 PROCEDURE — 94003 VENT MGMT INPAT SUBQ DAY: CPT

## 2017-08-24 PROCEDURE — 30233N1 TRANSFUSION OF NONAUTOLOGOUS RED BLOOD CELLS INTO PERIPHERAL VEIN, PERCUTANEOUS APPROACH: ICD-10-PCS | Performed by: HOSPITALIST

## 2017-08-24 PROCEDURE — 77030020186 HC BOOT HL PROTCT SAGE -B

## 2017-08-24 PROCEDURE — P9016 RBC LEUKOCYTES REDUCED: HCPCS | Performed by: HOSPITALIST

## 2017-08-24 PROCEDURE — 74011250636 HC RX REV CODE- 250/636: Performed by: INTERNAL MEDICINE

## 2017-08-24 PROCEDURE — 77030021668 HC NEB PREFIL KT VYRM -A

## 2017-08-24 PROCEDURE — 85025 COMPLETE CBC W/AUTO DIFF WBC: CPT | Performed by: INTERNAL MEDICINE

## 2017-08-24 PROCEDURE — 36430 TRANSFUSION BLD/BLD COMPNT: CPT

## 2017-08-24 PROCEDURE — 94640 AIRWAY INHALATION TREATMENT: CPT

## 2017-08-24 PROCEDURE — 80048 BASIC METABOLIC PNL TOTAL CA: CPT | Performed by: INTERNAL MEDICINE

## 2017-08-24 PROCEDURE — P9047 ALBUMIN (HUMAN), 25%, 50ML: HCPCS | Performed by: INTERNAL MEDICINE

## 2017-08-24 PROCEDURE — 65610000006 HC RM INTENSIVE CARE

## 2017-08-24 PROCEDURE — 36415 COLL VENOUS BLD VENIPUNCTURE: CPT | Performed by: INTERNAL MEDICINE

## 2017-08-24 PROCEDURE — 74011250637 HC RX REV CODE- 250/637: Performed by: INTERNAL MEDICINE

## 2017-08-24 PROCEDURE — 77030008793 HC TU TRACH CUF COVD -B

## 2017-08-24 PROCEDURE — 74011250636 HC RX REV CODE- 250/636: Performed by: HOSPITALIST

## 2017-08-24 PROCEDURE — 74011000258 HC RX REV CODE- 258: Performed by: INTERNAL MEDICINE

## 2017-08-24 PROCEDURE — 77030009834 HC MSK O2 TRACH VYRM -A

## 2017-08-24 PROCEDURE — 82962 GLUCOSE BLOOD TEST: CPT

## 2017-08-24 RX ORDER — SODIUM CHLORIDE 9 MG/ML
250 INJECTION, SOLUTION INTRAVENOUS AS NEEDED
Status: DISCONTINUED | OUTPATIENT
Start: 2017-08-24 | End: 2017-09-02 | Stop reason: HOSPADM

## 2017-08-24 RX ORDER — DEXTROSE MONOHYDRATE 50 MG/ML
50 INJECTION, SOLUTION INTRAVENOUS CONTINUOUS
Status: DISCONTINUED | OUTPATIENT
Start: 2017-08-24 | End: 2017-08-26

## 2017-08-24 RX ORDER — DEXTROSE 50 % IN WATER (D50W) INTRAVENOUS SYRINGE
25 AS NEEDED
Status: DISCONTINUED | OUTPATIENT
Start: 2017-08-24 | End: 2017-09-02 | Stop reason: HOSPADM

## 2017-08-24 RX ORDER — LEVOFLOXACIN 5 MG/ML
500 INJECTION, SOLUTION INTRAVENOUS
Status: DISCONTINUED | OUTPATIENT
Start: 2017-08-25 | End: 2017-08-25

## 2017-08-24 RX ADMIN — HEPARIN SODIUM 5000 UNITS: 5000 INJECTION, SOLUTION INTRAVENOUS; SUBCUTANEOUS at 15:23

## 2017-08-24 RX ADMIN — Medication 10 ML: at 22:11

## 2017-08-24 RX ADMIN — IPRATROPIUM BROMIDE AND ALBUTEROL SULFATE 3 ML: .5; 3 SOLUTION RESPIRATORY (INHALATION) at 20:05

## 2017-08-24 RX ADMIN — IPRATROPIUM BROMIDE AND ALBUTEROL SULFATE 3 ML: .5; 3 SOLUTION RESPIRATORY (INHALATION) at 07:48

## 2017-08-24 RX ADMIN — ALBUMIN (HUMAN) 12.5 G: 0.25 INJECTION, SOLUTION INTRAVENOUS at 05:44

## 2017-08-24 RX ADMIN — IPRATROPIUM BROMIDE AND ALBUTEROL SULFATE 3 ML: .5; 3 SOLUTION RESPIRATORY (INHALATION) at 13:11

## 2017-08-24 RX ADMIN — SODIUM CHLORIDE 100 ML/HR: 900 INJECTION, SOLUTION INTRAVENOUS at 04:03

## 2017-08-24 RX ADMIN — DEXTROSE MONOHYDRATE 12.5 G: 25 INJECTION, SOLUTION INTRAVENOUS at 05:43

## 2017-08-24 RX ADMIN — PANTOPRAZOLE SODIUM 40 MG: 40 TABLET, DELAYED RELEASE ORAL at 06:36

## 2017-08-24 RX ADMIN — COLLAGENASE SANTYL: 250 OINTMENT TOPICAL at 08:19

## 2017-08-24 RX ADMIN — HEPARIN SODIUM 5000 UNITS: 5000 INJECTION, SOLUTION INTRAVENOUS; SUBCUTANEOUS at 22:12

## 2017-08-24 RX ADMIN — ASPIRIN 81 MG 81 MG: 81 TABLET ORAL at 08:59

## 2017-08-24 RX ADMIN — ALBUMIN (HUMAN) 12.5 G: 0.25 INJECTION, SOLUTION INTRAVENOUS at 00:01

## 2017-08-24 RX ADMIN — Medication 10 ML: at 08:11

## 2017-08-24 RX ADMIN — Medication 10 ML: at 13:46

## 2017-08-24 RX ADMIN — HEPARIN SODIUM 5000 UNITS: 5000 INJECTION, SOLUTION INTRAVENOUS; SUBCUTANEOUS at 06:36

## 2017-08-24 RX ADMIN — MEROPENEM 500 MG: 500 INJECTION, POWDER, FOR SOLUTION INTRAVENOUS at 10:54

## 2017-08-24 RX ADMIN — IPRATROPIUM BROMIDE AND ALBUTEROL SULFATE 3 ML: .5; 3 SOLUTION RESPIRATORY (INHALATION) at 01:18

## 2017-08-24 RX ADMIN — DEXTROSE MONOHYDRATE 50 ML/HR: 5 INJECTION, SOLUTION INTRAVENOUS at 11:53

## 2017-08-24 NOTE — PALLIATIVE CARE
Palliative Medicine Social Work    Checked in on patient. No family present. He was minimally responsive to my presence. Did not appear in any distress. Will continue to support as needed. Thank you for the opportunity to be involved in the care of Mr. Nilda Bran. Alejandra Skinner, ABBYW, [facility]-  Palliative Medicine   Respecting Choices ® ACP Facilitator   [phone]

## 2017-08-24 NOTE — PROGRESS NOTES
0800 Bedside and Verbal shift change report given to SIERRA Douglas RN (oncoming nurse) by Anthony Phillips RN (offgoing nurse). Report included the following information SBAR, Kardex, ED Summary, Procedure Summary, Intake/Output, MAR, Accordion, Recent Results and Alarm Parameters . Problem: Falls - Risk of  Goal: *Absence of Falls  Document James Fall Risk and appropriate interventions in the flowsheet. Outcome: Progressing Towards Goal  Fall Risk Interventions:     Mentation Interventions: Adequate sleep, hydration, pain control, Door open when patient unattended, Evaluate medications/consider consulting pharmacy, More frequent rounding  Medication Interventions: Evaluate medications/consider consulting pharmacy, Patient to call before getting OOB  Elimination Interventions: Bed/chair exit alarm, Call light in reach, Patient to call for help with toileting needs, Elevated toilet seat, Toilet paper/wipes in reach     Problem: Pressure Injury - Risk of  Goal: *Prevention of pressure ulcer  Outcome: Progressing Towards Goal  Q2hr turning, dressings c/d/i. Problem: Ventilator Management  Goal: *Normal spontaneous ventilation  Outcome: Resolved/Met Date Met:  08/24/17  Patient extubated to tracheal collar 30%    2000 Bedside and Verbal shift change report given to Makeda Zuniga (oncoming nurse) by Janina Ferrari RN (offgoing nurse). Report included the following information SBAR, Kardex, ED Summary, Procedure Summary, Intake/Output, MAR, Accordion and Recent Results.

## 2017-08-24 NOTE — PROGRESS NOTES
Palliative Medicine Consult  Doron: [phone] ([phone])    Patient Name: Sheeba Kelley  YOB: 1933    Date of Initial Consult: 8/23/17  Reason for Consult: Care decisions   Requesting Provider: Kim Moeller   Primary Care Physician: Adryan Kincaid MD      SUMMARY:   Sheeba Kelley is a 80 y.o. with a past history of CAD s/p CABG, HTN, DM, prostate cancer, possible CKD who was admitted on 8/22/2017 from [address] [place] [address] with fever and hypotension. Discussed hx w/ wife. As of April this year, pt and wife were living together in apartment- he was able to do his ADLs w/ some assistance but then had a hospital stay at the [address] [place] [address], then went to [facility]. Soon afteerwards pt had prolonged hospitalization at [facility] on 6/4/17 w/ sepsis during which time was intubated and sedated. Could not be weaned from vent, s/p trach and PEG. This admission found to have GNR UTI and bacteremia on IV abx, as well as FREDERICK w/ hyperkalemia- s/p emergent dialysis. Noted that pt would be a poor long term dialysis candidate. Current medical issues leading to Palliative Medicine involvement include: care decisions. Apparently had hospice at [facility]), remained full code. PALLIATIVE DIAGNOSES:   1. Shortness of breath, on vent  2. Feeding difficulties, on PEG  3. Mult medical issues as above, sepsis, FREDERICK on CKD  4. Debility   5. Sacral decub      PLAN:   1. Pt off vent, not following commands for me but is for some staff. 2. Speak to wife Loyd Roldan, who was also updated by ICU team- she is aware that pt came off vent, but pt still very ill w/ chronic conditions and at high risk for decline. Worsening UOP and Cr up today. 3. Wife does not want to feel that she is \"killing\" patient- thus for her, it is harder to take something away (eg taking pt off the vent is much harder for her than not putting him back on in the first place).  She understands how sick pt is, and that this is not good quality of life for the pt- as he would define it. However she understandably is having a hard time. 4. Wife tells me that she trusts medical recommendations. In the past, apparently at the NH she went back/forth between decision making (eg code status, hospice). 5. The medical team needs to give her clear recommendations- I think she will respond best to this. For dialysis, for example- if we don't think that dialysis is going to help pt in the long run, she needs concrete recommendations- I told her that my recommendation is not to do dialysis which she agreed with. Appreciate speaking w/ Dr Bev Gutierrez who will speak to wife as well to confirm. 6. As of now, plan is to cont current measures but is complete DNR/I (is partial code for pressors). Dr Bev Gutierrez to speak w/ wife again about dialysis. 7. Confirmed w/ wife that if pt's resp status worsens, she does not want him to go back on the vent, and instead do aggressive sx management and change to comfort measures. If this happens, call her (cell phone not in [email or web address]- [phone])  8. Care management checking to see if NH [facility] [place] [facility] can take pt w/ hospice. 9. Initial consult note routed to primary continuity provider  10.  Communicated plan of care with: Palliative IDT; care management; SUNITHA Rivers        GOALS OF CARE / TREATMENT PREFERENCES:   [====Goals of Care====]  GOALS OF CARE:  Patient / health care proxy stated goals:  Stabilization to get to [facility] w/ hospice       TREATMENT PREFERENCES:   Code Status: Partial Code    Advance Care Planning:  Advance Care Planning 8/23/2017   Patient's Healthcare Decision Maker is: Legal Next of Kin   Confirm Advance Directive None   Patient Would Like to Complete Advance Directive No       Other:    The palliative care team has discussed with patient / health care proxy about goals of care / treatment preferences for patient.  [====Goals of Care====]         HISTORY: HPI/SUBJECTIVE:    The patient is:   [] Verbal and participatory  [x] Non-participatory due to: medical condition     Pt w/ eyes open, but is not responding to me. Off vent. Labs reviewed. Clinical Pain Assessment (nonverbal scale for severity on nonverbal patients):   [++++ Clinical Pain Assessment++++]  [++++Pain Severity++++]: Pain: 0  [++++Pain Character++++]:   [++++Pain Duration++++]:   [++++Pain Effect++++]:   [++++Pain Factors++++]:   [++++Pain Frequency++++]:   [++++Pain Location++++]:   [++++ Clinical Pain Assessment++++]  Duration: for how long has pt been experiencing pain (e.g., 2 days, 1 month, years)  Frequency: how often pain is an issue (e.g., several times per day, once every few days, constant)     FUNCTIONAL ASSESSMENT:     Palliative Performance Scale (PPS):  PPS: 20       PSYCHOSOCIAL/SPIRITUAL SCREENING:     Advance Care Planning:  Advance Care Planning 8/23/2017   Patient's Healthcare Decision Maker is: Legal Next of Kin   Confirm Advance Directive None   Patient Would Like to Complete Advance Directive No        Any spiritual / Alevism concerns:  [] Yes /  [x] No    Caregiver Burnout:  [] Yes /  [x] No /  [] No Caregiver Present      Anticipatory grief assessment:   [x] Normal  / [] Maladaptive       ESAS Anxiety:   Cannot obtain due to patient factors    ESAS Depression:   Cannot obtain due to patient factors         REVIEW OF SYSTEMS:     Positive and pertinent negative findings in ROS are noted above in HPI. The following systems were [] reviewed / [x] unable to be reviewed as noted in HPI  Other findings are noted below. Systems: constitutional, ears/nose/mouth/throat, respiratory, gastrointestinal, genitourinary, musculoskeletal, integumentary, neurologic, psychiatric, endocrine. Positive findings noted below.   Modified ESAS Completed by: provider   Fatigue: 10 Drowsiness: 10     Pain: 0           Dyspnea: 0     Constipation: No     Stool Occurrence(s): 1        PHYSICAL EXAM:     From RN flowsheet:  Wt Readings from Last 3 Encounters:   08/24/17 131 lb 6.3 oz (59.6 kg)     Blood pressure 125/62, pulse 82, temperature 98 °F (36.7 °C), resp. rate 24, height 6' (1.829 m), weight 131 lb 6.3 oz (59.6 kg), SpO2 100 %. Pain Scale 1: Adult Nonverbal Pain Scale  Pain Intensity 1: 0                 Constitutional: pale, chronically ill appearing, temporal wasting   Eyes: pupils equal, anicteric  ENMT: no nasal discharge, moist mucous membranes  Respiratory: breathing not labored, symmetric, trach   Gastrointestinal: soft non-tender, +bowel sounds  Musculoskeletal: no deformity, no tenderness to palpation  Skin: sacral decub, did not examine   Neurologic: withdraws to pain        HISTORY:     Active Problems:    UTI (urinary tract infection) due to urinary indwelling catheter ([facility] [address] (8/22/2017)      Sepsis affecting skin (8/22/2017)      Past Medical History:   Diagnosis Date    CAD (coronary artery disease)     Hx of CABG       History reviewed. No pertinent surgical history. History reviewed. No pertinent family history. History reviewed, no pertinent family history.   Social History   Substance Use Topics    Smoking status: Not on file    Smokeless tobacco: Not on file    Alcohol use Not on file     No Known Allergies   Current Facility-Administered Medications   Medication Dose Route Frequency    dextrose (D50W) injection syrg 25 g  25 g IntraVENous PRN    0.9% sodium chloride infusion 250 mL  250 mL IntraVENous PRN    [START ON 8/25/2017] levoFLOXacin (LEVAQUIN) 500 mg in D5W IVPB  500 mg IntraVENous Q48H    [START ON 8/25/2017] meropenem (MERREM) 500 mg in 0.9% sodium chloride (MBP/ADV) 50 mL  0.5 g IntraVENous Q24H    dextrose 5% infusion  50 mL/hr IntraVENous CONTINUOUS    fentaNYL citrate (PF) injection 25 mcg  25 mcg IntraVENous Q1H PRN    gelatin adsorbable (GELFOAM) 12-7 mm sponge   Topical PRN    sodium chloride (NS) flush 5-10 mL  5-10 mL IntraVENous Q8H    sodium chloride (NS) flush 5-10 mL  5-10 mL IntraVENous PRN    acetaminophen (TYLENOL) tablet 650 mg  650 mg Oral Q4H PRN    HYDROcodone-acetaminophen (NORCO) 5-325 mg per tablet 1 Tab  1 Tab Oral Q4H PRN    ondansetron (ZOFRAN ODT) tablet 4 mg  4 mg Oral Q4H PRN    heparin (porcine) injection 5,000 Units  5,000 Units SubCUTAneous Q8H    acetaminophen (TYLENOL) suppository 650 mg  650 mg Rectal Q4H PRN    albuterol-ipratropium (DUO-NEB) 2.5 MG-0.5 MG/3 ML  3 mL Nebulization Q6H RT    aspirin chewable tablet 81 mg  81 mg Per G Tube DAILY    NOREPINephrine (LEVOPHED) 8,000 mcg in dextrose 5% 250 mL infusion  2-16 mcg/min IntraVENous TITRATE    pantoprazole (PROTONIX) 2 mg/mL oral suspension 40 mg  40 mg PEG Tube ACB    collagenase (SANTYL) 250 unit/gram ointment   Topical DAILY          LAB AND IMAGING FINDINGS:     Lab Results   Component Value Date/Time    WBC 11.9 08/24/2017 04:01 AM    HGB 6.3 08/24/2017 04:01 AM    PLATELET 810 19/79/2754 04:01 AM     Lab Results   Component Value Date/Time    Sodium 145 08/24/2017 04:01 AM    Potassium 3.8 08/24/2017 04:01 AM    Chloride 112 08/24/2017 04:01 AM    CO2 22 08/24/2017 04:01 AM    BUN 75 08/24/2017 04:01 AM    Creatinine 3.02 08/24/2017 04:01 AM    Calcium 7.5 08/24/2017 04:01 AM    Magnesium 2.9 08/22/2017 01:09 PM    Phosphorus 4.9 08/22/2017 01:09 PM      Lab Results   Component Value Date/Time    AST (SGOT) 893 08/22/2017 10:00 AM    Alk.  phosphatase 333 08/22/2017 10:00 AM    Protein, total 8.5 08/22/2017 10:00 AM    Albumin 1.7 08/22/2017 10:00 AM    Globulin 6.8 08/22/2017 10:00 AM     Lab Results   Component Value Date/Time    INR 1.3 08/23/2017 04:21 AM    Prothrombin time 13.8 08/23/2017 04:21 AM    aPTT 36.3 08/23/2017 04:21 AM      No results found for: IRON, FE, TIBC, IBCT, PSAT, FERR   No results found for: PH, PCO2, PO2  No components found for: [place]   Lab Results   Component Value Date/Time    CK 25 08/22/2017 01:09 PM                Total time: 35 min   Counseling / coordination time, spent as noted above: 25 min   > 50% counseling / coordination?: yes    Prolonged service was provided for  []30 min   []75 min in face to face time in the presence of the patient, spent as noted above. Time Start:   Time End:   Note: this can only be billed with 06305 (initial) or 60257 (follow up). If multiple start / stop times, list each separately.

## 2017-08-24 NOTE — PROGRESS NOTES
Bedside shift change report received from Lola Glover Rn. Report included the following information SBAR, Kardex, Procedure Summary, Intake/Output, MAR and Recent Results. 2000: VSS, on vent 30%. Levophed infusing. Unable to assess CAM.  2200: Blood-tinged urine noted, w/ sediment. 0100: Levophed off.  0400: No changes. Shift Summary: Uneventful shift, VSS, on vent 30%. Levophed off. Hypoglycemia. Hgb 6.3, pink-tinged urine.

## 2017-08-24 NOTE — PROGRESS NOTES
Hospitalist Progress Note  Gwyn Hamilton MD  Office: [phone]        Date of Service:  2017  NAME:  Hillary Hermosillo  :  11/10/1933  MRN:  [id number]      Admission Summary:   The patient is an 71-year-old patient who originally was a resident of [place], [place]. He   was admitted in [facility] on 2017 due to hypotension, hyperventilation and confusion. Since the patient was unable to be extubated while in the care in the ICU, then tracheostomy and PEG was done and the patient was transferred to a long term care facility   that was in [place]. his morning the patient continued with shortness of breath, for which we will transfer the patient to our ER. While in our ER, the patient was connected to the ventilator through the tracheostomy. Interval history / Subjective:   Extubated to trache collar , getting HD   Will order 1 unit of PRBC     Assessment & Plan:     1. Sepsis with septic shock from indwelling gutierrez cath secondary to UTI c/s GNR > 100k  On merrem fluid resuscitation per protocol on levophed/ albimun MGMT per PCCM     2.  acute and chronic renal failure,due to hypotension from sepsis,  aggressive hydration  Nephrology consulted      3. Hyperkalemic, hypernatremic, dehydration - from renal failure,  improved      4. Acute hypoxic Respiratory failure - extubated to trache collar  Further management will be as per PCCM     5. Coronary artery disease - status post CABG. Symptomatic treatment at   this time in this patient.      6. Decubital ulcer - no debridement planned per surgery     7.  Anemia - possibly from chronic dz monitor H and H will give 1 unit BT PRBC     Code status:DNR  DVT prophylaxis:SQH    Care Plan discussed with: Patient/Family and Nurse  Disposition: TBD   Poor prognosis Hospice appropriate      Hospital Problems  Never Reviewed          Codes Class Noted POA    UTI (urinary tract infection) due to urinary indwelling catheter [facility]) ICD-10-CM: T83.511A, N39.0  ICD-9-CM: 996.64, 599.0  8/22/2017 Unknown        Sepsis affecting skin ICD-10-CM: L02.91  ICD-9-CM: 682.9  8/22/2017 Unknown                Review of Systems:   NOT DONE DUE TO PT FACTOR       Vital Signs:    Last 24hrs VS reviewed since prior progress note. Most recent are:  Visit Vitals    /58    Pulse 87    Temp 98 °F (36.7 °C)    Resp 23    Ht 6' (1.829 m)    Wt 59.6 kg (131 lb 6.3 oz)    SpO2 98%    BMI 17.82 kg/m2         Intake/Output Summary (Last 24 hours) at 08/24/17 1040  Last data filed at 08/24/17 0900   Gross per 24 hour   Intake          2668.14 ml   Output              422 ml   Net          2246.14 ml        Physical Examination:             Constitutional:  Trache collar   ENT:  trache to vent   Resp:  CTA bilaterally. CV:  Regular rhythm, normal rate    GI:  Soft, non distended,     Musculoskeletal:  No edema, warm, 2+ pulses throughout    Neurologic:  on vent             Data Review:    I personally reviewed  Image and LABS      Labs:     Recent Labs      08/24/17   0401 08/23/17 0421   WBC  11.9*  21.3*   HGB  6.3*  7.7*   HCT  21.0*  25.4*   PLT  203  293     Recent Labs      08/24/17   0401  08/23/17   0421  08/22/17 2021 08/22/17   1309   NA  145  143  143   --    K  3.8  4.3  4.1   --    CL  112*  107  105   --    CO2  22  26  28   --    BUN  75*  68*  59*   --    CREA  3.02*  2.29*  1.90*   --    GLU  70  105*  135*   --    CA  7.5*  7.7*  7.7*   --    MG   --    --    --   2.9*   PHOS   --    --    --   4.9*     Recent Labs      08/22/17   1000   SGOT  893*   ALT  763*   AP  333*   TBILI  0.5   TP  8.5*   ALB  1.7*   GLOB  6.8*     Recent Labs      08/23/17   0421   INR  1.3*   PTP  13.8*   APTT  36.3*      No results for input(s): FE, TIBC, PSAT, FERR in the last 72 hours. No results found for: FOL, RBCF   No results for input(s): PH, PCO2, PO2 in the last 72 hours.   Recent Labs 08/22/17   1309  08/22/17   1000   CPK  25*   --    TROIQ   --   0.08*     No results found for: CHOL, CHOLX, CHLST, CHOLV, HDL, LDL, LDLC, DLDLP, TGLX, TRIGL, TRIGP, CHHD, CHHDX  Lab Results   Component Value Date/Time    Glucose (POC) 120 08/24/2017 05:48 AM    Glucose (POC) 77 08/24/2017 05:34 AM    Glucose (POC) 81 08/24/2017 04:02 AM    Glucose (POC) 82 08/24/2017 12:00 AM     Lab Results   Component Value Date/Time    Color YELLOW/STRAW 08/22/2017 12:14 PM    Appearance TURBID 08/22/2017 12:14 PM    Specific gravity 1.010 08/22/2017 12:14 PM    pH (UA) 7.0 08/22/2017 12:14 PM    Protein 100 08/22/2017 12:14 PM    Glucose NEGATIVE  08/22/2017 12:14 PM    Ketone NEGATIVE  08/22/2017 12:14 PM    Bilirubin NEGATIVE  08/22/2017 12:14 PM    Urobilinogen 0.2 08/22/2017 12:14 PM    Nitrites NEGATIVE  08/22/2017 12:14 PM    Leukocyte Esterase LARGE 08/22/2017 12:14 PM    Epithelial cells FEW 08/22/2017 12:14 PM    Bacteria 4+ 08/22/2017 12:14 PM    WBC  08/22/2017 12:14 PM    RBC 20-50 08/22/2017 12:14 PM         Medications Reviewed:     Current Facility-Administered Medications   Medication Dose Route Frequency    dextrose (D50W) injection syrg 25 g  25 g IntraVENous PRN    0.9% sodium chloride infusion 250 mL  250 mL IntraVENous PRN    meropenem (MERREM) 500 mg in 0.9% sodium chloride (MBP/ADV) 50 mL  0.5 g IntraVENous Q12H    levoFLOXacin (LEVAQUIN) 750 mg in D5W IVPB  750 mg IntraVENous Q48H    fentaNYL citrate (PF) injection 25 mcg  25 mcg IntraVENous Q1H PRN    gelatin adsorbable (GELFOAM) 12-7 mm sponge   Topical PRN    sodium chloride (NS) flush 5-10 mL  5-10 mL IntraVENous Q8H    sodium chloride (NS) flush 5-10 mL  5-10 mL IntraVENous PRN    acetaminophen (TYLENOL) tablet 650 mg  650 mg Oral Q4H PRN    HYDROcodone-acetaminophen (NORCO) 5-325 mg per tablet 1 Tab  1 Tab Oral Q4H PRN    ondansetron (ZOFRAN ODT) tablet 4 mg  4 mg Oral Q4H PRN    heparin (porcine) injection 5,000 Units 5,000 Units SubCUTAneous Q8H    acetaminophen (TYLENOL) suppository 650 mg  650 mg Rectal Q4H PRN    albuterol-ipratropium (DUO-NEB) 2.5 MG-0.5 MG/3 ML  3 mL Nebulization Q6H RT    aspirin chewable tablet 81 mg  81 mg Per G Tube DAILY    NOREPINephrine (LEVOPHED) 8,000 mcg in dextrose 5% 250 mL infusion  2-16 mcg/min IntraVENous TITRATE    pantoprazole (PROTONIX) 2 mg/mL oral suspension 40 mg  40 mg PEG Tube ACB    collagenase (SANTYL) 250 unit/gram ointment   Topical DAILY    0.9% sodium chloride infusion  100 mL/hr IntraVENous CONTINUOUS     ______________________________________________________________________  EXPECTED LENGTH OF STAY: 4d 21h  ACTUAL LENGTH OF STAY:          2                 Harinder Cabrera MD

## 2017-08-24 NOTE — PROGRESS NOTES
Subjective  Pt being evaluated for hospice    Temp:  [97.6 °F (36.4 °C)-100.4 °F (38 °C)]   Pulse (Heart Rate):  []   BP: ()/()   Resp Rate:  [8-29]   O2 Sat (%):  [96 %-100 %]   Weight:  [131 lb 6.3 oz (59.6 kg)-136 lb 11 oz (62 kg)]   08/24 0701 - 08/24 1900  In: 527.5 [I.V.:157.5]  Out: 20 [Urine:20]  08/22 1901 - 08/24 0700  In: 4568.9 [I.V.:4538.9]  Out: 160 [Urine:572]      Objective  Sacrum- Still with some fibrinous exudate  Wound redressed with santyl     Active Problems:    UTI (urinary tract infection) due to urinary indwelling [facility] (8/22/2017)      Sepsis affecting skin (8/22/2017)          Assessment & Plan  Sacral decubitus- contintue dressing changes

## 2017-08-24 NOTE — PROGRESS NOTES
RENAL  PROGRESS NOTE        Subjective:    COMPLAINT:intubated, intubated,poor  UO  Objective:   VITALS SIGNS:    Visit Vitals    /56 (BP 1 Location: Left arm, BP Patient Position: At rest)    Pulse 70    Temp 98 °F (36.7 °C)    Resp 15    Ht 6' (1.829 m)    Wt 59.6 kg (131 lb 6.3 oz)    SpO2 100%    BMI 17.82 kg/m2       O2 Device: Ventilator, Tracheostomy       Temp (24hrs), Av.9 °F (37.2 °C), Min:97.8 °F (36.6 °C), Max:100.2 °F (37.9 °C)         PHYSICAL EXAM:  No edema  Open ehes  abd soft    DATA REVIEW:     INTAKE / OUTPUT:   Last shift:       07 - 1900  In: 101.7 [I.V.:101.7]  Out: 10 [Urine:10]  Last 3 shifts:  190 -  0700  In: 4568.9 [I.V.:4538.9]  Out: 572 [Urine:572]    Intake/Output Summary (Last 24 hours) at 17 0833  Last data filed at 17 0800   Gross per 24 hour   Intake          2905.74 ml   Output              447 ml   Net          2458.74 ml         LABS:   Recent Labs      17   0401  17   0421  17   1000   WBC  11.9*  21.3*  20.2*   HGB  6.3*  7.7*  9.5*   HCT  21.0*  25.4*  33.1*   PLT  203  293  413*     Recent Labs      17   0401  17   0421  17   20217   1309   17   1000   NA  145  143  143   --    < >  155*   K  3.8  4.3  4.1   --    < >  8.6*   CL  112*  107  105   --    < >  123*   CO2  22  26  28   --    < >  26   GLU  70  105*  135*   --    < >  137*   BUN  75*  68*  59*   --    < >  181*   CREA  3.02*  2.29*  1.90*   --    < >  4.71*   CA  7.5*  7.7*  7.7*   --    < >  8.9   MG   --    --    --   2.9*   --    --    PHOS   --    --    --   4.9*   --    --    ALB   --    --    --    --    --   1.7*   TBILI   --    --    --    --    --   0.5   SGOT   --    --    --    --    --   893*   ALT   --    --    --    --    --   763*   INR   --   1.3*   --    --    --    --     < > = values in this interval not displayed. Assessment:   FREDERICK,?  Baseline likely septic ATN/hypotension  with chronic gutierrez,very high BUN;s.p urgent dialysis on 8.22.17  kimberly on 8.22.17  CKD ?  Baseline  prostate CA as per wife  Severe hyperkalemia,s.p   urgent dialysis    Septic shock;gram neg rods bacteremia likely from urospesis    VDRF  Cachexia  Anemia as per first team  Plan:    dialysis today  AB  Monitor H/H,transfusion a sper first team  Check PSA  Had a long talk with wife,he will be a poor longterm dialysis candidate  recom to call hospice  Will follow       Andreina Garzon MD

## 2017-08-24 NOTE — PROGRESS NOTES
0800 Received report, assumed care. Patient opens eyes to voice, to stimulus. Following commands. MAEx4. Pupils equal, round, brisk 2mm. Nodding to communicate but unsure of patient's understanding of situation. Assist control ventilator setting, tolerating well. Suctioning green thick secretions from tracheostomy. Levophed gtt off with MAP >65 maintained. Afebrile. 0900 RRT at bedside for SBT. Patient tolerating well.    [id number] Dr Yandel Horowitz at bedside rounding on patient. Per MD if patient continues to tolerate SBT transition to tracheal collar. 1000 Patient continues to tolerate SBT, pulling adequate volumes, RR 25, O2 sat 99%. Placed on tracheal collar 30% FiO2 10L by RRT. 1030 Dr Wisam Velez rounding on patient, noted recent extubation and Palliative Care MD's recent note. MD ordering 1 unit PRBC's. Lynn RN at bedside for scheduled HD. Type and cross sent to lab. RR 23, O2 sat 100%, patient denies difficulty breathing. 1315 PRBC's infusing during HD.     1400 PRBC infusion completed. 1430 HD completed. ~600 ml removed per Lynn HELTON. [address] to reach [employer] Souzaing, the patient's wife, at her home phone number. Spoke with her about patient not requiring ventilator for oxygenation. Wife expressed concern about code status, seemed unsure about her decisions. Palliative medicine paged. 36 Palliative MD on unit, per MD confirmed patient's current code status with wife. No use of ventilator at this point. No new orders. 1600 No changes to previous assessment. SHIFT SUMMARY: Patient transitioned to tracheal collar 30% FiO2, breathing without ventilator support without difficulty. Wife aware that patient is not on the ventilator at this time and agrees with current plan. Moderate amt of green/yellow secretions suctioned from trach. Received positive culture results today for ESBL in wound/urine/trans tracheal, per MD patient is covered by meropenem ABX. SHANTANU CAM. RASS -1.

## 2017-08-24 NOTE — PROGRESS NOTES
Attended interdisciplinary rounds in ICU. : RevCarolee Hawthorne.  Umair Chin; [facility]; to contact [facility] call: [phone]

## 2017-08-24 NOTE — PROGRESS NOTES
[facility]  Pulmonary, Critical Care, and Sleep Medicine        Name: Elina Pham MRN: [id number]   : 11/10/1933 Hospital: [place] [address]   Date: 2017        IMPRESSION:   · Sepsis- likely from [address] UTI- has GNR bacteremia- at risk for ESBLs. Off pressor. · Large sacral decub- eval'd by surgery and not felt to be source of sepsis. · Chronic trach with Acute VDRF- CXR clear. Basilar ATX. · FREDERICK on CKD  · Hyperkalemia  · Prostate ca  · H/o CABG  · H/o PEA arrest  · Chronic trach      RECOMMENDATIONS:   · Continue meropenem  · SBT and then TC  · Renal following  · PRN fent  · Nutrition  · Awaiting Id of GNR from blood and urine  · Renal US without pyelo or abscess     Subjective:       More alert following commands. Review of Systems:  Review of systems not obtained due to patient factors. Objective:   Vital Signs:    Visit Vitals    /56 (BP 1 Location: Left arm, BP Patient Position: At rest)    Pulse 70    Temp 98 °F (36.7 °C)    Resp 15    Ht 6' (1.829 m)    Wt 59.6 kg (131 lb 6.3 oz)    SpO2 100%    BMI 17.82 kg/m2       O2 Device: Ventilator, Tracheostomy       Temp (24hrs), Av.9 °F (37.2 °C), Min:97.8 °F (36.6 °C), Max:100.2 °F (37.9 °C)       Intake/Output:   Last shift:      701 - 1900  In: 101.7 [I.V.:101.7]  Out: 10 [Urine:10]  Last 3 shifts: 1901 -  07  In: 4568.9 [I.V.:4538.9]  Out: 572 [Urine:572]    Intake/Output Summary (Last 24 hours) at 17 0926  Last data filed at 17 0800   Gross per 24 hour   Intake          2786.94 ml   Output              437 ml   Net          2349.94 ml      Physical Exam:   General:  Unresponsive on vent   Head:  Normocephalic,    Eyes:  Conjunctivae/corneas clear. Nose: Nares normal. Septum midline. Mucosa normal.    Throat: Lips, mucosa, and tongue normal.    Neck: Supple, symmetrical, trachea midline, no adenopathy, thyroid.        Lungs:   Clear to auscultation bilaterally. Heart:  Regular rate and rhythm, S1, S2 normal,    Abdomen:   Soft, non-tender. Bowel sounds normal.    Extremities: Extremities normal, atraumatic, no cyanosis or edema. Pulses: 2+ and symmetric all extremities. Skin: Skin color, texture, turgor normal. No rashes or lesions             Data review:     Recent Results (from the past 24 hour(s))   CORTISOL    Collection Time: 08/23/17 10:05 AM   Result Value Ref Range    Cortisol, random 19.7 ug/dL   LACTIC ACID    Collection Time: 08/23/17 10:05 AM   Result Value Ref Range    Lactic acid 1.0 0.4 - 2.0 MMOL/L   GLUCOSE, POC    Collection Time: 08/24/17 12:00 AM   Result Value Ref Range    Glucose (POC) 82 65 - 100 mg/dL    Performed by [address] [place] [address], BASIC    Collection Time: 08/24/17  4:01 AM   Result Value Ref Range    Sodium 145 136 - 145 mmol/L    Potassium 3.8 3.5 - 5.1 mmol/L    Chloride 112 (H) 97 - 108 mmol/L    CO2 22 21 - 32 mmol/L    Anion gap 11 5 - 15 mmol/L    Glucose 70 65 - 100 mg/dL    BUN 75 (H) 6 - 20 MG/DL    Creatinine 3.02 (H) 0.70 - 1.30 MG/DL    BUN/Creatinine ratio 25 (H) 12 - 20      GFR est AA 24 (L) >60 ml/min/1.73m2    GFR est non-AA 20 (L) >60 ml/min/1.73m2    Calcium 7.5 (L) 8.5 - 10.1 MG/DL   CBC WITH AUTOMATED DIFF    Collection Time: 08/24/17  4:01 AM   Result Value Ref Range    WBC 11.9 (H) 4.1 - 11.1 K/uL    RBC 2.37 (L) 4.10 - 5.70 M/uL    HGB 6.3 (L) 12.1 - 17.0 g/dL    HCT 21.0 (L) 36.6 - 50.3 %    MCV 88.6 80.0 - 99.0 FL    MCH 26.6 26.0 - 34.0 PG    MCHC 30.0 30.0 - 36.5 g/dL    RDW 16.9 (H) 11.5 - 14.5 %    PLATELET 143 871 - 742 K/uL    NEUTROPHILS 81 (H) 32 - 75 %    LYMPHOCYTES 8 (L) 12 - 49 %    MONOCYTES 4 (L) 5 - 13 %    EOSINOPHILS 7 0 - 7 %    BASOPHILS 0 0 - 1 %    ABS. NEUTROPHILS 9.6 (H) 1.8 - 8.0 K/UL    ABS. LYMPHOCYTES 1.0 0.8 - 3.5 K/UL    ABS. MONOCYTES 0.5 0.0 - 1.0 K/UL    ABS. EOSINOPHILS 0.8 (H) 0.0 - 0.4 K/UL    ABS.  BASOPHILS 0.0 0.0 - 0.1 K/UL    DF SMEAR SCANNED RBC COMMENTS ANISOCYTOSIS  1+       GLUCOSE, POC    Collection Time: 08/24/17  4:02 AM   Result Value Ref Range    Glucose (POC) 81 65 - 100 mg/dL    Performed by Krystal Mackay, POC    Collection Time: 08/24/17  5:34 AM   Result Value Ref Range    Glucose (POC) 77 65 - 100 mg/dL    Performed by Krystal Mackay, POC    Collection Time: 08/24/17  5:48 AM   Result Value Ref Range    Glucose (POC) 120 (H) 65 - 100 mg/dL    Performed by Irlanda Hernández MD

## 2017-08-24 NOTE — DIABETES MGMT
DTC Progress Note    Recommendations/ Comments: Chart review for hypoglycemia. BG 77 mg/dL at 0534 today. Otherwise ranging 81 - 120 mg/dL. Please document po intake    Chart reviewed on Linda Hernandez. Patient is a 80 y.o. male with known DM on Novolin sliding scale 2 - 10 units PTA. Many health issues this year resulting in PEG and trach. Off vent now; remains critically ill,. Palliative Care note appreciated. A1c:   No results found for: HBA1C, HGBE8, LCV1QNZI    Recent Glucose Results:   Lab Results   Component Value Date/Time    GLU 70 08/24/2017 04:01 AM    GLUCPOC 83 08/24/2017 02:12 PM    GLUCPOC 120 (H) 08/24/2017 05:48 AM    GLUCPOC 77 08/24/2017 05:34 AM        Lab Results   Component Value Date/Time    Creatinine 3.02 08/24/2017 04:01 AM     Estimated Creatinine Clearance: 15.6 mL/min (based on Cr of 3.02). Active Orders   There are no active orders of the following type(s): Diet. PO intake: No data found. Current hospital DM medication: none    Will continue to follow as needed.     Thank you  Bud Begum RN, CDE

## 2017-08-25 LAB
ABO + RH BLD: NORMAL
ANION GAP SERPL CALC-SCNC: 6 MMOL/L (ref 5–15)
BACTERIA SPEC CULT: ABNORMAL
BASOPHILS # BLD: 0 K/UL (ref 0–0.1)
BASOPHILS NFR BLD: 0 % (ref 0–1)
BLD PROD TYP BPU: NORMAL
BLOOD GROUP ANTIBODIES SERPL: NORMAL
BPU ID: NORMAL
BUN SERPL-MCNC: 29 MG/DL (ref 6–20)
BUN/CREAT SERPL: 15 (ref 12–20)
CALCIUM SERPL-MCNC: 7.4 MG/DL (ref 8.5–10.1)
CHLORIDE SERPL-SCNC: 103 MMOL/L (ref 97–108)
CO2 SERPL-SCNC: 29 MMOL/L (ref 21–32)
CREAT SERPL-MCNC: 1.91 MG/DL (ref 0.7–1.3)
CROSSMATCH RESULT,%XM: NORMAL
EOSINOPHIL # BLD: 0.8 K/UL (ref 0–0.4)
EOSINOPHIL NFR BLD: 7 % (ref 0–7)
ERYTHROCYTE [DISTWIDTH] IN BLOOD BY AUTOMATED COUNT: 16.3 % (ref 11.5–14.5)
GLUCOSE BLD STRIP.AUTO-MCNC: 98 MG/DL (ref 65–100)
GLUCOSE SERPL-MCNC: 93 MG/DL (ref 65–100)
GRAM STN SPEC: ABNORMAL
HBV SURFACE AG SER QL: <0.1 INDEX
HBV SURFACE AG SER QL: NEGATIVE
HCT VFR BLD AUTO: 25.2 % (ref 36.6–50.3)
HGB BLD-MCNC: 7.9 G/DL (ref 12.1–17)
LYMPHOCYTES # BLD: 0.9 K/UL (ref 0.8–3.5)
LYMPHOCYTES NFR BLD: 9 % (ref 12–49)
MCH RBC QN AUTO: 27.3 PG (ref 26–34)
MCHC RBC AUTO-ENTMCNC: 31.3 G/DL (ref 30–36.5)
MCV RBC AUTO: 87.2 FL (ref 80–99)
MONOCYTES # BLD: 0.5 K/UL (ref 0–1)
MONOCYTES NFR BLD: 5 % (ref 5–13)
NEUTS SEG # BLD: 8.1 K/UL (ref 1.8–8)
NEUTS SEG NFR BLD: 79 % (ref 32–75)
PLATELET # BLD AUTO: 178 K/UL (ref 150–400)
POTASSIUM SERPL-SCNC: 3.5 MMOL/L (ref 3.5–5.1)
PSA SERPL-MCNC: 109.3 NG/ML (ref 0–4)
RBC # BLD AUTO: 2.89 M/UL (ref 4.1–5.7)
SERVICE CMNT-IMP: ABNORMAL
SERVICE CMNT-IMP: NORMAL
SODIUM SERPL-SCNC: 138 MMOL/L (ref 136–145)
SPECIMEN EXP DATE BLD: NORMAL
STATUS OF UNIT,%ST: NORMAL
UNIT DIVISION, %UDIV: 0
WBC # BLD AUTO: 10.2 K/UL (ref 4.1–11.1)

## 2017-08-25 PROCEDURE — 74011250636 HC RX REV CODE- 250/636: Performed by: HOSPITALIST

## 2017-08-25 PROCEDURE — 74011000258 HC RX REV CODE- 258: Performed by: SURGERY

## 2017-08-25 PROCEDURE — 77030018836 HC SOL IRR NACL ICUM -A

## 2017-08-25 PROCEDURE — 74011250636 HC RX REV CODE- 250/636: Performed by: INTERNAL MEDICINE

## 2017-08-25 PROCEDURE — 94640 AIRWAY INHALATION TREATMENT: CPT

## 2017-08-25 PROCEDURE — 74011000250 HC RX REV CODE- 250: Performed by: INTERNAL MEDICINE

## 2017-08-25 PROCEDURE — 82962 GLUCOSE BLOOD TEST: CPT

## 2017-08-25 PROCEDURE — 85025 COMPLETE CBC W/AUTO DIFF WBC: CPT | Performed by: INTERNAL MEDICINE

## 2017-08-25 PROCEDURE — 77030018798 HC PMP KT ENTRL FED COVD -A

## 2017-08-25 PROCEDURE — 74011250636 HC RX REV CODE- 250/636: Performed by: SURGERY

## 2017-08-25 PROCEDURE — 36415 COLL VENOUS BLD VENIPUNCTURE: CPT | Performed by: INTERNAL MEDICINE

## 2017-08-25 PROCEDURE — 80048 BASIC METABOLIC PNL TOTAL CA: CPT | Performed by: INTERNAL MEDICINE

## 2017-08-25 PROCEDURE — 65610000006 HC RM INTENSIVE CARE

## 2017-08-25 PROCEDURE — 74011250637 HC RX REV CODE- 250/637: Performed by: INTERNAL MEDICINE

## 2017-08-25 PROCEDURE — 77010033678 HC OXYGEN DAILY

## 2017-08-25 RX ADMIN — IPRATROPIUM BROMIDE AND ALBUTEROL SULFATE 3 ML: .5; 3 SOLUTION RESPIRATORY (INHALATION) at 19:17

## 2017-08-25 RX ADMIN — ASPIRIN 81 MG 81 MG: 81 TABLET ORAL at 09:06

## 2017-08-25 RX ADMIN — HEPARIN SODIUM 5000 UNITS: 5000 INJECTION, SOLUTION INTRAVENOUS; SUBCUTANEOUS at 23:09

## 2017-08-25 RX ADMIN — MEROPENEM 500 MG: 500 INJECTION, POWDER, FOR SOLUTION INTRAVENOUS at 21:55

## 2017-08-25 RX ADMIN — Medication 10 ML: at 06:13

## 2017-08-25 RX ADMIN — MEROPENEM 500 MG: 500 INJECTION, POWDER, FOR SOLUTION INTRAVENOUS at 10:09

## 2017-08-25 RX ADMIN — IPRATROPIUM BROMIDE AND ALBUTEROL SULFATE 3 ML: .5; 3 SOLUTION RESPIRATORY (INHALATION) at 14:02

## 2017-08-25 RX ADMIN — PANTOPRAZOLE SODIUM 40 MG: 40 TABLET, DELAYED RELEASE ORAL at 06:39

## 2017-08-25 RX ADMIN — HEPARIN SODIUM 5000 UNITS: 5000 INJECTION, SOLUTION INTRAVENOUS; SUBCUTANEOUS at 06:14

## 2017-08-25 RX ADMIN — COLLAGENASE SANTYL: 250 OINTMENT TOPICAL at 08:07

## 2017-08-25 RX ADMIN — IPRATROPIUM BROMIDE AND ALBUTEROL SULFATE 3 ML: .5; 3 SOLUTION RESPIRATORY (INHALATION) at 02:47

## 2017-08-25 RX ADMIN — LEVOFLOXACIN 500 MG: 5 INJECTION, SOLUTION INTRAVENOUS at 11:01

## 2017-08-25 RX ADMIN — IPRATROPIUM BROMIDE AND ALBUTEROL SULFATE 3 ML: .5; 3 SOLUTION RESPIRATORY (INHALATION) at 08:11

## 2017-08-25 RX ADMIN — DEXTROSE MONOHYDRATE 50 ML/HR: 5 INJECTION, SOLUTION INTRAVENOUS at 06:22

## 2017-08-25 RX ADMIN — HEPARIN SODIUM 5000 UNITS: 5000 INJECTION, SOLUTION INTRAVENOUS; SUBCUTANEOUS at 15:38

## 2017-08-25 RX ADMIN — Medication 10 ML: at 13:12

## 2017-08-25 RX ADMIN — Medication 10 ML: at 21:55

## 2017-08-25 NOTE — PROGRESS NOTES
Hospitalist Progress Note  Rigo Horn MD  Office: [phone]        Date of Service:  2017  NAME:  Analia Atkins  :  11/10/1933  MRN:  [id number]      Admission Summary:   The patient is an 80-year-old patient who originally was a resident of [place], [place]. He   was admitted in [facility] on 2017 due to hypotension, hyperventilation and confusion. Since the patient was unable to be extubated while in the care in the ICU, then tracheostomy and PEG was done and the patient was transferred to a long term care facility   that was in [facility]. his morning the patient continued with shortness of breath, for which we will transfer the patient to our ER. While in our ER, the patient was connected to the ventilator through the tracheostomy. Interval history / Subjective:   Extubated to trache collar   hgb > 7     Assessment & Plan:     1. Sepsis with septic shock from indwelling gutierrez cath secondary to UTI with ESBL e coli- On merrem fluid resuscitation per protocol on levophed/ albimun MGMT per PCCM     2. Bacteremia with ESBL e coli with sepsis from above - On meropenem    3. Acute and chronic renal failure,due to hypotension from sepsis,  aggressive hydration  Nephrology consulted on HD PRN      4. Hyperkalemic, hypernatremic, dehydration - from renal failure,  improved      5. Acute hypoxic Respiratory failure - extubated to trache collar  Further management will be as per PCCM     6. Coronary artery disease - status post CABG. Symptomatic treatment at   this time in this patient.      7. Decubital ulcer -PROVIDENCIA STUARTII  Also c/s to meropenem no debridement planned per surgery     8. Anemia - possibly from chronic dz monitor s/p 1 unit BT     9.  Severe Protein Calorie Malnutrition     Code status:DNR  DVT prophylaxis:SQH    Care Plan discussed with: Patient/Family and Nurse  Disposition: TBD Poor prognosis Hospice appropriate      Hospital Problems  Never Reviewed          Codes Class Noted POA    UTI (urinary tract infection) due to urinary indwelling catheter [facility]) ICD-10-CM: T83.511A, N39.0  ICD-9-CM: 996.64, 599.0  8/22/2017 Unknown        Sepsis affecting skin ICD-10-CM: L02.91  ICD-9-CM: 682.9  8/22/2017 Unknown                Review of Systems:   Not done due to pt factor      Vital Signs:    Last 24hrs VS reviewed since prior progress note. Most recent are:  Visit Vitals    /51    Pulse 76    Temp 98.9 °F (37.2 °C)    Resp 23    Ht 6' (1.829 m)    Wt 64.7 kg (142 lb 10.2 oz)    SpO2 100%    BMI 19.35 kg/m2         Intake/Output Summary (Last 24 hours) at 08/25/17 1122  Last data filed at 08/25/17 1009   Gross per 24 hour   Intake          1505.83 ml   Output              565 ml   Net           940.83 ml        Physical Examination:             Constitutional:  Trache collar minimally responsive   ENT:  trache    Resp:  Coarse sound    CV:  Regular rhythm, normal rate    GI:  Soft, non distended,     Musculoskeletal:  No edema, warm, 2+ pulses throughout    Neurologic:  minimal response            Data Review:    I personally reviewed  Image and LABS      Labs:     Recent Labs      08/25/17   0432  08/24/17   0401   WBC  10.2  11.9*   HGB  7.9*  6.3*   HCT  25.2*  21.0*   PLT  178  203     Recent Labs      08/25/17   0432  08/24/17   0401  08/23/17   0421   08/22/17   1309   NA  138  145  143   < >   --    K  3.5  3.8  4.3   < >   --    CL  103  112*  107   < >   --    CO2  29  22  26   < >   --    BUN  29*  75*  68*   < >   --    CREA  1.91*  3.02*  2.29*   < >   --    GLU  93  70  105*   < >   --    CA  7.4*  7.5*  7.7*   < >   --    MG   --    --    --    --   2.9*   PHOS   --    --    --    --   4.9*    < > = values in this interval not displayed. No results for input(s): SGOT, GPT, ALT, AP, TBIL, TBILI, TP, ALB, GLOB, GGT, AML, LPSE in the last 72 hours.     No lab exists for component: AMYP, HLPSE  Recent Labs      08/23/17   0421   INR  1.3*   PTP  13.8*   APTT  36.3*      No results for input(s): FE, TIBC, PSAT, FERR in the last 72 hours. No results found for: FOL, RBCF   No results for input(s): PH, PCO2, PO2 in the last 72 hours.   Recent Labs      08/22/17   1309   CPK  25*     No results found for: CHOL, CHOLX, CHLST, CHOLV, HDL, LDL, LDLC, DLDLP, TGLX, TRIGL, TRIGP, CHHD, CHHDX  Lab Results   Component Value Date/Time    Glucose (POC) 98 08/25/2017 04:37 AM    Glucose (POC) 90 08/24/2017 09:10 PM    Glucose (POC) 83 08/24/2017 02:12 PM    Glucose (POC) 120 08/24/2017 05:48 AM    Glucose (POC) 77 08/24/2017 05:34 AM     Lab Results   Component Value Date/Time    Color YELLOW/STRAW 08/22/2017 12:14 PM    Appearance TURBID 08/22/2017 12:14 PM    Specific gravity 1.010 08/22/2017 12:14 PM    pH (UA) 7.0 08/22/2017 12:14 PM    Protein 100 08/22/2017 12:14 PM    Glucose NEGATIVE  08/22/2017 12:14 PM    Ketone NEGATIVE  08/22/2017 12:14 PM    Bilirubin NEGATIVE  08/22/2017 12:14 PM    Urobilinogen 0.2 08/22/2017 12:14 PM    Nitrites NEGATIVE  08/22/2017 12:14 PM    Leukocyte Esterase LARGE 08/22/2017 12:14 PM    Epithelial cells FEW 08/22/2017 12:14 PM    Bacteria 4+ 08/22/2017 12:14 PM    WBC  08/22/2017 12:14 PM    RBC 20-50 08/22/2017 12:14 PM         Medications Reviewed:     Current Facility-Administered Medications   Medication Dose Route Frequency    meropenem (MERREM) 500 mg in 0.9% sodium chloride (MBP/ADV) 50 mL  0.5 g IntraVENous Q12H    dextrose (D50W) injection syrg 25 g  25 g IntraVENous PRN    0.9% sodium chloride infusion 250 mL  250 mL IntraVENous PRN    levoFLOXacin (LEVAQUIN) 500 mg in D5W IVPB  500 mg IntraVENous Q48H    dextrose 5% infusion  50 mL/hr IntraVENous CONTINUOUS    fentaNYL citrate (PF) injection 25 mcg  25 mcg IntraVENous Q1H PRN    gelatin adsorbable (GELFOAM) 12-7 mm sponge   Topical PRN    sodium chloride (NS) flush 5-10 mL  5-10 mL IntraVENous Q8H    sodium chloride (NS) flush 5-10 mL  5-10 mL IntraVENous PRN    acetaminophen (TYLENOL) tablet 650 mg  650 mg Oral Q4H PRN    HYDROcodone-acetaminophen (NORCO) 5-325 mg per tablet 1 Tab  1 Tab Oral Q4H PRN    ondansetron (ZOFRAN ODT) tablet 4 mg  4 mg Oral Q4H PRN    heparin (porcine) injection 5,000 Units  5,000 Units SubCUTAneous Q8H    acetaminophen (TYLENOL) suppository 650 mg  650 mg Rectal Q4H PRN    albuterol-ipratropium (DUO-NEB) 2.5 MG-0.5 MG/3 ML  3 mL Nebulization Q6H RT    aspirin chewable tablet 81 mg  81 mg Per G Tube DAILY    NOREPINephrine (LEVOPHED) 8,000 mcg in dextrose 5% 250 mL infusion  2-16 mcg/min IntraVENous TITRATE    pantoprazole (PROTONIX) 2 mg/mL oral suspension 40 mg  40 mg PEG Tube ACB    collagenase (SANTYL) 250 unit/gram ointment   Topical DAILY     ______________________________________________________________________  EXPECTED LENGTH OF STAY: 4d 21h  ACTUAL LENGTH OF STAY:          3                 Moris Luo MD

## 2017-08-25 NOTE — PROGRESS NOTES
0800 Bedside and Verbal shift change report given to SIERRA Douglas RN (oncoming nurse) by Rebel Fox RN and Lexi Brizuela RN (offgoing nurse). Report included the following information SBAR, Kardex, ED Summary, Procedure Summary, Intake/Output, MAR, Accordion, Recent Results and Alarm Parameters . Problem: Falls - Risk of  Goal: *Absence of Falls  Document James Fall Risk and appropriate interventions in the flowsheet. Outcome: Progressing Towards Goal  Fall Risk Interventions:     Mentation Interventions: Adequate sleep, hydration, pain control, Evaluate medications/consider consulting pharmacy, Familiar objects from home, More frequent rounding, Reorient patient  Medication Interventions: Bed/chair exit alarm, Evaluate medications/consider consulting pharmacy, Patient to call before getting OOB, Teach patient to arise slowly  Elimination Interventions: Bed/chair exit alarm, Call light in reach, Patient to call for help with toileting needs       Problem: Pressure Injury - Risk of  Goal: *Prevention of pressure ulcer  Q2hr turning, elaine prominences with foam patches. Pressure ulcer wet to dry dressing with Santyl c/d/i.    2000 Bedside and Verbal shift change report given to DESMOND Smith RN (oncoming nurse) by Jarvis Toney RN (offgoing nurse). Report included the following information SBAR, Kardex, ED Summary, Procedure Summary, Intake/Output, MAR, Accordion and Recent Results. SHIFT SUMMARY: Uneventful. Appears more alert today. No family present during the day. Care decisions still pending. Tube feedings started.  SHANTANU CAM.

## 2017-08-25 NOTE — PROGRESS NOTES
Bedside and Verbal shift change report given to TRUDY Norris RN / ANABEL Carter RN  (oncoming nurse) by NADIA Douglas RN (offgoing nurse). Report included the following information SBAR, Kardex, MAR, Recent Results and Cardiac Rhythm NSR.    2000: Received patient. Patient opens eyes to voice and stimulus. Follows commands, moves all extremities, pupils equal and reactive. Lungs clear, VSS, trach collar 10 L with FIO2 30%. 0000: No changes to assessment. 0400: No changes to assessment. Shift Summary: Uneventful shift. Patient's eyes open to voice and stimulus, follows commands, EMERSON, pupils equal and reactive, lungs clear with trach collar at 10 L with FIO2 at 30%. VSS,  ml throughout shift. Has thick mucous secretions coughed up from trach. Spot check of blood sugars were between 90 - 98, no insulin given. Bedside and Verbal shift change report given to NADIA Douglas RN (oncoming nurse) by Law Norris RN (offgoing nurse). Report included the following information SBAR, Kardex, MAR, Recent Results and Cardiac Rhythm NSR.

## 2017-08-25 NOTE — PROGRESS NOTES
RENAL  PROGRESS NOTE        Subjective:    COMPLAINT:extubated ,non verbal;had HD yesterday  Objective:   VITALS SIGNS:    Visit Vitals    /58 (BP 1 Location: Left arm, BP Patient Position: At rest)    Pulse 82    Temp 98.9 °F (37.2 °C)    Resp 16    Ht 6' (1.829 m)    Wt 64.7 kg (142 lb 10.2 oz)    SpO2 99%    BMI 19.35 kg/m2       O2 Device: Tracheal collar   O2 Flow Rate (L/min): 10 l/min   Temp (24hrs), Av.2 °F (36.8 °C), Min:97.8 °F (36.6 °C), Max:98.9 °F (37.2 °C)         PHYSICAL EXAM:  No edema  Open ehes  abd soft    DATA REVIEW:     INTAKE / OUTPUT:   Last shift:       07 - 1900  In: 50 [I.V.:50]  Out: 35 [Urine:35]  Last 3 shifts:  190 -  0700  In: 2753.8 [I.V.:2383.8]  Out: 781 [Urine:772]    Intake/Output Summary (Last 24 hours) at 17 0920  Last data filed at 17 0800   Gross per 24 hour   Intake          1355.83 ml   Output              520 ml   Net           835.83 ml         LABS:   Recent Labs      17   0432  17   0401  17   0421   WBC  10.2  11.9*  21.3*   HGB  7.9*  6.3*  7.7*   HCT  25.2*  21.0*  25.4*   PLT  178  203  293     Recent Labs      17   0432  17   0401  17   0421   17   1309   17   1000   NA  138  145  143   < >   --    < >  155*   K  3.5  3.8  4.3   < >   --    < >  8.6*   CL  103  112*  107   < >   --    < >  123*   CO2  29  22  26   < >   --    < >  26   GLU  93  70  105*   < >   --    < >  137*   BUN  29*  75*  68*   < >   --    < >  181*   CREA  1.91*  3.02*  2.29*   < >   --    < >  4.71*   CA  7.4*  7.5*  7.7*   < >   --    < >  8.9   MG   --    --    --    --   2.9*   --    --    PHOS   --    --    --    --   4.9*   --    --    ALB   --    --    --    --    --    --   1.7*   TBILI   --    --    --    --    --    --   0.5   SGOT   --    --    --    --    --    --   893*   ALT   --    --    --    --    --    --   763*   INR   --    --   1.3*   --    --    --    --     < > = values in this interval not displayed. Assessment:   FREDERICK,? Baseline likely septic ATN/hypotension  with chronic gutierrez,very high BUN;s.p urgent dialysis on 8.22.17;another trt on 8.24.17  kimberly on 8.22.17  CKD ?  Baseline  prostate CA as per wife  Severe hyperkalemia,s.p   urgent dialysis    Septic shock;gram neg rods bacteremia likely from urospesis    VDRF  Cachexia  Anemia as per first team  Plan:      AB  recom hospice   Had a previous long talk with wife,he will be a poor longterm dialysis candidate  recom   hospice  Will call wife again       Main Beasley MD

## 2017-08-25 NOTE — PROGRESS NOTES
Several attempts made to visit and offer support to pt's wife; wife does visit but has not been present when this  attempted to visit. Please page [phone] as needed for pt and family support.     Machelle Piña, Palliative

## 2017-08-25 NOTE — PROGRESS NOTES
Intensivist / Pulmonary / Critical Care  Assessment / Plan:      · Sepsis- likely from GNR UTI- has GNR bacteremia- E coli - ESBL   · Large sacral decub- eval'd by surgery and not felt to be source of sepsis. · Chronic trach with Acute VDRF- CXR clear. Basilar ATX. · FREDERICK on CKD  · Hyperkalemia  · Prostate ca  · H/o CABG  · H/o PEA arrest  · Chronic trach    --abx  --pulm toilet  --TC  --supportive care    Coverage to see over the weekend if needed    History / Subjective:  Hospital Day: 4 - Interval history and notes reviewed     Came off vent to trach collar yesterday without difficulty. Plans to stop dialysis noted  Palliative care following  He is a partial code. Requires suctioning every 2 hours    Objective:  Patient Vitals for the past 4 hrs:   BP Temp Pulse Resp SpO2   17 0807 - - - - 99 %   17 0800 119/58 98.9 °F (37.2 °C) 82 16 100 %   17 0700 127/62 - 81 30 100 %   Temp (24hrs), Av.2 °F (36.8 °C), Min:97.8 °F (36.6 °C), Max:98.9 °F (37.2 °C)    Intake/Output Summary (Last 24 hours) at 17 1002  Last data filed at 17 0800   Gross per 24 hour   Intake          1355.83 ml   Output              520 ml   Net           835.83 ml     Lab Results   Component Value Date/Time    Glucose (POC) 98 2017 04:37 AM    Glucose (POC) 90 2017 09:10 PM    Glucose (POC) 83 2017 02:12 PM    Glucose (POC) 120 2017 05:48 AM    Glucose (POC) 77 2017 05:34 AM     Exam:  No distress  On trach collar  Data:   Interval lab and diagnostic data was reviewed. Interval radiology images were independently viewed and available reports were reviewed.        Lab:  Recent Labs      17   0432  17   0401  17   1005  17   0421   17   1309   WBC  10.2  11.9*   --   21.3*   --    --    HGB  7.9*  6.3*   --   7.7*   --    --    PLT  178  203   --   293   --    --    NA  138  145   --   143   < >   --    K  3.5  3.8   --   4.3   < >   --    CL  103  112* --   107   < >   --    CO2  29  22   --   26   < >   --    BUN  29*  75*   --   68*   < >   --    CREA  1.91*  3.02*   --   2.29*   < >   --    GLU  93  70   --   105*   < >   --    CA  7.4*  7.5*   --   7.7*   < >   --    MG   --    --    --    --    --   2.9*   PHOS   --    --    --    --    --   4.9*   INR   --    --    --   1.3*   --    --    CPK   --    --    --    --    --   25*   LAC   --    --   1.0   --    --    --     < > = values in this interval not displayed.        Rhunette Osler, MD

## 2017-08-25 NOTE — CDMP QUERY
Please clarify if this patient is being treated/managed for:    =>Severe Protein Calorie Malnutrition in the setting of sepsis treated with Nutrition consult  =>Other Explanation of clinical findings  =>Unable to Determine (no explanation of clinical findings)    The medical record reflects the following clinical findings, treatment, and risk factors:    Risk Factors: sepsis    Clinical Indicators: 8/23 DIET PN:    Meets Criteria for Chronic Malnutrition   Severe Malnutrition, as evidenced by:                         Severe muscle wasting                          Severe loss of subcutaneous fat  No wt hx available but only 76% IBW, with temporal and clavicular wasting. Meets criteria for malnutrition  1. Inadequate oral intake related to respiratory failure as evidenced by trach with PEG PTA   2.  (Increased energy needs) related to underweight, malnutrition as evidenced by 76% IBW, cachexia   3. Altered nutrition-related lab values (Decreased protein/Phos/K+ needs) related to FREDERICK on CKD as evidenced by emergent HD, elevated lab values    Treatment: Nutrition Consult    Please clarify and document your clinical opinion in the progress notes and discharge summary including the definitive and/or presumptive diagnosis, (suspected or probable), related to the above clinical findings. Please include clinical findings supporting your diagnosis.     Thank Isaiah Notice [facility]  [phone]

## 2017-08-25 NOTE — PROGRESS NOTES
Surgery Progress Note    Admit Date: 8/22/2017      Subjective:   Being evaluated for hospice        Objective:     Patient Vitals for the past 8 hrs:   BP Temp Pulse Resp SpO2   08/25/17 1200 90/54 97.9 °F (36.6 °C) 75 22 100 %   08/25/17 1100 102/57 - 79 16 100 %   08/25/17 1000 109/51 - 76 23 100 %   08/25/17 0900 104/52 - 77 17 100 %   08/25/17 0807 - - - - 99 %   08/25/17 0800 119/58 98.9 °F (37.2 °C) 82 16 100 %   08/25/17 0700 127/62 - 81 30 100 %   08/25/17 0600 104/53 - 67 25 100 %   08/25/17 0500 103/55 - 69 23 100 %     08/25 0701 - 08/25 1900  In: 300 [I.V.:300]  Out: 100 [Urine:100]  08/23 1901 - 08/25 0700  In: 2753.8 [I.V.:2383.8]  Out: 285 [Urine:772]ip  Physical Exam:    Wound- not assess. Patient resting. CBC: Lab Results   Component Value Date/Time    WBC 10.2 08/25/2017 04:32 AM    RBC 2.89 08/25/2017 04:32 AM    HGB 7.9 08/25/2017 04:32 AM    HCT 25.2 08/25/2017 04:32 AM    PLATELET 560 65/83/5517 04:32 AM     BMP: Lab Results   Component Value Date/Time    Glucose 93 08/25/2017 04:32 AM    Sodium 138 08/25/2017 04:32 AM    Potassium 3.5 08/25/2017 04:32 AM    Chloride 103 08/25/2017 04:32 AM    CO2 29 08/25/2017 04:32 AM    BUN 29 08/25/2017 04:32 AM    Creatinine 1.91 08/25/2017 04:32 AM    Calcium 7.4 08/25/2017 04:32 AM     CMP:  Lab Results   Component Value Date/Time    Glucose 93 08/25/2017 04:32 AM    Sodium 138 08/25/2017 04:32 AM    Potassium 3.5 08/25/2017 04:32 AM    Chloride 103 08/25/2017 04:32 AM    CO2 29 08/25/2017 04:32 AM    BUN 29 08/25/2017 04:32 AM    Creatinine 1.91 08/25/2017 04:32 AM    Calcium 7.4 08/25/2017 04:32 AM    Anion gap 6 08/25/2017 04:32 AM    BUN/Creatinine ratio 15 08/25/2017 04:32 AM    Alk.  phosphatase 333 08/22/2017 10:00 AM    Protein, total 8.5 08/22/2017 10:00 AM    Albumin 1.7 08/22/2017 10:00 AM    Globulin 6.8 08/22/2017 10:00 AM    A-G Ratio 0.3 08/22/2017 10:00 AM               Assessment:     Patient Active Problem List   Diagnosis Code  UTI (urinary tract infection) due to urinary indwelling catheter ([facility]) T83.511A, N39.0    Sepsis affecting skin L02.91         Plan:   Sacral decub- continue dressing changeds  Further plan per Dr. Rajan Pérez  \

## 2017-08-25 NOTE — PROGRESS NOTES
I called his wife today and we had a long discussion about his medical issues including his renal failure;i told her he will be a poor candidate for long term dialysis;she said she will stop by today to see him but she is not ready yet  to make a decision about stopping dialysis . ;will follow

## 2017-08-25 NOTE — PROGRESS NOTES
Bedside and Verbal shift change report given to TRUDY Norris RN / ANABEL Silvestre, SUNITHA (oncoming nurse) by NADIA Douglas RN (offgoing nurse). Report included the following information SBAR, Kardex, Intake/Output, MAR and Cardiac Rhythm NSR.     1930: Received patient. Patient alert, following commands, EMERSON, pupils equal and reactive, lungs clear, VSS. Trach collar 10 L with FIO2 30%. Dark rob urine in gutierrez. Wife at bedside. 2200: Tube feed rate increased to 35 ml from 20 ml, patient tolerating well. 0000: No changes to assessment. 0400: No changes to assessment. Tube feed rate increased to 50 ml from 20 ml, patient tolerating well, residual of 0 ml. Shift Summary: Uneventful shift. Patient alert, follows commands, EMERSON, pupils equal and reactive, VSS. Lungs clear with trach collar at 10 L with FIO2 at 30%.  ml throughout shift that is dark rob. Thick mucous secretions from trach. Tube feedings at goal of 50 ml, tolerating well. SHANTANU CAM    Bedside and Verbal shift change report given to NADIA Douglas RN (oncoming nurse) by Tyshawn Patterson. SUNITHA Norris / ANABEL Silvestre, SUNITHA (offgoing nurse). Report included the following information SBAR, Kardex, Intake/Output, MAR, Recent Results and Cardiac Rhythm NSR.

## 2017-08-25 NOTE — PROGRESS NOTES
NUTRITION brief    Recommendations:   1. Resume tube feeds with renal formula for now:                   - Suplena @ 50ml/hr + 160ml q3hr (or adjusted per renal)  **Once renal status improved/stable switch back to Glucerna - see recs below**  2. Monitor K+ and Phos   3. Daily weights       Diet: NPO  Medications:  levaquin, merrem, protonix, D5 @ 15ml/hr, levophed     MD consult for tube feeds received. Had not been started at time of previous assessment with pt care plans still being discussed. Extubated to trach collar. Nephrology recommending Hospice care - plan per wife pending but will be discharged back to SNF when appropriate with or without Hospice per CM notes. K+ and Phos now WNL but renal function still poor. Received second HD treatment yesterday. Pt on Glucerna PTA but with decreased renal fx would like to avoid excessive protein intake. For now recommend start with Suplena (renal- low protein) with goal of 50ml/hr + 160ml water flish q3hr [1200ml, 2160kcal, 54g protein, 1366mg K+, 860mg Phos, 885ml fluid + 1280ml flush = 2165ml fluid.]     Once kidney function improved and stable would switch back to Glucerna for higher protein to aid in wound healing (see previous note for recommendations if/when needed). Will continue to follow for TF tolerance, renal fx, wt trends, and plan of care.   Estimated Nutrition Needs:   Kcals/day: 2170 Kcals/day (2170-2480kcal)  Protein: 50 g (50g (0.8g/kg) w/ FREDERICK (100-111g (1.6-1.8g/kg) once resolved))  Fluid: 2170 ml (1ml/kcal or per renal)  Based On: Kcal/kg - specify (Comment) (35-40kcal/kg)  Weight Used: Actual wt (62kg)    Christine Preciado RD

## 2017-08-25 NOTE — PROGRESS NOTES
CM talked to pt's wife, Noris Carroll, [phone]), this am in reference to her preference for SNF in her area. She has no specific preference except not to include [facility]. CM discussed if pt will have hospice. She asked about difference in pt going with or without hospice. CM explained management of care from hospice at the facility. Referrals sent via [employer] to several SNF(s) in [address] [place] [address]. Will follow for responses. Jose Canas RN  ACM [facility] pt. Pt has Medicare and Medicaid that will cover the facility and Hospice. t's wife was informed that [facility] accept. This will be for LTC. [facility] requested the following information:  Trach size, type of tube feeding, wound care documentation, any isolation and amount of O2. Above information faxed to Gerardo Vyas in admission office, [phone].

## 2017-08-26 LAB
ANION GAP SERPL CALC-SCNC: 9 MMOL/L (ref 5–15)
BUN SERPL-MCNC: 36 MG/DL (ref 6–20)
BUN/CREAT SERPL: 14 (ref 12–20)
CALCIUM SERPL-MCNC: 7.2 MG/DL (ref 8.5–10.1)
CHLORIDE SERPL-SCNC: 99 MMOL/L (ref 97–108)
CO2 SERPL-SCNC: 27 MMOL/L (ref 21–32)
CREAT SERPL-MCNC: 2.64 MG/DL (ref 0.7–1.3)
ERYTHROCYTE [DISTWIDTH] IN BLOOD BY AUTOMATED COUNT: 16 % (ref 11.5–14.5)
GLUCOSE SERPL-MCNC: 128 MG/DL (ref 65–100)
HCT VFR BLD AUTO: 25.7 % (ref 36.6–50.3)
HGB BLD-MCNC: 8 G/DL (ref 12.1–17)
MCH RBC QN AUTO: 26.6 PG (ref 26–34)
MCHC RBC AUTO-ENTMCNC: 31.1 G/DL (ref 30–36.5)
MCV RBC AUTO: 85.4 FL (ref 80–99)
PLATELET # BLD AUTO: 193 K/UL (ref 150–400)
POTASSIUM SERPL-SCNC: 3 MMOL/L (ref 3.5–5.1)
RBC # BLD AUTO: 3.01 M/UL (ref 4.1–5.7)
SODIUM SERPL-SCNC: 135 MMOL/L (ref 136–145)
WBC # BLD AUTO: 10.2 K/UL (ref 4.1–11.1)

## 2017-08-26 PROCEDURE — 65610000006 HC RM INTENSIVE CARE

## 2017-08-26 PROCEDURE — 77030021668 HC NEB PREFIL KT VYRM -A

## 2017-08-26 PROCEDURE — 74011250637 HC RX REV CODE- 250/637: Performed by: INTERNAL MEDICINE

## 2017-08-26 PROCEDURE — 74011250636 HC RX REV CODE- 250/636: Performed by: HOSPITALIST

## 2017-08-26 PROCEDURE — 36415 COLL VENOUS BLD VENIPUNCTURE: CPT | Performed by: HOSPITALIST

## 2017-08-26 PROCEDURE — 74011000250 HC RX REV CODE- 250: Performed by: INTERNAL MEDICINE

## 2017-08-26 PROCEDURE — 74011000250 HC RX REV CODE- 250

## 2017-08-26 PROCEDURE — 74011250636 HC RX REV CODE- 250/636: Performed by: SURGERY

## 2017-08-26 PROCEDURE — 94640 AIRWAY INHALATION TREATMENT: CPT

## 2017-08-26 PROCEDURE — 74011250636 HC RX REV CODE- 250/636: Performed by: INTERNAL MEDICINE

## 2017-08-26 PROCEDURE — 74011000258 HC RX REV CODE- 258: Performed by: SURGERY

## 2017-08-26 PROCEDURE — 77010033678 HC OXYGEN DAILY

## 2017-08-26 PROCEDURE — 85027 COMPLETE CBC AUTOMATED: CPT | Performed by: HOSPITALIST

## 2017-08-26 PROCEDURE — 80048 BASIC METABOLIC PNL TOTAL CA: CPT | Performed by: HOSPITALIST

## 2017-08-26 RX ORDER — IPRATROPIUM BROMIDE AND ALBUTEROL SULFATE 2.5; .5 MG/3ML; MG/3ML
SOLUTION RESPIRATORY (INHALATION)
Status: COMPLETED
Start: 2017-08-26 | End: 2017-08-26

## 2017-08-26 RX ADMIN — HEPARIN SODIUM 5000 UNITS: 5000 INJECTION, SOLUTION INTRAVENOUS; SUBCUTANEOUS at 15:05

## 2017-08-26 RX ADMIN — PANTOPRAZOLE SODIUM 40 MG: 40 TABLET, DELAYED RELEASE ORAL at 06:32

## 2017-08-26 RX ADMIN — IPRATROPIUM BROMIDE AND ALBUTEROL SULFATE 3 ML: .5; 3 SOLUTION RESPIRATORY (INHALATION) at 16:34

## 2017-08-26 RX ADMIN — HEPARIN SODIUM 5000 UNITS: 5000 INJECTION, SOLUTION INTRAVENOUS; SUBCUTANEOUS at 06:32

## 2017-08-26 RX ADMIN — COLLAGENASE SANTYL: 250 OINTMENT TOPICAL at 08:34

## 2017-08-26 RX ADMIN — IPRATROPIUM BROMIDE AND ALBUTEROL SULFATE 3 ML: .5; 3 SOLUTION RESPIRATORY (INHALATION) at 02:02

## 2017-08-26 RX ADMIN — MEROPENEM 500 MG: 500 INJECTION, POWDER, FOR SOLUTION INTRAVENOUS at 09:56

## 2017-08-26 RX ADMIN — IPRATROPIUM BROMIDE AND ALBUTEROL SULFATE 3 ML: .5; 3 SOLUTION RESPIRATORY (INHALATION) at 08:47

## 2017-08-26 RX ADMIN — Medication 10 ML: at 06:02

## 2017-08-26 RX ADMIN — MEROPENEM 500 MG: 500 INJECTION, POWDER, FOR SOLUTION INTRAVENOUS at 22:41

## 2017-08-26 RX ADMIN — ASPIRIN 81 MG 81 MG: 81 TABLET ORAL at 08:34

## 2017-08-26 RX ADMIN — IPRATROPIUM BROMIDE AND ALBUTEROL SULFATE 3 ML: .5; 3 SOLUTION RESPIRATORY (INHALATION) at 19:35

## 2017-08-26 RX ADMIN — Medication 10 ML: at 14:34

## 2017-08-26 RX ADMIN — DEXTROSE MONOHYDRATE 50 ML/HR: 5 INJECTION, SOLUTION INTRAVENOUS at 02:23

## 2017-08-26 RX ADMIN — HEPARIN SODIUM 5000 UNITS: 5000 INJECTION, SOLUTION INTRAVENOUS; SUBCUTANEOUS at 22:41

## 2017-08-26 RX ADMIN — Medication 10 ML: at 22:42

## 2017-08-26 NOTE — PROGRESS NOTES
Problem: Falls - Risk of  Goal: *Absence of Falls  Document James Fall Risk and appropriate interventions in the flowsheet.    Outcome: Progressing Towards Goal  Fall Risk Interventions:     Mentation Interventions: Adequate sleep, hydration, pain control, Reorient patient, More frequent rounding, Update white board  Medication Interventions: Patient to call before getting OOB, Evaluate medications/consider consulting pharmacy  Elimination Interventions: Call light in reach, Patient to call for help with toileting needs       Problem: Nutrition Deficit  Goal: *Optimize nutritional status  Outcome: Progressing Towards Goal  Tube feedings infusing at goal.

## 2017-08-26 NOTE — ROUTINE PROCESS
0800 Bedside and Verbal shift change report given to SIERRA Douglas RN (oncoming nurse) by Orlin Ahumada RN, DESMOND Norris RN (offgoing nurse). Report included the following information SBAR, Kardex, ED Summary, Procedure Summary, Intake/Output, MAR, Accordion and Recent Results. 2000 Bedside and Verbal shift change report given to [facility] Jenni (oncoming nurse) by Matt Dumont RN (offgoing nurse). Report included the following information SBAR, Kardex, ED Summary, Procedure Summary, Intake/Output, MAR, Accordion and Recent Results.

## 2017-08-26 NOTE — PROGRESS NOTES
Responded to page from pt's RN for support for pt's wife in [address] [place] [address]. Offered support to pt's wife as pt's wife struggled with a potential decision concerning change in LOC. Offered support to pt's wife as she offered life review and shared concerns around the decision. Offered pastoral presence through active listening and prayer. Affirmed ongoing availability of support. Blas Almanzar MDiv.  Staff   Please call [phone] to page  if needed

## 2017-08-26 NOTE — PROGRESS NOTES
0800 Received report, assumed care. Patient is alert, eyes open spontaneously, following commands and nodding appropriately. Wife Alveria Downer at the bedside, verbalized concerns about pt's prognosis, support provided. Incontinence care, trach care complete. Rizo draining rob/red urine. VSS. 1000 Patient's wife verbalized to nurse leader making rounds that she has no support system to process care decisions. Pastoral care paged. 1100 Dr Yasmin Brice at bedside speaking with wife. 1600 Nephrology at bedside speaking with wife. 1800 Patient's wife tearful, states patient is hungry and trying to eat hospital equipment. Provided education related to patient's tube feedings/water flush. Pastoral care paged. 1810 Pastoral care at the bedside. SHIFT SUMMARY: Care decisions still pending. Trach collar 30% FiO2 10L, coughing up yellow/green secretions. Nodding at times, unsure patient's understanding of questions. Follows commands. BMx2. MIVF discontinued. Rizo output low, rob with sediment. Levophed gtt remains off. SHANTANU CAM, RASS 0.

## 2017-08-26 NOTE — PROGRESS NOTES
Hospitalist Progress Note  Lolly Garcia MD  Office: [phone]        Date of Service:  2017  NAME:  Chato Perdomo  :  11/10/1933  MRN:  [id number]      Admission Summary:   The patient is an 59-year-old patient who originally was a resident of [place], [place]. He   was admitted in [facility] on 2017 due to hypotension, hyperventilation and confusion. Since the patient was unable to be extubated while in the care in the ICU, then tracheostomy and PEG was done and the patient was transferred to a long term care facility   that was in [place]. his morning the patient continued with shortness of breath, for which we will transfer the patient to our ER. While in our ER, the patient was connected to the ventilator through the tracheostomy. Interval history / Subjective:   No overnight events noted. Discussed with wife at bedside: she states that patient improved with dialysis and last night she asked him if he wanted to continue dialysis and he nodded yes. She does not feel ready to make the decision to stop dialysis, \"he got better with dialysis, it would be like killing him\". Assessment & Plan:     1. Sepsis with septic shock from indwelling gutierrez cath secondary to UTI with ESBL e coli- On merrem fluid resuscitation per protocol on levophed/ albimun MGMT per PCCM     2. Bacteremia with ESBL e coli with sepsis from above - On meropenem    3. Acute and chronic renal failure,due to hypotension from sepsis,  aggressive hydration  Nephrology consulted on HD PRN      4. Hyperkalemic, hypernatremic, dehydration - from renal failure,  improved      5. Acute hypoxic Respiratory failure - extubated to trache collar  Further management will be as per PCCM     6. Coronary artery disease - status post CABG.   Symptomatic treatment at   this time in this patient.      7. Decubital ulcer -PROVIDENCIA STUARTII Also c/s to meropenem no debridement planned per surgery     8. Anemia - possibly from chronic dz monitor s/p 1 unit BT     9. Severe Protein Calorie Malnutrition     Code status:DNR  DVT prophylaxis:H    Care Plan discussed with: Patient/Family and Nurse  Disposition: TBD   Poor prognosis Hospice appropriate     Wife does not want to stop dialysis. May be a candidate for LTAC placement if continues to require dialysis at discharge. Hospital Problems  Never Reviewed          Codes Class Noted POA    UTI (urinary tract infection) due to urinary indwelling catheter [facility]) ICD-10-CM: T83.511A, N39.0  ICD-9-CM: 996.64, 599.0  8/22/2017 Unknown        Sepsis affecting skin ICD-10-CM: L02.91  ICD-9-CM: 682.9  8/22/2017 Unknown                Review of Systems:   Not done due to pt factor      Vital Signs:    Last 24hrs VS reviewed since prior progress note. Most recent are:  Visit Vitals    /59    Pulse 94    Temp 97.9 °F (36.6 °C)    Resp 17    Ht 6' (1.829 m)    Wt 65.4 kg (144 lb 2.9 oz)    SpO2 100%    BMI 19.55 kg/m2         Intake/Output Summary (Last 24 hours) at 08/26/17 1637  Last data filed at 08/26/17 1500   Gross per 24 hour   Intake             2670 ml   Output              625 ml   Net             2045 ml        Physical Examination:             Constitutional:  Trache collar, awake, alert and followed commands appropriately by squeezing my hands. ENT:  trache    Resp:  Coarse sound    CV:  Regular rhythm, normal rate    GI:  Soft, non distended,     Musculoskeletal:  No edema, warm;    Neurologic:  awake, extremely weak.             Data Review:    I personally reviewed  Image and LABS      Labs:     Recent Labs      08/26/17   0404  08/25/17   0432   WBC  10.2  10.2   HGB  8.0*  7.9*   HCT  25.7*  25.2*   PLT  193  178     Recent Labs      08/26/17   0404  08/25/17   0432  08/24/17   0401   NA  135*  138  145   K  3.0*  3.5  3.8   CL  99  103  112*   CO2  27  29  22   BUN  36* 29*  75*   CREA  2.64*  1.91*  3.02*   GLU  128*  93  70   CA  7.2*  7.4*  7.5*     No results for input(s): SGOT, GPT, ALT, AP, TBIL, TBILI, TP, ALB, GLOB, GGT, AML, LPSE in the last 72 hours. No lab exists for component: AMYP, HLPSE  No results for input(s): INR, PTP, APTT in the last 72 hours. No lab exists for component: INREXT, INREXT   No results for input(s): FE, TIBC, PSAT, FERR in the last 72 hours. No results found for: FOL, RBCF   No results for input(s): PH, PCO2, PO2 in the last 72 hours. No results for input(s): CPK, CKNDX, TROIQ in the last 72 hours.     No lab exists for component: CPKMB  No results found for: CHOL, CHOLX, CHLST, CHOLV, HDL, LDL, LDLC, DLDLP, TGLX, TRIGL, TRIGP, CHHD, CHHDX  Lab Results   Component Value Date/Time    Glucose (POC) 98 08/25/2017 04:37 AM    Glucose (POC) 90 08/24/2017 09:10 PM    Glucose (POC) 83 08/24/2017 02:12 PM    Glucose (POC) 120 08/24/2017 05:48 AM    Glucose (POC) 77 08/24/2017 05:34 AM     Lab Results   Component Value Date/Time    Color YELLOW/STRAW 08/22/2017 12:14 PM    Appearance TURBID 08/22/2017 12:14 PM    Specific gravity 1.010 08/22/2017 12:14 PM    pH (UA) 7.0 08/22/2017 12:14 PM    Protein 100 08/22/2017 12:14 PM    Glucose NEGATIVE  08/22/2017 12:14 PM    Ketone NEGATIVE  08/22/2017 12:14 PM    Bilirubin NEGATIVE  08/22/2017 12:14 PM    Urobilinogen 0.2 08/22/2017 12:14 PM    Nitrites NEGATIVE  08/22/2017 12:14 PM    Leukocyte Esterase LARGE 08/22/2017 12:14 PM    Epithelial cells FEW 08/22/2017 12:14 PM    Bacteria 4+ 08/22/2017 12:14 PM    WBC  08/22/2017 12:14 PM    RBC 20-50 08/22/2017 12:14 PM         Medications Reviewed:     Current Facility-Administered Medications   Medication Dose Route Frequency    meropenem (MERREM) 500 mg in 0.9% sodium chloride (MBP/ADV) 50 mL  0.5 g IntraVENous Q12H    dextrose (D50W) injection syrg 25 g  25 g IntraVENous PRN    0.9% sodium chloride infusion 250 mL  250 mL IntraVENous PRN    fentaNYL citrate (PF) injection 25 mcg  25 mcg IntraVENous Q1H PRN    gelatin adsorbable (GELFOAM) 12-7 mm sponge   Topical PRN    sodium chloride (NS) flush 5-10 mL  5-10 mL IntraVENous Q8H    sodium chloride (NS) flush 5-10 mL  5-10 mL IntraVENous PRN    acetaminophen (TYLENOL) tablet 650 mg  650 mg Oral Q4H PRN    HYDROcodone-acetaminophen (NORCO) 5-325 mg per tablet 1 Tab  1 Tab Oral Q4H PRN    ondansetron (ZOFRAN ODT) tablet 4 mg  4 mg Oral Q4H PRN    heparin (porcine) injection 5,000 Units  5,000 Units SubCUTAneous Q8H    acetaminophen (TYLENOL) suppository 650 mg  650 mg Rectal Q4H PRN    albuterol-ipratropium (DUO-NEB) 2.5 MG-0.5 MG/3 ML  3 mL Nebulization Q6H RT    aspirin chewable tablet 81 mg  81 mg Per G Tube DAILY    NOREPINephrine (LEVOPHED) 8,000 mcg in dextrose 5% 250 mL infusion  2-16 mcg/min IntraVENous TITRATE    pantoprazole (PROTONIX) 2 mg/mL oral suspension 40 mg  40 mg PEG Tube ACB    collagenase (SANTYL) 250 unit/gram ointment   Topical DAILY     ______________________________________________________________________  EXPECTED LENGTH OF STAY: 4d 21h  ACTUAL LENGTH OF STAY:          4                 Morenita Rachel MD

## 2017-08-26 NOTE — PROGRESS NOTES
RENAL  PROGRESS NOTE        Subjective:    COMPLAINT:extubated ,non verbal  Objective:   VITALS SIGNS:    Visit Vitals    /62    Pulse 94    Temp 99 °F (37.2 °C)    Resp 27    Ht 6' (1.829 m)    Wt 65.4 kg (144 lb 2.9 oz)    SpO2 99%    BMI 19.55 kg/m2       O2 Device: Tracheal collar   O2 Flow Rate (L/min): 10 l/min   Temp (24hrs), Av.4 °F (36.9 °C), Min:97.9 °F (36.6 °C), Max:99 °F (37.2 °C)         PHYSICAL EXAM:  No edema  Open eyes  abd soft    DATA REVIEW:     INTAKE / OUTPUT:   Last shift:         Last 3 shifts: 1901 -  07  In: 2540 [I.V.:1900]  Out: 815 [Urine:815]    Intake/Output Summary (Last 24 hours) at 17 0747  Last data filed at 17 0700   Gross per 24 hour   Intake             1940 ml   Output              550 ml   Net             1390 ml         LABS:   Recent Labs      17   0404  17   0432  17   0401   WBC  10.2  10.2  11.9*   HGB  8.0*  7.9*  6.3*   HCT  25.7*  25.2*  21.0*   PLT  193  178  203     Recent Labs      17   0404  17   0432  17   0401   NA  135*  138  145   K  3.0*  3.5  3.8   CL  99  103  112*   CO2  27  29  22   GLU  128*  93  70   BUN  36*  29*  75*   CREA  2.64*  1.91*  3.02*   CA  7.2*  7.4*  7.5*           Assessment:   FREDERICK, likely septic ATN/hypotension with chronic gutierrez, s.p urgent dialysis on 17 and again on 17  kimberly on 17  CKD ? Baseline  prostate CA as per wife  Severe hyperkalemia, resolved s.p urgent dialysis  Septic shock;gram neg rods bacteremia likely from urospesis  VDRF  Cachexia  Anemia as per first team  Plan:        recommend hospice   Dr. Joslyn Leyden has had talks with wife re: he will be a poor longterm dialysis candidate. She is considering. No HD required today.         Keila Lopez MD

## 2017-08-26 NOTE — PROGRESS NOTES
Problem: Falls - Risk of  Goal: *Absence of Falls  Document James Fall Risk and appropriate interventions in the flowsheet.    Outcome: Progressing Towards Goal  Fall Risk Interventions:        Mentation Interventions: Adequate sleep, hydration, pain control, Door open when patient unattended, Increase mobility, More frequent rounding, Reorient patient, Room close to nurse's station, Update white board     Medication Interventions: Assess postural VS orthostatic hypotension, Bed/chair exit alarm, Evaluate medications/consider consulting pharmacy, Patient to call before getting OOB, Teach patient to arise slowly     Elimination Interventions: Call light in reach, Toileting schedule/hourly rounds

## 2017-08-27 LAB
ANION GAP SERPL CALC-SCNC: 12 MMOL/L (ref 5–15)
BACTERIA SPEC CULT: ABNORMAL
BASOPHILS # BLD: 0 K/UL (ref 0–0.1)
BASOPHILS NFR BLD: 0 % (ref 0–1)
BUN SERPL-MCNC: 46 MG/DL (ref 6–20)
BUN/CREAT SERPL: 13 (ref 12–20)
CALCIUM SERPL-MCNC: 7.7 MG/DL (ref 8.5–10.1)
CHLORIDE SERPL-SCNC: 98 MMOL/L (ref 97–108)
CO2 SERPL-SCNC: 25 MMOL/L (ref 21–32)
CREAT SERPL-MCNC: 3.49 MG/DL (ref 0.7–1.3)
EOSINOPHIL # BLD: 0.6 K/UL (ref 0–0.4)
EOSINOPHIL NFR BLD: 6 % (ref 0–7)
ERYTHROCYTE [DISTWIDTH] IN BLOOD BY AUTOMATED COUNT: 15.8 % (ref 11.5–14.5)
GLUCOSE SERPL-MCNC: 132 MG/DL (ref 65–100)
HCT VFR BLD AUTO: 25.4 % (ref 36.6–50.3)
HGB BLD-MCNC: 8.1 G/DL (ref 12.1–17)
LYMPHOCYTES # BLD: 1.2 K/UL (ref 0.8–3.5)
LYMPHOCYTES NFR BLD: 12 % (ref 12–49)
MAGNESIUM SERPL-MCNC: 1.8 MG/DL (ref 1.6–2.4)
MCH RBC QN AUTO: 27.3 PG (ref 26–34)
MCHC RBC AUTO-ENTMCNC: 31.9 G/DL (ref 30–36.5)
MCV RBC AUTO: 85.5 FL (ref 80–99)
MONOCYTES # BLD: 0.5 K/UL (ref 0–1)
MONOCYTES NFR BLD: 5 % (ref 5–13)
NEUTS SEG # BLD: 7.7 K/UL (ref 1.8–8)
NEUTS SEG NFR BLD: 77 % (ref 32–75)
PHOSPHATE SERPL-MCNC: 4.7 MG/DL (ref 2.6–4.7)
PLATELET # BLD AUTO: 185 K/UL (ref 150–400)
POTASSIUM SERPL-SCNC: 3.2 MMOL/L (ref 3.5–5.1)
RBC # BLD AUTO: 2.97 M/UL (ref 4.1–5.7)
SERVICE CMNT-IMP: ABNORMAL
SODIUM SERPL-SCNC: 135 MMOL/L (ref 136–145)
WBC # BLD AUTO: 10.1 K/UL (ref 4.1–11.1)

## 2017-08-27 PROCEDURE — 74011000258 HC RX REV CODE- 258: Performed by: SURGERY

## 2017-08-27 PROCEDURE — 84100 ASSAY OF PHOSPHORUS: CPT | Performed by: INTERNAL MEDICINE

## 2017-08-27 PROCEDURE — 36415 COLL VENOUS BLD VENIPUNCTURE: CPT | Performed by: INTERNAL MEDICINE

## 2017-08-27 PROCEDURE — 74011000250 HC RX REV CODE- 250: Performed by: INTERNAL MEDICINE

## 2017-08-27 PROCEDURE — 80048 BASIC METABOLIC PNL TOTAL CA: CPT | Performed by: INTERNAL MEDICINE

## 2017-08-27 PROCEDURE — 85025 COMPLETE CBC W/AUTO DIFF WBC: CPT | Performed by: INTERNAL MEDICINE

## 2017-08-27 PROCEDURE — 77030006998

## 2017-08-27 PROCEDURE — 83735 ASSAY OF MAGNESIUM: CPT | Performed by: INTERNAL MEDICINE

## 2017-08-27 PROCEDURE — 94640 AIRWAY INHALATION TREATMENT: CPT

## 2017-08-27 PROCEDURE — 74011250636 HC RX REV CODE- 250/636: Performed by: INTERNAL MEDICINE

## 2017-08-27 PROCEDURE — 77010033678 HC OXYGEN DAILY

## 2017-08-27 PROCEDURE — 74011250636 HC RX REV CODE- 250/636: Performed by: SURGERY

## 2017-08-27 PROCEDURE — 74011250637 HC RX REV CODE- 250/637: Performed by: INTERNAL MEDICINE

## 2017-08-27 PROCEDURE — 77030018798 HC PMP KT ENTRL FED COVD -A

## 2017-08-27 PROCEDURE — 65660000001 HC RM ICU INTERMED STEPDOWN

## 2017-08-27 RX ADMIN — Medication 10 ML: at 22:11

## 2017-08-27 RX ADMIN — COLLAGENASE SANTYL: 250 OINTMENT TOPICAL at 08:43

## 2017-08-27 RX ADMIN — MEROPENEM 500 MG: 500 INJECTION, POWDER, FOR SOLUTION INTRAVENOUS at 10:24

## 2017-08-27 RX ADMIN — HEPARIN SODIUM 5000 UNITS: 5000 INJECTION, SOLUTION INTRAVENOUS; SUBCUTANEOUS at 22:58

## 2017-08-27 RX ADMIN — IPRATROPIUM BROMIDE AND ALBUTEROL SULFATE 3 ML: .5; 3 SOLUTION RESPIRATORY (INHALATION) at 01:06

## 2017-08-27 RX ADMIN — MEROPENEM 500 MG: 500 INJECTION, POWDER, FOR SOLUTION INTRAVENOUS at 22:11

## 2017-08-27 RX ADMIN — IPRATROPIUM BROMIDE AND ALBUTEROL SULFATE 3 ML: .5; 3 SOLUTION RESPIRATORY (INHALATION) at 14:48

## 2017-08-27 RX ADMIN — PANTOPRAZOLE SODIUM 40 MG: 40 TABLET, DELAYED RELEASE ORAL at 07:27

## 2017-08-27 RX ADMIN — IPRATROPIUM BROMIDE AND ALBUTEROL SULFATE 3 ML: .5; 3 SOLUTION RESPIRATORY (INHALATION) at 07:45

## 2017-08-27 RX ADMIN — HEPARIN SODIUM 5000 UNITS: 5000 INJECTION, SOLUTION INTRAVENOUS; SUBCUTANEOUS at 07:30

## 2017-08-27 RX ADMIN — Medication 10 ML: at 15:09

## 2017-08-27 RX ADMIN — IPRATROPIUM BROMIDE AND ALBUTEROL SULFATE 3 ML: .5; 3 SOLUTION RESPIRATORY (INHALATION) at 19:36

## 2017-08-27 RX ADMIN — HEPARIN SODIUM 5000 UNITS: 5000 INJECTION, SOLUTION INTRAVENOUS; SUBCUTANEOUS at 15:08

## 2017-08-27 RX ADMIN — Medication 10 ML: at 06:00

## 2017-08-27 RX ADMIN — ASPIRIN 81 MG 81 MG: 81 TABLET ORAL at 08:40

## 2017-08-27 NOTE — PROGRESS NOTES
Intensivist / Pulmonary / Critical Care  Assessment / Plan:      · Sepsis- likely from GNR UTI- has GNR bacteremia- E coli - ESBL   · Large sacral decub- eval'd by surgery and not felt to be source of sepsis. · Chronic trach with Acute VDRF- CXR clear. Basilar ATX. · FREDERICK on CKD  · Hyperkalemia  · Prostate ca  · H/o CABG  · H/o PEA arrest  · Chronic trach    --abx  --pulm toilet  --TC  --supportive care    Transfer IMCU     History / Subjective:  Hospital Day: 6 - Interval history and notes reviewed     Came off vent to trach collar yesterday without difficulty. Plans to stop dialysis noted  Palliative care following  He is a partial code. Requires suctioning every 2 hours    Objective:  Patient Vitals for the past 4 hrs:   BP Temp Pulse Resp SpO2   17 1000 100/56 - 87 26 100 %   17 0900 97/52 - 86 26 100 %   17 0800 99/51 98.3 °F (36.8 °C) 84 27 100 %   17 0747 - - - - 100 %   Temp (24hrs), Av.2 °F (36.8 °C), Min:97.6 °F (36.4 °C), Max:98.6 °F (37 °C)      Intake/Output Summary (Last 24 hours) at 17 1009  Last data filed at 17 0800   Gross per 24 hour   Intake          2092.79 ml   Output              652 ml   Net          1440.79 ml     Lab Results   Component Value Date/Time    Glucose (POC) 98 2017 04:37 AM    Glucose (POC) 90 2017 09:10 PM    Glucose (POC) 83 2017 02:12 PM    Glucose (POC) 120 2017 05:48 AM    Glucose (POC) 77 2017 05:34 AM     Exam:  No distress  On trach collar  Data:   Interval lab and diagnostic data was reviewed. Interval radiology images were independently viewed and available reports were reviewed.        Lab:  Recent Labs      17   0450  17   0404  17   0432   WBC  10.1  10.2  10.2   HGB  8.1*  8.0*  7.9*   PLT  185  193  178   NA  135*  135*  138   K  3.2*  3.0*  3.5   CL  98  99  103   CO2  25  27  29   BUN  46*  36*  29*   CREA  3.49*  2.64*  1.91*   GLU  132*  128*  93   CA  7.7*  7.2*  7.4* MG  1.8   --    --    PHOS  4.7   --    --        Krzysztof Hadley MD

## 2017-08-27 NOTE — PROGRESS NOTES
0730 Bedside shift change report given to Lilli RN(oncoming nurse) by Terri Watson (offgoing nurse). Report included the following information SBAR, Kardex, Procedure Summary, Intake/Output, MAR, Accordion and Recent Results. 46 Wife tearful at bedside - concerned that patient is not \"nodding\" to yes / no questions like he was yesterday. Patient eyes are open spontaneously tracks + follows. Moves both arms to commands and able to hold up 2 fingers when asked. MD at bedside Dr. Catalina Briones. Wife asking for dialysis. MD states renal will round soon to decide. Wife verbalizes understanding. 5600 Dr. Shannan Hodgson at bedside - no plans for dialysis today     Shift summary: Patient alert, eyes open spontaneously, tracks and follows. Moves upper extremities to command. Nods head 'no' to pain. On trach collar all day. Thick yellow secretions. Trach care completed @ 1800 w/ inner cannula change, tolerated well. Tolerating tube feeds @ goal. Awaiting IMCU bed. Wife at bedside all day. 1930 Bedside shift change report given to [address] (oncoming nurse) by Lilli RN (offgoing nurse). Report included the following information SBAR, Kardex, Procedure Summary, Intake/Output, MAR, Accordion and Recent Results.

## 2017-08-27 NOTE — PROGRESS NOTES
1930: Bedside and Verbal shift change report given to Estela Luis RN (oncoming nurse) by Seun Okeefe RN (offgoing nurse). Report included the following information SBAR, Kardex, Intake/Output, MAR, Accordion, Recent Results and Cardiac Rhythm NSR w PVCs. 0730: Bedside and Verbal shift change report given to [place] [facility] RN (oncoming nurse) by Estela Luis RN (offgoing nurse). Report included the following information SBAR, Kardex, Intake/Output, MAR, Accordion, Recent Results and Cardiac Rhythm NSR w PVCs. Shift Summary  No acute events overnight. VSS, remains on trach collar without any issues. Able to clear secretions with productive cough. Wound dressings changed per order and incontinence cream applied to groin area. Pt nods appropriately on occasion and is sometimes able to gesture or mouth some words. Follows commands. Wife at bedside and agrees with plan of care overnight. Emotional support provided as she expressed this as being a difficult time for her. All questions answered to her satisfaction. Tolerating TFs. Occasional BM smear on bed pad.

## 2017-08-27 NOTE — ROUTINE PROCESS
1930: Bedside and Verbal shift change report given to Jose Vogel RN (oncoming nurse) by Belkys Devlin RN (offgoing nurse). Report included the following information SBAR, Kardex, ED Summary, Intake/Output, MAR, Recent Results, Med Rec Status and Cardiac Rhythm SR. Shift summary: Patient eyes open to voice, follows commands, nods appropriately. Trach collar, gutierrez, peg, kimberly, and central line in place. Tolerating tube feeds at goal 50 ml/hr. Awaiting bed placement on IMCU. Wife at bedside for entirety of shift, tearful at times. Comfort and support provided. ICU cam unable to assess. 0730: Bedside and Verbal shift change report given to Belkys Devlin RN (oncoming nurse) by Jose Vogel RN (offgoing nurse).  Report included the following information SBAR, Kardex, ED Summary, Intake/Output, MAR, Recent Results, Med Rec Status and Cardiac Rhythm SR.

## 2017-08-27 NOTE — PROGRESS NOTES
Problem: Falls - Risk of  Goal: *Absence of Falls  Document Jmaes Fall Risk and appropriate interventions in the flowsheet.    Outcome: Progressing Towards Goal  Fall Risk Interventions:        Mentation Interventions: Door open when patient unattended, Evaluate medications/consider consulting pharmacy     Medication Interventions: Evaluate medications/consider consulting pharmacy     Elimination Interventions: Call light in reach                 Problem: Pressure Injury - Risk of  Goal: *Prevention of pressure ulcer  Outcome: Progressing Towards Goal  Wound care as ordered - turn and position q2 hours and PRN     Problem: Nutrition Deficit  Goal: *Optimize nutritional status  Outcome: Progressing Towards Goal  Tube feeds @ goal

## 2017-08-27 NOTE — PROGRESS NOTES
Hospitalist Progress Note  Rosie Mcfadden MD  Office: [phone]  Cell:       Date of Service:  2017  NAME:  Mireya Phipps  :  11/10/1933  MRN:  [id number]      Admission Summary:     The patient is an 54-year-old patient who originally was a resident of [place], [place]. He   was admitted in [facility] on 2017 due to hypotension, hyperventilation and confusion. Since the patient was unable to be extubated while in the care in the ICU, then tracheostomy and PEG was done and the patient was transferred to a long term care facility   that was in [place]. his morning the patient continued with shortness of breath, for which we will transfer the patient to our ER. While in our ER, the patient was connected to the ventilator through the tracheostomy.      Interval history / Subjective: Wife present at bedside during my exam. Tearful. Patient seems less responsive per wife. Assessment & Plan:     1. Sepsis with septic shock  UTI with ESBL E coli, as well as E coli bacteremmia, shock has improved, but borderline low BP. Continuing Merropenem. 2. Bacteremia with ESBL e coli with sepsis from above   On meropenem.     3. Acute and chronic renal failure,due to hypotension from sepsis  Status post 2 HD, but no further HD plan per nephrology and recommending comfort care.      4. Hyperkalemic, hypernatremic, dehydration   Improved.      5. Acute hypoxic Respiratory failure  Extubated to trache collar. PCCM on board, transferred to [facility].      6. Coronary artery disease - status post CABG.  Symptomatic treatment at   this time in this patient.       7. Decubital ulcer -PROVIDENCIA STUARTII    Also c/s to meropenem no debridement planned per surgery.     8.  Anemia   Possibly from chronic dz monitor s/p 1 unit BT      9. Severe Protein Calorie Malnutrition      Code status: DNR  DVT prophylaxis: On heparin    Care Plan discussed with: Patient/Family  Disposition: TBD     Hospital Problems  Never Reviewed          Codes Class Noted POA    UTI (urinary tract infection) due to urinary indwelling catheter [facility]) ICD-10-CM: T83.511A, N39.0  ICD-9-CM: 996.64, 599.0  8/22/2017 Unknown        Sepsis affecting skin ICD-10-CM: L02.91  ICD-9-CM: 682.9  8/22/2017 Unknown                Review of Systems:   Pertinent items are noted in HPI. Vital Signs:    Last 24hrs VS reviewed since prior progress note. Most recent are:  Visit Vitals    BP (!) 87/54 (BP 1 Location: Left arm, BP Patient Position: At rest)    Pulse 78    Temp 97.9 °F (36.6 °C)    Resp 26    Ht 6' (1.829 m)    Wt 65.2 kg (143 lb 11.8 oz)    SpO2 100%    BMI 19.49 kg/m2         Intake/Output Summary (Last 24 hours) at 08/27/17 1623  Last data filed at 08/27/17 1400   Gross per 24 hour   Intake          1932.79 ml   Output              597 ml   Net          1335.79 ml        Physical Examination:             Constitutional:  Patient remains drowsy   ENT:  Oral mucous moist, oropharynx benign. Neck supple,    Resp:  CTA bilaterally. No wheezing/rhonchi/rales. No accessory muscle use   CV:  Regular rhythm, normal rate, no murmurs, gallops, rubs    GI:  Soft, non distended, non tender. normoactive bowel sounds, no hepatosplenomegaly     Musculoskeletal:  No edema, warm, 2+ pulses throughout    Neurologic:  Moves all extremities.   Drowsy              Data Review:    Review and/or order of clinical lab test      Labs:     Recent Labs      08/27/17   0450  08/26/17   0404   WBC  10.1  10.2   HGB  8.1*  8.0*   HCT  25.4*  25.7*   PLT  185  193     Recent Labs      08/27/17   0450  08/26/17   0404  08/25/17   0432   NA  135*  135*  138   K  3.2*  3.0*  3.5   CL  98  99  103   CO2  25  27  29   BUN  46*  36*  29*   CREA  3.49*  2.64*  1.91*   GLU  132*  128*  93   CA  7.7*  7.2*  7.4*   MG  1.8   --    --    PHOS  4.7   --    --      No results for input(s): MIGUELANGEL GREENE, ALT, AP, TBIL, TBILI, TP, ALB, GLOB, GGT, AML, LPSE in the last 72 hours. No lab exists for component: AMYP, HLPSE  No results for input(s): INR, PTP, APTT in the last 72 hours. No lab exists for component: INREXT   No results for input(s): FE, TIBC, PSAT, FERR in the last 72 hours. No results found for: FOL, RBCF   No results for input(s): PH, PCO2, PO2 in the last 72 hours. No results for input(s): CPK, CKNDX, TROIQ in the last 72 hours.     No lab exists for component: CPKMB  No results found for: CHOL, CHOLX, CHLST, CHOLV, HDL, LDL, LDLC, DLDLP, TGLX, TRIGL, TRIGP, CHHD, CHHDX  Lab Results   Component Value Date/Time    Glucose (POC) 98 08/25/2017 04:37 AM    Glucose (POC) 90 08/24/2017 09:10 PM    Glucose (POC) 83 08/24/2017 02:12 PM    Glucose (POC) 120 08/24/2017 05:48 AM    Glucose (POC) 77 08/24/2017 05:34 AM     Lab Results   Component Value Date/Time    Color YELLOW/STRAW 08/22/2017 12:14 PM    Appearance TURBID 08/22/2017 12:14 PM    Specific gravity 1.010 08/22/2017 12:14 PM    pH (UA) 7.0 08/22/2017 12:14 PM    Protein 100 08/22/2017 12:14 PM    Glucose NEGATIVE  08/22/2017 12:14 PM    Ketone NEGATIVE  08/22/2017 12:14 PM    Bilirubin NEGATIVE  08/22/2017 12:14 PM    Urobilinogen 0.2 08/22/2017 12:14 PM    Nitrites NEGATIVE  08/22/2017 12:14 PM    Leukocyte Esterase LARGE 08/22/2017 12:14 PM    Epithelial cells FEW 08/22/2017 12:14 PM    Bacteria 4+ 08/22/2017 12:14 PM    WBC  08/22/2017 12:14 PM    RBC 20-50 08/22/2017 12:14 PM         Medications Reviewed:     Current Facility-Administered Medications   Medication Dose Route Frequency    meropenem (MERREM) 500 mg in 0.9% sodium chloride (MBP/ADV) 50 mL  0.5 g IntraVENous Q12H    dextrose (D50W) injection syrg 25 g  25 g IntraVENous PRN    0.9% sodium chloride infusion 250 mL  250 mL IntraVENous PRN    fentaNYL citrate (PF) injection 25 mcg  25 mcg IntraVENous Q1H PRN    gelatin adsorbable (GELFOAM) 12-7 mm sponge   Topical PRN    sodium chloride (NS) flush 5-10 mL  5-10 mL IntraVENous Q8H    sodium chloride (NS) flush 5-10 mL  5-10 mL IntraVENous PRN    acetaminophen (TYLENOL) tablet 650 mg  650 mg Oral Q4H PRN    HYDROcodone-acetaminophen (NORCO) 5-325 mg per tablet 1 Tab  1 Tab Oral Q4H PRN    ondansetron (ZOFRAN ODT) tablet 4 mg  4 mg Oral Q4H PRN    heparin (porcine) injection 5,000 Units  5,000 Units SubCUTAneous Q8H    acetaminophen (TYLENOL) suppository 650 mg  650 mg Rectal Q4H PRN    albuterol-ipratropium (DUO-NEB) 2.5 MG-0.5 MG/3 ML  3 mL Nebulization Q6H RT    aspirin chewable tablet 81 mg  81 mg Per G Tube DAILY    pantoprazole (PROTONIX) 2 mg/mL oral suspension 40 mg  40 mg PEG Tube ACB    collagenase (SANTYL) 250 unit/gram ointment   Topical DAILY     ______________________________________________________________________  EXPECTED LENGTH OF STAY: 4d 21h  ACTUAL LENGTH OF STAY:          5                 Rayo Quezada MD

## 2017-08-27 NOTE — PROGRESS NOTES
Follow-up visit with patient per request of staff and  Hong. Patient's wife was present at the bedside and very tearful. Provided empathetic listening as wife shared her fears that the patient was dying while she just watched. Helped wife to process emotions and facilitated conversation about goals and expectations and explored wife's understanding of patient's condition. Wife stated that she understands that the patient is actively dying but is struggling with the fact that he is so unresponsive and wishes to be able to interact with him again. Wife is focused on attempting dialysis one more time and believes that this will provide the patient with the opportunity to speak again and her with the closure to know she tried everything. Helped wife to recognize her own anticipatory grief and desire to avoid further pain. Shared prayer at the bedside per request of patient's wife and assured of ongoing support as needed and desired. DESMOND Roberson.Erin.    Paging Service [phone])

## 2017-08-28 LAB
ANION GAP SERPL CALC-SCNC: 11 MMOL/L (ref 5–15)
BUN SERPL-MCNC: 49 MG/DL (ref 6–20)
BUN/CREAT SERPL: 12 (ref 12–20)
CALCIUM SERPL-MCNC: 7.4 MG/DL (ref 8.5–10.1)
CHLORIDE SERPL-SCNC: 98 MMOL/L (ref 97–108)
CO2 SERPL-SCNC: 26 MMOL/L (ref 21–32)
CREAT SERPL-MCNC: 4.06 MG/DL (ref 0.7–1.3)
ERYTHROCYTE [DISTWIDTH] IN BLOOD BY AUTOMATED COUNT: 15.9 % (ref 11.5–14.5)
GLUCOSE SERPL-MCNC: 119 MG/DL (ref 65–100)
HCT VFR BLD AUTO: 24.6 % (ref 36.6–50.3)
HGB BLD-MCNC: 8 G/DL (ref 12.1–17)
MCH RBC QN AUTO: 27.8 PG (ref 26–34)
MCHC RBC AUTO-ENTMCNC: 32.5 G/DL (ref 30–36.5)
MCV RBC AUTO: 85.4 FL (ref 80–99)
PLATELET # BLD AUTO: 196 K/UL (ref 150–400)
POTASSIUM SERPL-SCNC: 3.3 MMOL/L (ref 3.5–5.1)
RBC # BLD AUTO: 2.88 M/UL (ref 4.1–5.7)
SODIUM SERPL-SCNC: 135 MMOL/L (ref 136–145)
WBC # BLD AUTO: 9.1 K/UL (ref 4.1–11.1)

## 2017-08-28 PROCEDURE — 94640 AIRWAY INHALATION TREATMENT: CPT

## 2017-08-28 PROCEDURE — 77030013140 HC MSK NEB VYRM -A

## 2017-08-28 PROCEDURE — 85027 COMPLETE CBC AUTOMATED: CPT | Performed by: FAMILY MEDICINE

## 2017-08-28 PROCEDURE — 36415 COLL VENOUS BLD VENIPUNCTURE: CPT | Performed by: FAMILY MEDICINE

## 2017-08-28 PROCEDURE — 74011250636 HC RX REV CODE- 250/636: Performed by: INTERNAL MEDICINE

## 2017-08-28 PROCEDURE — 65660000000 HC RM CCU STEPDOWN

## 2017-08-28 PROCEDURE — 74011000258 HC RX REV CODE- 258: Performed by: SURGERY

## 2017-08-28 PROCEDURE — 36591 DRAW BLOOD OFF VENOUS DEVICE: CPT

## 2017-08-28 PROCEDURE — 74011250636 HC RX REV CODE- 250/636: Performed by: SURGERY

## 2017-08-28 PROCEDURE — 77030021668 HC NEB PREFIL KT VYRM -A

## 2017-08-28 PROCEDURE — 74011000250 HC RX REV CODE- 250: Performed by: INTERNAL MEDICINE

## 2017-08-28 PROCEDURE — 74011250637 HC RX REV CODE- 250/637: Performed by: INTERNAL MEDICINE

## 2017-08-28 PROCEDURE — 77030009834 HC MSK O2 TRACH VYRM -A

## 2017-08-28 PROCEDURE — 77010033678 HC OXYGEN DAILY

## 2017-08-28 PROCEDURE — 77030018798 HC PMP KT ENTRL FED COVD -A

## 2017-08-28 PROCEDURE — 80048 BASIC METABOLIC PNL TOTAL CA: CPT | Performed by: FAMILY MEDICINE

## 2017-08-28 RX ORDER — POTASSIUM CHLORIDE 14.9 MG/ML
10 INJECTION INTRAVENOUS
Status: COMPLETED | OUTPATIENT
Start: 2017-08-28 | End: 2017-08-29

## 2017-08-28 RX ORDER — SODIUM CHLORIDE 0.9 % (FLUSH) 0.9 %
10-30 SYRINGE (ML) INJECTION AS NEEDED
Status: DISCONTINUED | OUTPATIENT
Start: 2017-08-28 | End: 2017-09-02 | Stop reason: HOSPADM

## 2017-08-28 RX ORDER — SODIUM CHLORIDE 0.9 % (FLUSH) 0.9 %
20 SYRINGE (ML) INJECTION EVERY 24 HOURS
Status: DISCONTINUED | OUTPATIENT
Start: 2017-08-28 | End: 2017-09-02 | Stop reason: HOSPADM

## 2017-08-28 RX ORDER — SODIUM CHLORIDE 0.9 % (FLUSH) 0.9 %
10 SYRINGE (ML) INJECTION EVERY 24 HOURS
Status: DISCONTINUED | OUTPATIENT
Start: 2017-08-28 | End: 2017-09-02 | Stop reason: HOSPADM

## 2017-08-28 RX ORDER — SODIUM CHLORIDE 0.9 % (FLUSH) 0.9 %
10 SYRINGE (ML) INJECTION AS NEEDED
Status: DISCONTINUED | OUTPATIENT
Start: 2017-08-28 | End: 2017-09-02 | Stop reason: HOSPADM

## 2017-08-28 RX ORDER — BACITRACIN 500 UNIT/G
1 PACKET (EA) TOPICAL AS NEEDED
Status: DISCONTINUED | OUTPATIENT
Start: 2017-08-28 | End: 2017-09-02 | Stop reason: HOSPADM

## 2017-08-28 RX ORDER — SODIUM CHLORIDE 0.9 % (FLUSH) 0.9 %
10-40 SYRINGE (ML) INJECTION EVERY 8 HOURS
Status: DISCONTINUED | OUTPATIENT
Start: 2017-08-28 | End: 2017-09-02 | Stop reason: HOSPADM

## 2017-08-28 RX ADMIN — ASPIRIN 81 MG 81 MG: 81 TABLET ORAL at 08:24

## 2017-08-28 RX ADMIN — Medication 10 ML: at 14:00

## 2017-08-28 RX ADMIN — IPRATROPIUM BROMIDE AND ALBUTEROL SULFATE 3 ML: .5; 3 SOLUTION RESPIRATORY (INHALATION) at 13:53

## 2017-08-28 RX ADMIN — HEPARIN SODIUM 5000 UNITS: 5000 INJECTION, SOLUTION INTRAVENOUS; SUBCUTANEOUS at 15:08

## 2017-08-28 RX ADMIN — MEROPENEM 500 MG: 500 INJECTION, POWDER, FOR SOLUTION INTRAVENOUS at 21:49

## 2017-08-28 RX ADMIN — IPRATROPIUM BROMIDE AND ALBUTEROL SULFATE 3 ML: .5; 3 SOLUTION RESPIRATORY (INHALATION) at 01:40

## 2017-08-28 RX ADMIN — POTASSIUM CHLORIDE 10 MEQ: 200 INJECTION, SOLUTION INTRAVENOUS at 12:55

## 2017-08-28 RX ADMIN — Medication 20 ML: at 11:43

## 2017-08-28 RX ADMIN — Medication 10 ML: at 23:50

## 2017-08-28 RX ADMIN — POTASSIUM CHLORIDE 10 MEQ: 200 INJECTION, SOLUTION INTRAVENOUS at 14:00

## 2017-08-28 RX ADMIN — HEPARIN SODIUM 5000 UNITS: 5000 INJECTION, SOLUTION INTRAVENOUS; SUBCUTANEOUS at 07:03

## 2017-08-28 RX ADMIN — IPRATROPIUM BROMIDE AND ALBUTEROL SULFATE 3 ML: .5; 3 SOLUTION RESPIRATORY (INHALATION) at 20:27

## 2017-08-28 RX ADMIN — POTASSIUM CHLORIDE 10 MEQ: 200 INJECTION, SOLUTION INTRAVENOUS at 11:43

## 2017-08-28 RX ADMIN — Medication 10 ML: at 06:12

## 2017-08-28 RX ADMIN — POTASSIUM CHLORIDE 10 MEQ: 200 INJECTION, SOLUTION INTRAVENOUS at 15:08

## 2017-08-28 RX ADMIN — MEROPENEM 500 MG: 500 INJECTION, POWDER, FOR SOLUTION INTRAVENOUS at 10:13

## 2017-08-28 RX ADMIN — COLLAGENASE SANTYL: 250 OINTMENT TOPICAL at 08:25

## 2017-08-28 RX ADMIN — PANTOPRAZOLE SODIUM 40 MG: 40 TABLET, DELAYED RELEASE ORAL at 08:24

## 2017-08-28 RX ADMIN — Medication 10 ML: at 11:43

## 2017-08-28 RX ADMIN — HEPARIN SODIUM 5000 UNITS: 5000 INJECTION, SOLUTION INTRAVENOUS; SUBCUTANEOUS at 22:38

## 2017-08-28 RX ADMIN — IPRATROPIUM BROMIDE AND ALBUTEROL SULFATE 3 ML: .5; 3 SOLUTION RESPIRATORY (INHALATION) at 07:30

## 2017-08-28 RX ADMIN — Medication 10 ML: at 21:49

## 2017-08-28 NOTE — PROGRESS NOTES
Problem: Falls - Risk of  Goal: *Absence of Falls  Document James Fall Risk and appropriate interventions in the flowsheet. Outcome: Progressing Towards Goal  Fall Risk Interventions:        Mentation Interventions: Door open when patient unattended, Evaluate medications/consider consulting pharmacy, Toileting rounds     Medication Interventions: Evaluate medications/consider consulting pharmacy     Elimination Interventions: Call light in reach, Toileting schedule/hourly rounds                 Problem: Pressure Injury - Risk of  Goal: *Prevention of pressure ulcer  Outcome: Not Progressing Towards Goal  Patient with multiple pressure injuries prior to admission.     Problem: Nutrition Deficit  Goal: *Optimize nutritional status  Outcome: Progressing Towards Goal  Tolerating TF at goal.

## 2017-08-28 NOTE — PALLIATIVE CARE
Palliative Medicine Social Work    Dr. Bridger Aragon and I met with patient's wife, Mamadou Lindquist. She was tired and sad about his current state. She was able to relate her understanding that he is no longer a candidate for HD; that it would not influence prognosis or improve his quality of life. She is also able to contemplate he will not survive this admission. We talked about option of a complete shift in focus of his care to comfort. She is not quite ready for this, feeling the shift in focus may hasten his death or cause him to lose consciousness sooner. She feels as long as he is \"stable\" and \"conscious\" we should just keep doing we're doing. Talked about the concern that soon the things we're doing may become burdensome. Informed that he will soon be finished with his antibiotic course; that at some point he may not tolerate his feedings or the fluid being administered may start to build up in tissue or lungs since his kidneys are compromised. She is focused on his gutierrez and knows that he is still putting out urine. Open to ongoing conversations about this shift when we begin to see signs of his inability to tolerate. Goals are clear for now for full DNR (no chest compressions, intubation, BiPAP, pressors). We are not escalating his care in the context of decline. Wife will have some peace if he \"just dies in a natural way\". Will continue to support. Thank you for the opportunity to be involved in the care of Mr. Mendel Abed. Alejandra Skinner, ABBYW, [facility]-  Palliative Medicine   [employer] ® ACP Facilitator   [phone]

## 2017-08-28 NOTE — PROGRESS NOTES
0730 Bedside shift change report given to [facility] (oncoming nurse) by Rosalio Fong RN (offgoing nurse). Report included the following information SBAR, Kardex, Intake/Output, MAR, Accordion and Recent Results. Shift summary: Shift uneventful. Patient remains alert, non verbal. Follows simple commands. TF @ goal. Minimal secretions from trach. Tolerated trach care well. VSS. Wife at bedside all day. Awaiting tele bed. 1930 Bedside shift change report given to [facility] (oncoming nurse) by Lilli RN (offgoing nurse). Report included the following information SBAR, Kardex, Procedure Summary, Intake/Output, MAR, Accordion and Recent Results.

## 2017-08-28 NOTE — ROUTINE PROCESS
1930: Bedside and Verbal shift change report given to Gaurav Daugherty RN (oncoming nurse) by Rodolfo Lee RN (offgoing nurse). Report included the following information SBAR, Kardex, Procedure Summary, Intake/Output, MAR, Recent Results, Med Rec Status and Cardiac Rhythm SR. Shift Summary: Uneventful shift, patient opens eyes spontaneously, minimal command following. Tolerating TF at goal 50 ml/hr. Trach care and wound care completed, patient tolerated well. Awaiting telemetry bed.     0730: Bedside and Verbal shift change report given to Nilsa Gaytan RN (oncoming nurse) by Gaurav Daugherty RN (offgoing nurse).  Report included the following information SBAR, Kardex, ED Summary, Intake/Output, MAR, Recent Results, Med Rec Status and Cardiac Rhythm SR.

## 2017-08-28 NOTE — PROGRESS NOTES
Occupational Therapy Screening:  Services are not indicated at this time. [facility] was triggered for occupational therapy based on results obtained during the nursing admission assessment. The patients chart was reviewed and the patient is not appropriate for a skilled therapy evaluation at this time. Please consult occupational therapy if any therapy needs arise. Thank you.     Emilie Guerrero

## 2017-08-28 NOTE — PROGRESS NOTES
Palliative Medicine Consult  Doron: [phone] ([phone])    Patient Name: Cris Bo  YOB: 1933    Date of Initial Consult: 8/23/17  Reason for Consult: Care decisions   Requesting Provider: Glo Roy   Primary Care Physician: Kirsten Camacho MD      SUMMARY:   Cris Bo is a 80 y.o. with a past history of CAD s/p CABG, HTN, DM, prostate cancer, possible CKD who was admitted on 8/22/2017 from [address] [place] [address] with fever and hypotension. Discussed hx w/ wife. As of April this year, pt and wife were living together in apartment- he was able to do his ADLs w/ some assistance but then had a hospital stay at the [facility], then went to [facility]. Soon afteerwards pt had prolonged hospitalization at [facility] on 6/4/17 w/ sepsis during which time was intubated and sedated. Could not be weaned from vent, s/p trach and PEG. This admission found to have GNR UTI and bacteremia on IV abx, as well as FREDERICK w/ hyperkalemia- s/p emergent dialysis. Noted that pt would be a poor long term dialysis candidate. Current medical issues leading to Palliative Medicine involvement include: care decisions. Apparently had hospice at [facility]), remained full code. At this time goals are clear w/ wife Recardo Mungo to cont current measures but no escalation of care. PALLIATIVE DIAGNOSES:   1. Shortness of breath  2. Feeding difficulties, on PEG  3. Mult medical issues as above, sepsis, FREDERICK on CKD  4. Debility   5. Sacral decub      PLAN:   1. Appreciate all good conversation over the weekend, as does wife. She has good insight into what is going on, knows that he very likely will die this hospital stay, but does not want to feel like she is \"killing him\" by taking anything away. She knows that pt cannot have dialysis anymore and that eventually he will not tolerate it.   2. Wife not ready to switch to complete comfort measures, as she wants abx continued until course complete, TFs still run, etc. Do discuss that the abx course will soon be complete, that there will be a time when excess fluids will cause sx such as SOB, swelling, etc as his renal function cont to worsen. 3. While pt still awakens to voice/touch and tolerating IVF, continue current care. 4. Plan is to cont current measures, but no escalation of care. No BIPAP, pressors or other measures. If pt declines, call wife and change to comfort measures (cell phone [phone]). Note that wife knows pt dying, but does not want to feel responsible- so when pt declines, it is because his body is shutting down and he is dying. 5. Communicated plan of care with: Palliative IDT; Lilli RN       GOALS OF CARE / TREATMENT PREFERENCES:   [====Goals of Care====]  GOALS OF CARE:  Patient / health care proxy stated goals: comfort     TREATMENT PREFERENCES:   Code Status: DNR    Advance Care Planning:  Advance Care Planning 8/28/2017   Patient's Healthcare Decision Maker is: Legal Next of Nilton Guzman   Primary Decision Maker Name Kathryn Gonzalez   Primary Decision Maker Relationship to Patient Spouse   Confirm Advance Directive -   Patient Would Like to Complete Advance Directive -   Does the patient have other document types Do Not Resuscitate       Other:    The palliative care team has discussed with patient / health care proxy about goals of care / treatment preferences for patient.  [====Goals of Care====]         HISTORY:         HPI/SUBJECTIVE:    The patient is:   [] Verbal and participatory  [x] Non-participatory due to: medical condition     Pt w/ eyes open, but is not responding to me. Renal function worsening, notes from this weekend reviewed. NAD.     Clinical Pain Assessment (nonverbal scale for severity on nonverbal patients):   [++++ Clinical Pain Assessment++++]  [++++Pain Severity++++]: Pain: 0  [++++Pain Character++++]:   [++++Pain Duration++++]:   [++++Pain Effect++++]:   [++++Pain Factors++++]:   [++++Pain Frequency++++]:   [++++Pain Location++++]:   [++++ Clinical Pain Assessment++++]  Duration: for how long has pt been experiencing pain (e.g., 2 days, 1 month, years)  Frequency: how often pain is an issue (e.g., several times per day, once every few days, constant)     FUNCTIONAL ASSESSMENT:     Palliative Performance Scale (PPS):  PPS: 20       PSYCHOSOCIAL/SPIRITUAL SCREENING:     Advance Care Planning:  Advance Care Planning 8/28/2017   Patient's Healthcare Decision Maker is: Legal Next of Nilton Guzman   Primary Decision Maker Name Jesus Manuel Lerma   Primary Decision Maker Relationship to Patient Spouse   Confirm Advance Directive -   Patient Would Like to Complete Advance Directive -   Does the patient have other document types Do Not Resuscitate        Any spiritual / Confucianism concerns:  [] Yes /  [x] No    Caregiver Burnout:  [] Yes /  [x] No /  [] No Caregiver Present      Anticipatory grief assessment:   [x] Normal  / [] Maladaptive       ESAS Anxiety:   Cannot obtain due to patient factors    ESAS Depression:   Cannot obtain due to patient factors         REVIEW OF SYSTEMS:     Positive and pertinent negative findings in ROS are noted above in HPI. The following systems were [] reviewed / [x] unable to be reviewed as noted in HPI  Other findings are noted below. Systems: constitutional, ears/nose/mouth/throat, respiratory, gastrointestinal, genitourinary, musculoskeletal, integumentary, neurologic, psychiatric, endocrine. Positive findings noted below. Modified ESAS Completed by: provider   Fatigue: 10 Drowsiness: 10     Pain: 0           Dyspnea: 0     Constipation: No     Stool Occurrence(s): 1        PHYSICAL EXAM:     From RN flowsheet:  Wt Readings from Last 3 Encounters:   08/28/17 139 lb 12.4 oz (63.4 kg)     Blood pressure 113/64, pulse 87, temperature 98.2 °F (36.8 °C), resp. rate 20, height 6' (1.829 m), weight 139 lb 12.4 oz (63.4 kg), SpO2 100 %.     Pain Scale 1: Adult Nonverbal Pain Scale  Pain Intensity 1: 0                 Constitutional: pale, chronically ill appearing, temporal wasting   Eyes: pupils equal, anicteric  ENMT: no nasal discharge, moist mucous membranes  Respiratory: breathing not labored, symmetric, trach   Gastrointestinal: soft non-tender, +bowel sounds  Musculoskeletal: no deformity, no tenderness to palpation  Skin: sacral decub, did not examine   Neurologic: opens his eyes to voice        HISTORY:     Active Problems:    UTI (urinary tract infection) due to urinary indwelling catheter ([address] (8/22/2017)      Sepsis affecting skin (8/22/2017)      Past Medical History:   Diagnosis Date    CAD (coronary artery disease)     Hx of CABG       History reviewed. No pertinent surgical history. History reviewed. No pertinent family history. History reviewed, no pertinent family history.   Social History   Substance Use Topics    Smoking status: Not on file    Smokeless tobacco: Not on file    Alcohol use Not on file     No Known Allergies   Current Facility-Administered Medications   Medication Dose Route Frequency    alteplase (CATHFLO) 1 mg in sterile water (preservative free) 1 mL injection  1 mg InterCATHeter PRN    bacitracin 500 unit/gram packet 1 Packet  1 Packet Topical PRN    sodium chloride (NS) flush 10-30 mL  10-30 mL InterCATHeter PRN    sodium chloride (NS) flush 10 mL  10 mL InterCATHeter Q24H    sodium chloride (NS) flush 10 mL  10 mL InterCATHeter PRN    sodium chloride (NS) flush 10-40 mL  10-40 mL InterCATHeter Q8H    sodium chloride (NS) flush 20 mL  20 mL InterCATHeter Q24H    meropenem (MERREM) 500 mg in 0.9% sodium chloride (MBP/ADV) 50 mL  0.5 g IntraVENous Q12H    dextrose (D50W) injection syrg 25 g  25 g IntraVENous PRN    0.9% sodium chloride infusion 250 mL  250 mL IntraVENous PRN    fentaNYL citrate (PF) injection 25 mcg  25 mcg IntraVENous Q1H PRN    gelatin adsorbable (GELFOAM) 12-7 mm sponge   Topical PRN    sodium chloride (NS) flush 5-10 mL  5-10 mL IntraVENous Q8H    sodium chloride (NS) flush 5-10 mL  5-10 mL IntraVENous PRN    acetaminophen (TYLENOL) tablet 650 mg  650 mg Oral Q4H PRN    HYDROcodone-acetaminophen (NORCO) 5-325 mg per tablet 1 Tab  1 Tab Oral Q4H PRN    ondansetron (ZOFRAN ODT) tablet 4 mg  4 mg Oral Q4H PRN    heparin (porcine) injection 5,000 Units  5,000 Units SubCUTAneous Q8H    acetaminophen (TYLENOL) suppository 650 mg  650 mg Rectal Q4H PRN    albuterol-ipratropium (DUO-NEB) 2.5 MG-0.5 MG/3 ML  3 mL Nebulization Q6H RT    aspirin chewable tablet 81 mg  81 mg Per G Tube DAILY    pantoprazole (PROTONIX) 2 mg/mL oral suspension 40 mg  40 mg PEG Tube ACB    collagenase (SANTYL) 250 unit/gram ointment   Topical DAILY          LAB AND IMAGING FINDINGS:     Lab Results   Component Value Date/Time    WBC 9.1 08/28/2017 03:57 AM    HGB 8.0 08/28/2017 03:57 AM    PLATELET 521 79/92/5054 03:57 AM     Lab Results   Component Value Date/Time    Sodium 135 08/28/2017 03:57 AM    Potassium 3.3 08/28/2017 03:57 AM    Chloride 98 08/28/2017 03:57 AM    CO2 26 08/28/2017 03:57 AM    BUN 49 08/28/2017 03:57 AM    Creatinine 4.06 08/28/2017 03:57 AM    Calcium 7.4 08/28/2017 03:57 AM    Magnesium 1.8 08/27/2017 04:50 AM    Phosphorus 4.7 08/27/2017 04:50 AM      Lab Results   Component Value Date/Time    AST (SGOT) 893 08/22/2017 10:00 AM    Alk.  phosphatase 333 08/22/2017 10:00 AM    Protein, total 8.5 08/22/2017 10:00 AM    Albumin 1.7 08/22/2017 10:00 AM    Globulin 6.8 08/22/2017 10:00 AM     Lab Results   Component Value Date/Time    INR 1.3 08/23/2017 04:21 AM    Prothrombin time 13.8 08/23/2017 04:21 AM    aPTT 36.3 08/23/2017 04:21 AM      No results found for: IRON, FE, TIBC, IBCT, PSAT, FERR   No results found for: PH, PCO2, PO2  No components found for: [place]   Lab Results   Component Value Date/Time    CK 25 08/22/2017 01:09 PM                Total time: 35 min   Counseling / coordination time, spent as noted above: 25 min   > 50% counseling / coordination?: yes    Prolonged service was provided for  []30 min   []75 min in face to face time in the presence of the patient, spent as noted above. Time Start:   Time End:   Note: this can only be billed with 52235 (initial) or 06089 (follow up). If multiple start / stop times, list each separately.

## 2017-08-28 NOTE — PROGRESS NOTES
RENAL  PROGRESS NOTE        Subjective:    COMPLAINT: extubated, non verbal.     Objective:   VITALS SIGNS:    Visit Vitals    /59    Pulse 88    Temp 97.9 °F (36.6 °C)    Resp 22    Ht 6' (1.829 m)    Wt 63.4 kg (139 lb 12.4 oz)    SpO2 100%    BMI 18.96 kg/m2       O2 Device: Tracheal collar   O2 Flow Rate (L/min): 8 l/min   Temp (24hrs), Av.7 °F (36.5 °C), Min:96.9 °F (36.1 °C), Max:98 °F (36.7 °C)         PHYSICAL EXAM:  No edema  Open eyes  abd soft    DATA REVIEW:     INTAKE / OUTPUT:   Last shift:      701 - 1900  In: 360 [I.V.:50]  Out: 27 [Urine:27]  Last 3 shifts: 1901 -  0700  In: 3372.8 [I.V.:152.8]  Out: 1097 [Urine:1097]    Intake/Output Summary (Last 24 hours) at 17 1127  Last data filed at 17 1013   Gross per 24 hour   Intake             2260 ml   Output              687 ml   Net             1573 ml         LABS:   Recent Labs      17   0357  17   0450  17   0404   WBC  9.1  10.1  10.2   HGB  8.0*  8.1*  8.0*   HCT  24.6*  25.4*  25.7*   PLT  196  185  193     Recent Labs      17   0357  17   0450  17   0404   NA  135*  135*  135*   K  3.3*  3.2*  3.0*   CL  98  98  99   CO2    27   GLU  119*  132*  128*   BUN  49*  46*  36*   CREA  4.06*  3.49*  2.64*   CA  7.4*  7.7*  7.2*   MG   --   1.8   --    PHOS   --   4.7   --            Assessment:   FREDERICK, likely septic ATN/hypotension with chronic gutierrez, s.p urgent dialysis on 17 and again on 17  kimberly on 17  CKD ? Baseline  prostate CA as per wife  Severe hyperkalemia, resolved s.p urgent dialysis  Septic shock;gram neg rods bacteremia likely from urospesis  VDRF  Cachexia  Anemia as per first team  Plan:        recommend hospice       I had a long discussion with his wife about his dire situation and very poor prognosis. I told her that I did not think that dialysis would help and she agreed not to do any more dialysis.   I told her that it is likely that the patient will not survive this admission given his advanced age, frail state, and significant comorbidities. I recommended that she consider comfort care. She is not quite there yet but did agree not to escalate care from here.          Owen Staley MD

## 2017-08-28 NOTE — PROGRESS NOTES
Follow up visit attempted. Pt appeared to be resting and no family at bedside. Chaplains will continue to follow with hopes of connecting with pt's wife (Gary Valles offered support to wife yesterday).     Machelle Cornejo, Palliative

## 2017-08-28 NOTE — PROGRESS NOTES
Hospitalist Progress Note  Rosie Mcfadden MD  Office: [phone]  Cell:       Date of Service:  2017  NAME:  Mireya Phipps  :  11/10/1933  MRN:  [id number]      Admission Summary:     The patient is an 80-year-old patient who originally was a resident of [place], [place]. He   was admitted in [facility] on 2017 due to hypotension, hyperventilation and confusion.  Since the patient was unable to be extubated while in the care in the ICU, then tracheostomy and PEG was done and the patient was transferred to a long term care facility   that was in [place]. his morning the patient continued with shortness of breath, for which we will transfer the patient to our ER. While in our ER, the patient was connected to the ventilator through the tracheostomy.      Interval history / Subjective:       Patient's wife at bedside. Explained to her that patient's condition has not progressed. He is critically ill  And prognosis is guarded. Wife kept asking whether he needs to have HD. Assessment & Plan:     1. Sepsis with septic shock  UTI with ESBL E coli, as well as E coli bacteremmia, shock has improved, but borderline low BP. Continuing Merropenem.     2. Bacteremia with ESBL e coli  On meropenem.      3. Acute and chronic renal failure,due to hypotension from sepsis  Status post 2 HD, but no further HD plan per nephrology and recommending comfort care.      4. Hyperkalemic, hypernatremic, dehydration   Improved.      5. Acute hypoxic Respiratory failure  Extubated to trache collar. PCCM on board, transferred to tele.      6. Coronary artery disease   Status post CABG.  No acute issue.      7. Decubital ulcer -PROVIDENCIA STUARTII    Also sensitive to meropenem no debridement planned per surgery.      8. Anemia   Possibly from chronic disease monitor. Status post 1 unit PRBC transfusion.      9.  Severe Protein Calorie Malnutrition    Patient with overall poor prognosis, wife kept asking whether HD would help, and also wants to get opinion from patient's own nephrologist. In my opinion, patient would be a perfect candidate for comfort care at this point given overall poor status. Will request palliative care for evaluation. Code status: DNR  DVT prophylaxis: On heparin    Care Plan discussed with: Patient/Family  Disposition: TBD        Hospital Problems  Never Reviewed          Codes Class Noted POA    UTI (urinary tract infection) due to urinary indwelling catheter [facility]) ICD-10-CM: T83.511A, N39.0  ICD-9-CM: 996.64, 599.0  8/22/2017 Unknown        Sepsis affecting skin ICD-10-CM: L02.91  ICD-9-CM: 682.9  8/22/2017 Unknown                Review of Systems:   Pertinent items are noted in HPI. Vital Signs:    Last 24hrs VS reviewed since prior progress note. Most recent are:  Visit Vitals    /59    Pulse 88    Temp 97.9 °F (36.6 °C)    Resp 22    Ht 6' (1.829 m)    Wt 63.4 kg (139 lb 12.4 oz)    SpO2 100%    BMI 18.96 kg/m2         Intake/Output Summary (Last 24 hours) at 08/28/17 1202  Last data filed at 08/28/17 1147   Gross per 24 hour   Intake             2100 ml   Output              722 ml   Net             1378 ml        Physical Examination:             Constitutional:  Reamins drowsy but easily awakened. Frail looking   ENT:  Oral mucous moist, oropharynx benign. Neck supple,    Resp:  CTA bilaterally. No wheezing/rhonchi/rales. No accessory muscle use   CV:  Regular rhythm, normal rate, no murmurs, gallops, rubs    GI:  Soft, non distended, non tender. normoactive bowel sounds, no hepatosplenomegaly     Musculoskeletal:  No edema, warm, 2+ pulses throughout    Neurologic:  Drowsy     Eyes:  EOMI. Anicteric sclerae, PERRL.        Data Review:    Review and/or order of clinical lab test      Labs:     Recent Labs      08/28/17   0357  08/27/17   0450   WBC  9.1  10.1   HGB  8.0*  8.1*   HCT  24.6* 25.4*   PLT  196  185     Recent Labs      08/28/17   0357  08/27/17   0450  08/26/17   0404   NA  135*  135*  135*   K  3.3*  3.2*  3.0*   CL  98  98  99   CO2  26  25  27   BUN  49*  46*  36*   CREA  4.06*  3.49*  2.64*   GLU  119*  132*  128*   CA  7.4*  7.7*  7.2*   MG   --   1.8   --    PHOS   --   4.7   --      No results for input(s): SGOT, GPT, ALT, AP, TBIL, TBILI, TP, ALB, GLOB, GGT, AML, LPSE in the last 72 hours. No lab exists for component: AMYP, HLPSE  No results for input(s): INR, PTP, APTT in the last 72 hours. No lab exists for component: INREXT   No results for input(s): FE, TIBC, PSAT, FERR in the last 72 hours. No results found for: FOL, RBCF   No results for input(s): PH, PCO2, PO2 in the last 72 hours. No results for input(s): CPK, CKNDX, TROIQ in the last 72 hours.     No lab exists for component: CPKMB  No results found for: CHOL, CHOLX, CHLST, CHOLV, HDL, LDL, LDLC, DLDLP, TGLX, TRIGL, TRIGP, CHHD, CHHDX  Lab Results   Component Value Date/Time    Glucose (POC) 98 08/25/2017 04:37 AM    Glucose (POC) 90 08/24/2017 09:10 PM    Glucose (POC) 83 08/24/2017 02:12 PM    Glucose (POC) 120 08/24/2017 05:48 AM    Glucose (POC) 77 08/24/2017 05:34 AM     Lab Results   Component Value Date/Time    Color YELLOW/STRAW 08/22/2017 12:14 PM    Appearance TURBID 08/22/2017 12:14 PM    Specific gravity 1.010 08/22/2017 12:14 PM    pH (UA) 7.0 08/22/2017 12:14 PM    Protein 100 08/22/2017 12:14 PM    Glucose NEGATIVE  08/22/2017 12:14 PM    Ketone NEGATIVE  08/22/2017 12:14 PM    Bilirubin NEGATIVE  08/22/2017 12:14 PM    Urobilinogen 0.2 08/22/2017 12:14 PM    Nitrites NEGATIVE  08/22/2017 12:14 PM    Leukocyte Esterase LARGE 08/22/2017 12:14 PM    Epithelial cells FEW 08/22/2017 12:14 PM    Bacteria 4+ 08/22/2017 12:14 PM    WBC  08/22/2017 12:14 PM    RBC 20-50 08/22/2017 12:14 PM         Medications Reviewed:     Current Facility-Administered Medications   Medication Dose Route Frequency    alteplase (CATHFLO) 1 mg in sterile water (preservative free) 1 mL injection  1 mg InterCATHeter PRN    bacitracin 500 unit/gram packet 1 Packet  1 Packet Topical PRN    sodium chloride (NS) flush 10-30 mL  10-30 mL InterCATHeter PRN    sodium chloride (NS) flush 10 mL  10 mL InterCATHeter Q24H    sodium chloride (NS) flush 10 mL  10 mL InterCATHeter PRN    sodium chloride (NS) flush 10-40 mL  10-40 mL InterCATHeter Q8H    sodium chloride (NS) flush 20 mL  20 mL InterCATHeter Q24H    potassium chloride 10 mEq in 50 ml IVPB  10 mEq IntraVENous Q1H    meropenem (MERREM) 500 mg in 0.9% sodium chloride (MBP/ADV) 50 mL  0.5 g IntraVENous Q12H    dextrose (D50W) injection syrg 25 g  25 g IntraVENous PRN    0.9% sodium chloride infusion 250 mL  250 mL IntraVENous PRN    fentaNYL citrate (PF) injection 25 mcg  25 mcg IntraVENous Q1H PRN    gelatin adsorbable (GELFOAM) 12-7 mm sponge   Topical PRN    sodium chloride (NS) flush 5-10 mL  5-10 mL IntraVENous Q8H    sodium chloride (NS) flush 5-10 mL  5-10 mL IntraVENous PRN    acetaminophen (TYLENOL) tablet 650 mg  650 mg Oral Q4H PRN    HYDROcodone-acetaminophen (NORCO) 5-325 mg per tablet 1 Tab  1 Tab Oral Q4H PRN    ondansetron (ZOFRAN ODT) tablet 4 mg  4 mg Oral Q4H PRN    heparin (porcine) injection 5,000 Units  5,000 Units SubCUTAneous Q8H    acetaminophen (TYLENOL) suppository 650 mg  650 mg Rectal Q4H PRN    albuterol-ipratropium (DUO-NEB) 2.5 MG-0.5 MG/3 ML  3 mL Nebulization Q6H RT    aspirin chewable tablet 81 mg  81 mg Per G Tube DAILY    pantoprazole (PROTONIX) 2 mg/mL oral suspension 40 mg  40 mg PEG Tube ACB    collagenase (SANTYL) 250 unit/gram ointment   Topical DAILY     ______________________________________________________________________  EXPECTED LENGTH OF STAY: 4d 21h  ACTUAL LENGTH OF STAY:          6                 Jolie Nieves MD

## 2017-08-29 LAB
ANION GAP SERPL CALC-SCNC: 11 MMOL/L (ref 5–15)
BASOPHILS # BLD: 0 K/UL (ref 0–0.1)
BASOPHILS NFR BLD: 0 % (ref 0–1)
BUN SERPL-MCNC: 52 MG/DL (ref 6–20)
BUN/CREAT SERPL: 11 (ref 12–20)
CALCIUM SERPL-MCNC: 7.4 MG/DL (ref 8.5–10.1)
CHLORIDE SERPL-SCNC: 98 MMOL/L (ref 97–108)
CO2 SERPL-SCNC: 26 MMOL/L (ref 21–32)
CREAT SERPL-MCNC: 4.57 MG/DL (ref 0.7–1.3)
EOSINOPHIL # BLD: 1 K/UL (ref 0–0.4)
EOSINOPHIL NFR BLD: 11 % (ref 0–7)
ERYTHROCYTE [DISTWIDTH] IN BLOOD BY AUTOMATED COUNT: 16.1 % (ref 11.5–14.5)
GLUCOSE BLD STRIP.AUTO-MCNC: 162 MG/DL (ref 65–100)
GLUCOSE SERPL-MCNC: 199 MG/DL (ref 65–100)
HCT VFR BLD AUTO: 23.4 % (ref 36.6–50.3)
HGB BLD-MCNC: 7.5 G/DL (ref 12.1–17)
LYMPHOCYTES # BLD: 1.1 K/UL (ref 0.8–3.5)
LYMPHOCYTES NFR BLD: 13 % (ref 12–49)
MCH RBC QN AUTO: 27.5 PG (ref 26–34)
MCHC RBC AUTO-ENTMCNC: 32.1 G/DL (ref 30–36.5)
MCV RBC AUTO: 85.7 FL (ref 80–99)
MONOCYTES # BLD: 0.5 K/UL (ref 0–1)
MONOCYTES NFR BLD: 6 % (ref 5–13)
NEUTS SEG # BLD: 6.1 K/UL (ref 1.8–8)
NEUTS SEG NFR BLD: 70 % (ref 32–75)
PLATELET # BLD AUTO: 187 K/UL (ref 150–400)
POTASSIUM SERPL-SCNC: 3.8 MMOL/L (ref 3.5–5.1)
RBC # BLD AUTO: 2.73 M/UL (ref 4.1–5.7)
SERVICE CMNT-IMP: ABNORMAL
SODIUM SERPL-SCNC: 135 MMOL/L (ref 136–145)
WBC # BLD AUTO: 8.7 K/UL (ref 4.1–11.1)

## 2017-08-29 PROCEDURE — 77030018798 HC PMP KT ENTRL FED COVD -A

## 2017-08-29 PROCEDURE — 77030018836 HC SOL IRR NACL ICUM -A

## 2017-08-29 PROCEDURE — 74011250636 HC RX REV CODE- 250/636: Performed by: INTERNAL MEDICINE

## 2017-08-29 PROCEDURE — 74011000258 HC RX REV CODE- 258: Performed by: SURGERY

## 2017-08-29 PROCEDURE — 94640 AIRWAY INHALATION TREATMENT: CPT

## 2017-08-29 PROCEDURE — 74011250636 HC RX REV CODE- 250/636: Performed by: SURGERY

## 2017-08-29 PROCEDURE — 80048 BASIC METABOLIC PNL TOTAL CA: CPT | Performed by: FAMILY MEDICINE

## 2017-08-29 PROCEDURE — 74011000250 HC RX REV CODE- 250: Performed by: INTERNAL MEDICINE

## 2017-08-29 PROCEDURE — 65660000000 HC RM CCU STEPDOWN

## 2017-08-29 PROCEDURE — 82962 GLUCOSE BLOOD TEST: CPT

## 2017-08-29 PROCEDURE — 74011250637 HC RX REV CODE- 250/637: Performed by: INTERNAL MEDICINE

## 2017-08-29 PROCEDURE — 77010033678 HC OXYGEN DAILY

## 2017-08-29 PROCEDURE — 85025 COMPLETE CBC W/AUTO DIFF WBC: CPT | Performed by: FAMILY MEDICINE

## 2017-08-29 PROCEDURE — 36415 COLL VENOUS BLD VENIPUNCTURE: CPT | Performed by: FAMILY MEDICINE

## 2017-08-29 RX ADMIN — ASPIRIN 81 MG 81 MG: 81 TABLET ORAL at 08:40

## 2017-08-29 RX ADMIN — Medication 10 ML: at 21:51

## 2017-08-29 RX ADMIN — Medication 10 ML: at 14:16

## 2017-08-29 RX ADMIN — PANTOPRAZOLE SODIUM 40 MG: 40 TABLET, DELAYED RELEASE ORAL at 07:14

## 2017-08-29 RX ADMIN — HEPARIN SODIUM 5000 UNITS: 5000 INJECTION, SOLUTION INTRAVENOUS; SUBCUTANEOUS at 07:14

## 2017-08-29 RX ADMIN — COLLAGENASE SANTYL: 250 OINTMENT TOPICAL at 08:40

## 2017-08-29 RX ADMIN — Medication 10 ML: at 10:06

## 2017-08-29 RX ADMIN — Medication 10 ML: at 05:21

## 2017-08-29 RX ADMIN — IPRATROPIUM BROMIDE AND ALBUTEROL SULFATE 3 ML: .5; 3 SOLUTION RESPIRATORY (INHALATION) at 09:20

## 2017-08-29 RX ADMIN — HEPARIN SODIUM 5000 UNITS: 5000 INJECTION, SOLUTION INTRAVENOUS; SUBCUTANEOUS at 22:32

## 2017-08-29 RX ADMIN — Medication 10 ML: at 05:22

## 2017-08-29 RX ADMIN — IPRATROPIUM BROMIDE AND ALBUTEROL SULFATE 3 ML: .5; 3 SOLUTION RESPIRATORY (INHALATION) at 01:00

## 2017-08-29 RX ADMIN — MEROPENEM 500 MG: 500 INJECTION, POWDER, FOR SOLUTION INTRAVENOUS at 21:51

## 2017-08-29 RX ADMIN — IPRATROPIUM BROMIDE AND ALBUTEROL SULFATE 3 ML: .5; 3 SOLUTION RESPIRATORY (INHALATION) at 13:05

## 2017-08-29 RX ADMIN — IPRATROPIUM BROMIDE AND ALBUTEROL SULFATE 3 ML: .5; 3 SOLUTION RESPIRATORY (INHALATION) at 21:07

## 2017-08-29 RX ADMIN — MEROPENEM 500 MG: 500 INJECTION, POWDER, FOR SOLUTION INTRAVENOUS at 10:01

## 2017-08-29 RX ADMIN — HEPARIN SODIUM 5000 UNITS: 5000 INJECTION, SOLUTION INTRAVENOUS; SUBCUTANEOUS at 14:15

## 2017-08-29 NOTE — PROGRESS NOTES
Plans noted  Will see as needed  Thank you for allowing [facility] to participate in Rosemary Proctor's care. We will see again as needed.

## 2017-08-29 NOTE — PROGRESS NOTES
Physical Therapy Screening:    An [facility] screening referral was triggered for physical therapy based on results obtained during the nursing admission assessment. The patients chart was reviewed and the patient is appropriate for a skilled therapy evaluation if there is a decline in functional mobility from baseline. Please order a consult for physical therapy if you are in agreement and would like an evaluation to be completed. Thank you.     Ruma Leyva, PT, DPT

## 2017-08-29 NOTE — PROGRESS NOTES
Problem: Falls - Risk of  Goal: *Absence of Falls  Document James Fall Risk and appropriate interventions in the flowsheet. Outcome: Progressing Towards Goal  Fall Risk Interventions:        Mentation Interventions: Door open when patient unattended, Evaluate medications/consider consulting pharmacy, More frequent rounding, Toileting rounds     Medication Interventions: Evaluate medications/consider consulting pharmacy     Elimination Interventions: Call light in reach, Toileting schedule/hourly rounds                 Problem: Pressure Injury - Risk of  Goal: *Prevention of pressure ulcer  Outcome: Not Progressing Towards Goal  Multiple pressure injuries-PTA. Problem: Nutrition Deficit  Goal: *Optimize nutritional status  Outcome: Progressing Towards Goal  Tolerating TF at goal.    Problem: Urinary Tract Infection - Adult  Goal: *Absence of infection signs and symptoms  Outcome: Progressing Towards Goal  WBC trending down.

## 2017-08-29 NOTE — PROGRESS NOTES
Assumed care of pt this afternoon from ICU. Pt is alert but aphasic with no known needs at this time.

## 2017-08-29 NOTE — PROGRESS NOTES
RENAL  PROGRESS NOTE        Subjective:    COMPLAINT: extubated, non verbal.     Objective:   VITALS SIGNS:    Visit Vitals    /57    Pulse 92    Temp 98.2 °F (36.8 °C)    Resp 19    Ht 6' (1.829 m)    Wt 61.9 kg (136 lb 7.4 oz)    SpO2 100%    BMI 18.51 kg/m2       O2 Device: Tracheal collar   O2 Flow Rate (L/min): 7 l/min   Temp (24hrs), Av.2 °F (36.8 °C), Min:97.9 °F (36.6 °C), Max:98.5 °F (36.9 °C)         PHYSICAL EXAM:  No edema  Open eyes  abd soft    DATA REVIEW:     INTAKE / OUTPUT:   Last shift:      701 - 1900  In: 210   Out: -   Last 3 shifts: 1901 -  07  In: 2303 [I.V.:350]  Out: 952 [Urine:952]    Intake/Output Summary (Last 24 hours) at 17 1131  Last data filed at 17 0800   Gross per 24 hour   Intake             2300 ml   Output              460 ml   Net             1840 ml         LABS:   Recent Labs      17   0510  17   0357  17   0450   WBC  8.7  9.1  10.1   HGB  7.5*  8.0*  8.1*   HCT  23.4*  24.6*  25.4*   PLT  187  196  185     Recent Labs      17   0510  17   0357  17   0450   NA  135*  135*  135*   K  3.8  3.3*  3.2*   CL  98  98  98   CO2  26  26  25   GLU  199*  119*  132*   BUN  52*  49*  46*   CREA  4.57*  4.06*  3.49*   CA  7.4*  7.4*  7.7*   MG   --    --   1.8   PHOS   --    --   4.7           Assessment:   FREDERICK, likely septic ATN/hypotension with chronic gutierrez, s.p urgent dialysis on 17 and again on 17  kimberly on 17  CKD ? Baseline  prostate CA as per wife  Severe hyperkalemia, resolved s.p urgent dialysis  Septic shock;gram neg rods bacteremia likely from urospesis  VDRF  Cachexia  Anemia as per first team  Plan:      As per discussion with wife yesterday - no HD and no escalation of care.          Owen Staley MD

## 2017-08-29 NOTE — PROGRESS NOTES
Problem: Nutrition Deficit  Goal: *Optimize nutritional status  Outcome: Progressing Towards Goal  Tolerating TF

## 2017-08-29 NOTE — PROGRESS NOTES
NUTRITION FOLLOW UP    RECOMMENDATIONS:   1. If consistent with pt care goals resume tube feeds via PEG:   - Suplena @ 38ml/hr + 160ml q4hr (or adjusted per renal)    2. Monitor lytes, edema and respiratory status  3. Daily weights      Interventions/Plan:   Food/Nutrient Delivery:          Modify rate, concentration, composition, and schedule (reduce rate to match level of care), provide minimal needs to prevent over taxing kidneys. May tolerate protein supplement via PEG if labs stabilize this week. Assessment:   Reason for Assessment: [x] follow-up    Diet: NPO  Nutritionally Significant Medications: [x] Reviewed & Includes: albumin, levaquin, merrem, protonix, levophed    Pre-Hospitalization:  Diet at Home: npo (with PEG)    Subjective:  N/a. Wife resting at bedside at time of visit. Objective:  Pt admitted for sepsis from [facility]. PMHx: CAD, DM, hyperlipidemia, ventilation w/ tracheostomy and PEG (6/2017). Sepsis with abx rx, pressors rx.  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 FREDERICK on CKD with hyperkalemia and need for emergent HD 8/22 - renal following. Of note: pt on Hospice PTA and renal recommending continuation with this since poor HD candidate. Seen by Palliative care with plans to continue with current care with decision regarding HD as needed. 8/29: Spoke w/ RN who notes wife considering Hospice/pallitaive however does not want to stop all care, Nephrology not moving forward with continued HD since pt is not a candidate for long-term HD. Pt is not expected to recover however wife has expressed the desire to continue current measures without escalation of care. Tube feeds will be restarted once pt is transferred to tele-unit. With renal status poor will continue Suplena for now at a reduced rate given reduction in aggressiveness in care. Suplena @ 38ml/hr + 160ml q4hr (or adjusted per renal).  [912ml, 1642kcal, 40g protein, 654mL fluid + 960mL flush = 1614mL fluid.]     No wt hx available but only 76% IBW, with temporal and clavicular wasting. Meets criteria for malnutrition. Wt Readings from Last 10 Encounters:   08/29/17 61.9 kg (136 lb 7.4 oz)     Meets Criteria for Chronic Malnutrition   [x] Severe Malnutrition, as evidenced by:   [x] Severe muscle wasting   [x]  Severe loss of subcutaneous fat   []  Nutritional intake of <75% of recommended intake for >1 month   [] Weight loss of  >5% in 1 month, >7.5% in 3 months, >10% in 6 months, >20% in 1 year    [] Severe edema     Will continue to follow for plan of care, restart of tube feeds, electrolytes and wt. Estimated Nutrition Needs:   Kcals/day: 1550 Kcals/day (-1860 (25-30 kcal/kg of current wt))  Protein: 49 g (0.8 g/kg of current wt)  Fluid: 1550 ml (25 mL/kg of current wt)  Based On: Kcal/kg - specify (Comment) (25-30 kcal/kg of current wt)  Weight Used: Actual wt (61.9 kg)    Pt expected to meet estimated nutrient needs:  []   Yes     [x]  No (without feeds) [] Unable to predict at this time    Nutrition Diagnosis:   1. Less than optimal enteral nutrition related to renal failure without additional HD as evidenced by tach with PEG PTA, failure to recover from renal failure & inability to tolerate long term HD    2. (Increased energy needs) related to underweight, malnutrition as evidenced by 76% IBW, cachexia, cancer with mets, advanced age. 3. Altered nutrition-related lab values (Decreased protein/Phos/K+ needs) related to FREDERICK on CKD as evidenced by emergent HD, elevated lab values, unable to continue HD treatments, worsening kidney function.      Goals:     EN to meet 80% of energy and protein needs within 2-3 days,  provide Nutrition Support as desired by family/patient     Monitoring & Evaluation:    - Enteral/parenteral nutrition intake   - Protein profile, Nutritional anemia profile    Previous Nutrition Goals Met:   Yes    Previous Recommendations:    Yes    Education & Discharge Needs:   [x] None Identified   [] Identified and addressed    [] Participated in care plan, discharge planning, and/or interdisciplinary rounds        Cultural, Bahai and ethnic food preferences identified: None    Skin Integrity: []Intact  [x]Other: unstageable chest; stage 3 sacral  Edema: []None [x]Other: trace  Last BM: 8/27  ABD: flat, active  Food Allergies: [x]None []Other  Diet Restrictions: Cultural/Christian Preference(s): None     Anthropometrics:    Weight Loss Metrics 8/29/2017   Today's Wt 136 lb 7.4 oz   BMI 18.51 kg/m2      Weight Source: Bed  Height: 6' (182.9 cm),    Body mass index is 18.51 kg/(m^2).   IBW : 80.7 kg (178 lb), % IBW (Calculated): 76.79 %   ,      Labs:    Lab Results   Component Value Date/Time    Sodium 135 08/29/2017 05:10 AM    Potassium 3.8 08/29/2017 05:10 AM    Chloride 98 08/29/2017 05:10 AM    CO2 26 08/29/2017 05:10 AM    Glucose 199 08/29/2017 05:10 AM    BUN 52 08/29/2017 05:10 AM    Creatinine 4.57 08/29/2017 05:10 AM    Calcium 7.4 08/29/2017 05:10 AM    Magnesium 1.8 08/27/2017 04:50 AM    Phosphorus 4.7 08/27/2017 04:50 AM    Albumin 1.7 08/22/2017 10:00 AM     Aj Lundy, RD, MS, CDE

## 2017-08-29 NOTE — PROGRESS NOTES
Care Management: noted order for Hospice. Hospice referral placed. To follow transition of care. Attempted to meet with wife and no family members present at this time. Will follow transition of care.   Rony Gibbs RN BSN CM

## 2017-08-29 NOTE — PROGRESS NOTES
Hospitalist Progress Note  Taco Chan MD  Office: [phone]  Cell:       Date of Service:  2017  NAME:  Ines Corey  :  11/10/1933  MRN:  [id number]      Admission Summary:     The patient is an 51-year-old patient who originally was a resident of [place], [place]. He   was admitted in [facility] on 2017 due to hypotension, hyperventilation and confusion.  Since the patient was unable to be extubated while in the care in the ICU, then tracheostomy and PEG was done and the patient was transferred to a long term care facility   that was in [place]. his morning the patient continued with shortness of breath, for which we will transfer the patient to our ER. While in our ER, the patient was connected to the ventilator through the tracheostomy.      Interval history / Subjective:   Mr.Frank Elizabeth Neal seen in room 669,transfered from ICU today. He is awake and responsive. Denied any pain. Wife at bedside. Assessment & Plan:     Sepsis with septic shock  UTI with ESBL E coli, as well as E coli bacteremmia, shock has improved, but borderline low BP. Continuing Merropenem.     Bacteremia with ESBL e coli  On meropenem. May need weeks of antibiotics depending on progress    ESBL E coli pneumonia: mx as above.      Acute and chronic renal failure,due to hypotension from sepsis/ATN  Status post 2 HD, but no further HD plan per nephrology and recommending comfort care.      Hyperkalemic, hypernatremic, dehydration   Improved.      Acute hypoxic Respiratory failure  Extubated to trache collar.       Coronary artery disease   Status post CABG.  No acute issue.      Decubital ulcer -PROVIDENCIA STUARTII    Also sensitive to meropenem no debridement planned per surgery.      Anemia   Possibly from chronic disease monitor.  Status post 1 unit PRBC transfusion.      Severe Protein Calorie Malnutrition  -On Tube feeding          Code status: DNR  DVT prophylaxis: On heparin    Care Plan discussed with: Patient/Family  Disposition:depending on clinical progress,may need SNF       Hospital Problems  Never Reviewed          Codes Class Noted POA    UTI (urinary tract infection) due to urinary indwelling catheter [facility]) ICD-10-CM: T83.511A, N39.0  ICD-9-CM: 996.64, 599.0  8/22/2017 Unknown        Sepsis affecting skin ICD-10-CM: L02.91  ICD-9-CM: 682.9  8/22/2017 Unknown                Review of Systems:   Pertinent items are noted in HPI. Vital Signs:    Last 24hrs VS reviewed since prior progress note. Most recent are:  Visit Vitals    /59    Pulse 86    Temp 98.1 °F (36.7 °C)    Resp 24    Ht 6' (1.829 m)    Wt 61.9 kg (136 lb 7.4 oz)    SpO2 100%    BMI 18.51 kg/m2         Intake/Output Summary (Last 24 hours) at 08/29/17 1757  Last data filed at 08/29/17 1400   Gross per 24 hour   Intake             2040 ml   Output              425 ml   Net             1615 ml        Physical Examination:             Constitutional: Awake,flat. Not in distress   ENT:  Trach collar in place. Right IJ in place. Resp:  CTA bilaterally. No wheezing/rhonchi/rales. No accessory muscle use   CV:  Regular rhythm, normal rate, no murmurs, gallops, rubs    GI:  Soft, non distended, non tender. normoactive bowel sounds, no hepatosplenomegaly   PEG in place. Musculoskeletal:  No edema, warm, 2+ pulses throughout    Neurologic: Awake,flat     Eyes:  EOMI. Anicteric sclerae, PERRL.        Data Review:    Review and/or order of clinical lab test      Labs:     Recent Labs      08/29/17   0510  08/28/17   0357   WBC  8.7  9.1   HGB  7.5*  8.0*   HCT  23.4*  24.6*   PLT  187  196     Recent Labs      08/29/17   0510  08/28/17   0357  08/27/17   0450   NA  135*  135*  135*   K  3.8  3.3*  3.2*   CL  98  98  98   CO2  26  26  25   BUN  52*  49*  46*   CREA  4.57*  4.06*  3.49*   GLU  199*  119*  132*   CA  7.4*  7.4*  7.7*   MG   -- --   1.8   PHOS   --    --   4.7     No results for input(s): SGOT, GPT, ALT, AP, TBIL, TBILI, TP, ALB, GLOB, GGT, AML, LPSE in the last 72 hours. No lab exists for component: AMYP, HLPSE  No results for input(s): INR, PTP, APTT in the last 72 hours. No lab exists for component: INREXT, INREXT   No results for input(s): FE, TIBC, PSAT, FERR in the last 72 hours. No results found for: FOL, RBCF   No results for input(s): PH, PCO2, PO2 in the last 72 hours. No results for input(s): CPK, CKNDX, TROIQ in the last 72 hours.     No lab exists for component: CPKMB  No results found for: CHOL, CHOLX, CHLST, CHOLV, HDL, LDL, LDLC, DLDLP, TGLX, TRIGL, TRIGP, CHHD, CHHDX  Lab Results   Component Value Date/Time    Glucose (POC) 162 08/29/2017 05:23 PM    Glucose (POC) 98 08/25/2017 04:37 AM    Glucose (POC) 90 08/24/2017 09:10 PM    Glucose (POC) 83 08/24/2017 02:12 PM    Glucose (POC) 120 08/24/2017 05:48 AM     Lab Results   Component Value Date/Time    Color YELLOW/STRAW 08/22/2017 12:14 PM    Appearance TURBID 08/22/2017 12:14 PM    Specific gravity 1.010 08/22/2017 12:14 PM    pH (UA) 7.0 08/22/2017 12:14 PM    Protein 100 08/22/2017 12:14 PM    Glucose NEGATIVE  08/22/2017 12:14 PM    Ketone NEGATIVE  08/22/2017 12:14 PM    Bilirubin NEGATIVE  08/22/2017 12:14 PM    Urobilinogen 0.2 08/22/2017 12:14 PM    Nitrites NEGATIVE  08/22/2017 12:14 PM    Leukocyte Esterase LARGE 08/22/2017 12:14 PM    Epithelial cells FEW 08/22/2017 12:14 PM    Bacteria 4+ 08/22/2017 12:14 PM    WBC  08/22/2017 12:14 PM    RBC 20-50 08/22/2017 12:14 PM         Medications Reviewed:     Current Facility-Administered Medications   Medication Dose Route Frequency    [START ON 8/30/2017] pantoprazole (PROTONIX) 2 mg/mL oral suspension 40 mg  40 mg PEG Tube ACB    alteplase (CATHFLO) 1 mg in sterile water (preservative free) 1 mL injection  1 mg InterCATHeter PRN    bacitracin 500 unit/gram packet 1 Packet  1 Packet Topical PRN    sodium chloride (NS) flush 10-30 mL  10-30 mL InterCATHeter PRN    sodium chloride (NS) flush 10 mL  10 mL InterCATHeter Q24H    sodium chloride (NS) flush 10 mL  10 mL InterCATHeter PRN    sodium chloride (NS) flush 10-40 mL  10-40 mL InterCATHeter Q8H    sodium chloride (NS) flush 20 mL  20 mL InterCATHeter Q24H    meropenem (MERREM) 500 mg in 0.9% sodium chloride (MBP/ADV) 50 mL  0.5 g IntraVENous Q12H    dextrose (D50W) injection syrg 25 g  25 g IntraVENous PRN    0.9% sodium chloride infusion 250 mL  250 mL IntraVENous PRN    fentaNYL citrate (PF) injection 25 mcg  25 mcg IntraVENous Q1H PRN    gelatin adsorbable (GELFOAM) 12-7 mm sponge   Topical PRN    sodium chloride (NS) flush 5-10 mL  5-10 mL IntraVENous Q8H    sodium chloride (NS) flush 5-10 mL  5-10 mL IntraVENous PRN    acetaminophen (TYLENOL) tablet 650 mg  650 mg Oral Q4H PRN    HYDROcodone-acetaminophen (NORCO) 5-325 mg per tablet 1 Tab  1 Tab Oral Q4H PRN    ondansetron (ZOFRAN ODT) tablet 4 mg  4 mg Oral Q4H PRN    heparin (porcine) injection 5,000 Units  5,000 Units SubCUTAneous Q8H    acetaminophen (TYLENOL) suppository 650 mg  650 mg Rectal Q4H PRN    albuterol-ipratropium (DUO-NEB) 2.5 MG-0.5 MG/3 ML  3 mL Nebulization Q6H RT    aspirin chewable tablet 81 mg  81 mg Per G Tube DAILY    collagenase (SANTYL) 250 unit/gram ointment   Topical DAILY     ______________________________________________________________________  EXPECTED LENGTH OF STAY: 4d 21h  ACTUAL LENGTH OF STAY:          7                 Collette Hamburg, MD

## 2017-08-30 LAB
ANION GAP SERPL CALC-SCNC: 12 MMOL/L (ref 5–15)
BUN SERPL-MCNC: 55 MG/DL (ref 6–20)
BUN/CREAT SERPL: 11 (ref 12–20)
CALCIUM SERPL-MCNC: 7.7 MG/DL (ref 8.5–10.1)
CHLORIDE SERPL-SCNC: 97 MMOL/L (ref 97–108)
CO2 SERPL-SCNC: 24 MMOL/L (ref 21–32)
CREAT SERPL-MCNC: 5.14 MG/DL (ref 0.7–1.3)
GLUCOSE BLD STRIP.AUTO-MCNC: 137 MG/DL (ref 65–100)
GLUCOSE SERPL-MCNC: 123 MG/DL (ref 65–100)
POTASSIUM SERPL-SCNC: 3.7 MMOL/L (ref 3.5–5.1)
SERVICE CMNT-IMP: ABNORMAL
SODIUM SERPL-SCNC: 133 MMOL/L (ref 136–145)

## 2017-08-30 PROCEDURE — 36415 COLL VENOUS BLD VENIPUNCTURE: CPT | Performed by: HOSPITALIST

## 2017-08-30 PROCEDURE — 65660000000 HC RM CCU STEPDOWN

## 2017-08-30 PROCEDURE — 74011250636 HC RX REV CODE- 250/636: Performed by: INTERNAL MEDICINE

## 2017-08-30 PROCEDURE — 74011250636 HC RX REV CODE- 250/636: Performed by: SURGERY

## 2017-08-30 PROCEDURE — 74011000258 HC RX REV CODE- 258: Performed by: SURGERY

## 2017-08-30 PROCEDURE — 74011250637 HC RX REV CODE- 250/637: Performed by: INTERNAL MEDICINE

## 2017-08-30 PROCEDURE — 77030006998

## 2017-08-30 PROCEDURE — 87040 BLOOD CULTURE FOR BACTERIA: CPT | Performed by: HOSPITALIST

## 2017-08-30 PROCEDURE — 80048 BASIC METABOLIC PNL TOTAL CA: CPT | Performed by: HOSPITALIST

## 2017-08-30 PROCEDURE — 82962 GLUCOSE BLOOD TEST: CPT

## 2017-08-30 PROCEDURE — 74011000250 HC RX REV CODE- 250: Performed by: INTERNAL MEDICINE

## 2017-08-30 PROCEDURE — 94640 AIRWAY INHALATION TREATMENT: CPT

## 2017-08-30 RX ADMIN — Medication 10 ML: at 22:43

## 2017-08-30 RX ADMIN — HEPARIN SODIUM 5000 UNITS: 5000 INJECTION, SOLUTION INTRAVENOUS; SUBCUTANEOUS at 07:22

## 2017-08-30 RX ADMIN — Medication 10 ML: at 07:22

## 2017-08-30 RX ADMIN — Medication 10 ML: at 14:00

## 2017-08-30 RX ADMIN — HEPARIN SODIUM 5000 UNITS: 5000 INJECTION, SOLUTION INTRAVENOUS; SUBCUTANEOUS at 14:55

## 2017-08-30 RX ADMIN — Medication 20 ML: at 14:00

## 2017-08-30 RX ADMIN — Medication 20 ML: at 12:00

## 2017-08-30 RX ADMIN — COLLAGENASE SANTYL: 250 OINTMENT TOPICAL at 09:37

## 2017-08-30 RX ADMIN — HEPARIN SODIUM 5000 UNITS: 5000 INJECTION, SOLUTION INTRAVENOUS; SUBCUTANEOUS at 22:43

## 2017-08-30 RX ADMIN — ASPIRIN 81 MG 81 MG: 81 TABLET ORAL at 09:37

## 2017-08-30 RX ADMIN — IPRATROPIUM BROMIDE AND ALBUTEROL SULFATE 3 ML: .5; 3 SOLUTION RESPIRATORY (INHALATION) at 07:47

## 2017-08-30 RX ADMIN — PANTOPRAZOLE SODIUM 40 MG: 40 TABLET, DELAYED RELEASE ORAL at 07:22

## 2017-08-30 RX ADMIN — IPRATROPIUM BROMIDE AND ALBUTEROL SULFATE 3 ML: .5; 3 SOLUTION RESPIRATORY (INHALATION) at 13:27

## 2017-08-30 RX ADMIN — MEROPENEM 500 MG: 500 INJECTION, POWDER, FOR SOLUTION INTRAVENOUS at 22:42

## 2017-08-30 RX ADMIN — IPRATROPIUM BROMIDE AND ALBUTEROL SULFATE 3 ML: .5; 3 SOLUTION RESPIRATORY (INHALATION) at 01:33

## 2017-08-30 RX ADMIN — MEROPENEM 500 MG: 500 INJECTION, POWDER, FOR SOLUTION INTRAVENOUS at 09:37

## 2017-08-30 RX ADMIN — Medication 10 ML: at 12:00

## 2017-08-30 NOTE — PROGRESS NOTES
Palliative Medicine Consult  Doron: [phone] ([phone])    Patient Name: Gustave Kanner  YOB: 1933    Date of Initial Consult: 8/23/17  Reason for Consult: Care decisions   Requesting Provider: Nancylee Harada   Primary Care Physician: Elmer Fernandes MD      SUMMARY:   Gustave Kanner is a 80 y.o. with a past history of CAD s/p CABG, HTN, DM, prostate cancer, possible CKD who was admitted on 8/22/2017 from [address] [place] [address] with fever and hypotension. Discussed hx w/ wife. As of April this year, pt and wife were living together in apartment- he was able to do his ADLs w/ some assistance but then had a hospital stay at the [facility], then went to [facility]. Soon afteerwards pt had prolonged hospitalization at [facility] on 6/4/17 w/ sepsis during which time was intubated and sedated. Could not be weaned from vent, s/p trach and PEG. This admission found to have GNR UTI and bacteremia on IV abx, as well as FREDERICK w/ hyperkalemia- s/p emergent dialysis. Noted that pt would be a poor long term dialysis candidate. Current medical issues leading to Palliative Medicine involvement include: care decisions. Apparently had hospice at [facility]), remained full code. At this time goals are clear w/ wife Kiana Diana to cont current measures but no escalation of care. PALLIATIVE DIAGNOSES:   1. Shortness of breath, improved   2. Feeding difficulties, on PEG  3. Mult medical issues as above, sepsis, FREDERICK on CKD  4. Debility   5. Sacral decub      PLAN:   1. Pt has stabilized, but remains very ill- able to mouth words. Denies pain. 2. Wife Kiana Diana at bedside - meet w/ her along w/ Miguel Alvarado LCSW. She associates the fact that pt feels good w/ him recovering and improving. While he is stabilizing, does remain very ill w/ FREDERICK on CKD. 3. Goals clear for recovery as possible, but DNR/allow natural death status, and no escalation of care. 4. Hospice referral sent.  I think pt would be best served w/ hospice at [facility], but if wife wishes I see there are also discussions of SNF if pt cont to be stable. If this occurs, could transition to hospice if declines. 5. Will cont to follow for support. 6. Communicated plan of care with: Palliative IDT; Criss HELTON       GOALS OF CARE / TREATMENT PREFERENCES:   [====Goals of Care====]  GOALS OF CARE:  Patient / health care proxy stated goals: recovery as possible    TREATMENT PREFERENCES:   Code Status: DNR-DDNR on file     Advance Care Planning:  Advance Care Planning 8/28/2017   Patient's Healthcare Decision Maker is: Legal Next of Nilton 69   Primary Decision Maker Name Brian Mehta   Primary Decision Maker Relationship to Patient Spouse   Confirm Advance Directive -   Patient Would Like to Complete Advance Directive -   Does the patient have other document types Do Not Resuscitate       Other:    The palliative care team has discussed with patient / health care proxy about goals of care / treatment preferences for patient.  [====Goals of Care====]         HISTORY:         HPI/SUBJECTIVE:    The patient is:   [] Verbal and participatory  [x] Non-participatory due to: medical condition     Pt w/ eyes open and today mouthing words to some of my questions. Denies pain.      Clinical Pain Assessment (nonverbal scale for severity on nonverbal patients):   [++++ Clinical Pain Assessment++++]  [++++Pain Severity++++]: Pain: 0  [++++Pain Character++++]:   [++++Pain Duration++++]:   [++++Pain Effect++++]:   [++++Pain Factors++++]:   [++++Pain Frequency++++]:   [++++Pain Location++++]:   [++++ Clinical Pain Assessment++++]  Duration: for how long has pt been experiencing pain (e.g., 2 days, 1 month, years)  Frequency: how often pain is an issue (e.g., several times per day, once every few days, constant)     FUNCTIONAL ASSESSMENT:     Palliative Performance Scale (PPS):  PPS: 20       PSYCHOSOCIAL/SPIRITUAL SCREENING:     Advance Care Planning:  Advance Care Planning 8/28/2017   Patient's Healthcare Decision Maker is: Legal Next of Nilton Guzman   Primary Decision Maker Name Lidia Rosen   Primary Decision Maker Relationship to Patient Spouse   Confirm Advance Directive -   Patient Would Like to Complete Advance Directive -   Does the patient have other document types Do Not Resuscitate        Any spiritual / Christianity concerns:  [] Yes /  [x] No    Caregiver Burnout:  [] Yes /  [x] No /  [] No Caregiver Present      Anticipatory grief assessment:   [x] Normal  / [] Maladaptive       ESAS Anxiety:   Cannot obtain due to patient factors    ESAS Depression:   Cannot obtain due to patient factors         REVIEW OF SYSTEMS:     Positive and pertinent negative findings in ROS are noted above in HPI. The following systems were [] reviewed / [x] unable to be reviewed as noted in HPI  Other findings are noted below. Systems: constitutional, ears/nose/mouth/throat, respiratory, gastrointestinal, genitourinary, musculoskeletal, integumentary, neurologic, psychiatric, endocrine. Positive findings noted below. Modified ESAS Completed by: provider   Fatigue: 10 Drowsiness: 10     Pain: 0           Dyspnea: 0     Constipation: No     Stool Occurrence(s): 1        PHYSICAL EXAM:     From RN flowsheet:  Wt Readings from Last 3 Encounters:   08/30/17 152 lb 1.9 oz (69 kg)     Blood pressure 99/52, pulse 94, temperature 99 °F (37.2 °C), resp. rate 21, height 6' (1.829 m), weight 152 lb 1.9 oz (69 kg), SpO2 96 %.     Pain Scale 1: Adult Nonverbal Pain Scale  Pain Intensity 1: 0                 Constitutional: pale, chronically ill appearing, temporal wasting   Eyes: pupils equal, anicteric  ENMT: no nasal discharge, moist mucous membranes  Respiratory: breathing not labored, symmetric, trach   Gastrointestinal: soft, PEG  Musculoskeletal: no deformity, no tenderness to palpation  Skin: sacral decub, did not examine   Neurologic: opens his eyes to voice and mouths words       HISTORY: Active Problems:    UTI (urinary tract infection) due to urinary indwelling catheter ([facility] (8/22/2017)      Sepsis affecting skin (8/22/2017)      Past Medical History:   Diagnosis Date    CAD (coronary artery disease)     Hx of CABG       History reviewed. No pertinent surgical history. History reviewed. No pertinent family history. History reviewed, no pertinent family history.   Social History   Substance Use Topics    Smoking status: Not on file    Smokeless tobacco: Not on file    Alcohol use Not on file     No Known Allergies   Current Facility-Administered Medications   Medication Dose Route Frequency    pantoprazole (PROTONIX) 2 mg/mL oral suspension 40 mg  40 mg PEG Tube ACB    alteplase (CATHFLO) 1 mg in sterile water (preservative free) 1 mL injection  1 mg InterCATHeter PRN    bacitracin 500 unit/gram packet 1 Packet  1 Packet Topical PRN    sodium chloride (NS) flush 10-30 mL  10-30 mL InterCATHeter PRN    sodium chloride (NS) flush 10 mL  10 mL InterCATHeter Q24H    sodium chloride (NS) flush 10 mL  10 mL InterCATHeter PRN    sodium chloride (NS) flush 10-40 mL  10-40 mL InterCATHeter Q8H    sodium chloride (NS) flush 20 mL  20 mL InterCATHeter Q24H    meropenem (MERREM) 500 mg in 0.9% sodium chloride (MBP/ADV) 50 mL  0.5 g IntraVENous Q12H    dextrose (D50W) injection syrg 25 g  25 g IntraVENous PRN    0.9% sodium chloride infusion 250 mL  250 mL IntraVENous PRN    fentaNYL citrate (PF) injection 25 mcg  25 mcg IntraVENous Q1H PRN    gelatin adsorbable (GELFOAM) 12-7 mm sponge   Topical PRN    sodium chloride (NS) flush 5-10 mL  5-10 mL IntraVENous Q8H    sodium chloride (NS) flush 5-10 mL  5-10 mL IntraVENous PRN    acetaminophen (TYLENOL) tablet 650 mg  650 mg Oral Q4H PRN    HYDROcodone-acetaminophen (NORCO) 5-325 mg per tablet 1 Tab  1 Tab Oral Q4H PRN    ondansetron (ZOFRAN ODT) tablet 4 mg  4 mg Oral Q4H PRN    heparin (porcine) injection 5,000 Units  5,000 Units SubCUTAneous Q8H    acetaminophen (TYLENOL) suppository 650 mg  650 mg Rectal Q4H PRN    albuterol-ipratropium (DUO-NEB) 2.5 MG-0.5 MG/3 ML  3 mL Nebulization Q6H RT    aspirin chewable tablet 81 mg  81 mg Per G Tube DAILY    collagenase (SANTYL) 250 unit/gram ointment   Topical DAILY          LAB AND IMAGING FINDINGS:     Lab Results   Component Value Date/Time    WBC 8.7 08/29/2017 05:10 AM    HGB 7.5 08/29/2017 05:10 AM    PLATELET 718 48/43/2826 05:10 AM     Lab Results   Component Value Date/Time    Sodium 133 08/30/2017 05:15 AM    Potassium 3.7 08/30/2017 05:15 AM    Chloride 97 08/30/2017 05:15 AM    CO2 24 08/30/2017 05:15 AM    BUN 55 08/30/2017 05:15 AM    Creatinine 5.14 08/30/2017 05:15 AM    Calcium 7.7 08/30/2017 05:15 AM    Magnesium 1.8 08/27/2017 04:50 AM    Phosphorus 4.7 08/27/2017 04:50 AM      Lab Results   Component Value Date/Time    AST (SGOT) 893 08/22/2017 10:00 AM    Alk. phosphatase 333 08/22/2017 10:00 AM    Protein, total 8.5 08/22/2017 10:00 AM    Albumin 1.7 08/22/2017 10:00 AM    Globulin 6.8 08/22/2017 10:00 AM     Lab Results   Component Value Date/Time    INR 1.3 08/23/2017 04:21 AM    Prothrombin time 13.8 08/23/2017 04:21 AM    aPTT 36.3 08/23/2017 04:21 AM      No results found for: IRON, FE, TIBC, IBCT, PSAT, FERR   No results found for: PH, PCO2, PO2  No components found for: [place]   Lab Results   Component Value Date/Time    CK 25 08/22/2017 01:09 PM                Total time: 35 min   Counseling / coordination time, spent as noted above: 25 min   > 50% counseling / coordination?: yes    Prolonged service was provided for  []30 min   []75 min in face to face time in the presence of the patient, spent as noted above. Time Start:   Time End:   Note: this can only be billed with 69498 (initial) or 27820 (follow up). If multiple start / stop times, list each separately.

## 2017-08-30 NOTE — PROGRESS NOTES
Problem: Falls - Risk of  Goal: *Absence of Falls  Document James Fall Risk and appropriate interventions in the flowsheet.    Outcome: Progressing Towards Goal  Fall Risk Interventions:        Mentation Interventions: Adequate sleep, hydration, pain control     Medication Interventions: Bed/chair exit alarm, Evaluate medications/consider consulting pharmacy     Elimination Interventions: Call light in reach

## 2017-08-30 NOTE — PROGRESS NOTES
Problem: Falls - Risk of  Goal: *Absence of Falls  Document James Fall Risk and appropriate interventions in the flowsheet.    Fall Risk Interventions:        Mentation Interventions: Adequate sleep, hydration, pain control, Bed/chair exit alarm, Evaluate medications/consider consulting pharmacy, Family/sitter at bedside, Reorient patient     Medication Interventions: Bed/chair exit alarm, Evaluate medications/consider consulting pharmacy     Elimination Interventions: Bed/chair exit alarm, Call light in reach, Patient to call for help with toileting needs

## 2017-08-30 NOTE — PROGRESS NOTES
Hospitalist Progress Note  Fior Huang MD  Office: [phone]        Date of Service:  2017  NAME:  Judie Gomez  :  11/10/1933  MRN:  [id number]      Admission Summary:     The patient is an 19-year-old patient who originally was a resident of [place], [place]. He   was admitted in [facility] on 2017 due to hypotension, hyperventilation and confusion.  Since the patient was unable to be extubated while in the care in the ICU, then tracheostomy and PEG was done and the patient was transferred to a long term care facility   that was in [place]. his morning the patient continued with shortness of breath, for which we will transfer the patient to our ER. While in our ER, the patient was connected to the ventilator through the tracheostomy.      Interval history / Subjective:   Mr.Frank Sierra Romano appears weak, alert and responsive. Wife in the room. Assessment & Plan:     Sepsis with septic shock  UTI with ESBL E coli, as well as E coli bacteremmia, shock has improved, but borderline low BP. Continuing Merropenem.       Bacteremia with ESBL e coli  On meropenem. May need weeks of antibiotics depending on progress  Follow up blood culture from ,pending    ESBL E coli pneumonia: mx as above.      Acute and chronic renal failure,due to hypotension from sepsis/ATN  Status post 2 HD, but no further HD plan per nephrology and recommending comfort care. Creatinine creeping up      Hyperkalemic, hypernatremic, dehydration   Improved.      Acute hypoxic Respiratory failure  Extubated to trache collar.       Coronary artery disease   Status post CABG.  No acute issue.      Decubital ulcer -PROVIDENCIA STUARTII    Also sensitive to meropenem no debridement planned per surgery.      Anemia   Possibly from chronic disease monitor.  Status post 1 unit PRBC transfusion.      Severe Protein Calorie Malnutrition  -On Tube feeding          Code status: DNR  DVT prophylaxis: On heparin    Care Plan discussed with: Patient/Family  Disposition:depending on clinical progress,may need SNF         Disposition:will initiate discharge process pending blood culture remain negative x48 hours and patient remains stable. Hospital Problems  Never Reviewed          Codes Class Noted POA    UTI (urinary tract infection) due to urinary indwelling catheter [facility]) ICD-10-CM: T83.511A, N39.0  ICD-9-CM: 996.64, 599.0  8/22/2017 Unknown        Sepsis affecting skin ICD-10-CM: L02.91  ICD-9-CM: 682.9  8/22/2017 Unknown                Review of Systems:   Pertinent items are noted in HPI. Vital Signs:    Last 24hrs VS reviewed since prior progress note. Most recent are:  Visit Vitals    /56 (BP 1 Location: Left arm, BP Patient Position: At rest)    Pulse 88    Temp 98.7 °F (37.1 °C)    Resp 21    Ht 6' (1.829 m)    Wt 69 kg (152 lb 1.9 oz)    SpO2 99%    BMI 20.63 kg/m2         Intake/Output Summary (Last 24 hours) at 08/30/17 1722  Last data filed at 08/30/17 1600   Gross per 24 hour   Intake             1078 ml   Output              300 ml   Net              778 ml        Physical Examination:             Constitutional: Awake,flat. Not in distress   ENT:  Trach collar in place. Right IJ in place. Resp:  CTA bilaterally. No wheezing/rhonchi/rales. No accessory muscle use   CV:  Regular rhythm, normal rate, no murmurs, gallops, rubs    GI:  Soft, non distended, non tender. normoactive bowel sounds, no hepatosplenomegaly   PEG in place. Musculoskeletal:  No edema, warm, 2+ pulses throughout    Neurologic: Awake,flat     Eyes:  EOMI. Anicteric sclerae, PERRL.        Data Review:    Review and/or order of clinical lab test      Labs:     Recent Labs      08/29/17   0510  08/28/17   0357   WBC  8.7  9.1   HGB  7.5*  8.0*   HCT  23.4*  24.6*   PLT  187  196     Recent Labs      08/30/17   0515  08/29/17   0510 08/28/17   0357   NA  133*  135*  135*   K  3.7  3.8  3.3*   CL  97  98  98   CO2  24  26  26   BUN  55*  52*  49*   CREA  5.14*  4.57*  4.06*   GLU  123*  199*  119*   CA  7.7*  7.4*  7.4*     No results for input(s): SGOT, GPT, ALT, AP, TBIL, TBILI, TP, ALB, GLOB, GGT, AML, LPSE in the last 72 hours. No lab exists for component: AMYP, HLPSE  No results for input(s): INR, PTP, APTT in the last 72 hours. No lab exists for component: INREXT, INREXT   No results for input(s): FE, TIBC, PSAT, FERR in the last 72 hours. No results found for: FOL, RBCF   No results for input(s): PH, PCO2, PO2 in the last 72 hours. No results for input(s): CPK, CKNDX, TROIQ in the last 72 hours.     No lab exists for component: CPKMB  No results found for: CHOL, CHOLX, CHLST, CHOLV, HDL, LDL, LDLC, DLDLP, TGLX, TRIGL, TRIGP, CHHD, CHHDX  Lab Results   Component Value Date/Time    Glucose (POC) 137 08/30/2017 11:53 AM    Glucose (POC) 162 08/29/2017 05:23 PM    Glucose (POC) 98 08/25/2017 04:37 AM    Glucose (POC) 90 08/24/2017 09:10 PM    Glucose (POC) 83 08/24/2017 02:12 PM     Lab Results   Component Value Date/Time    Color YELLOW/STRAW 08/22/2017 12:14 PM    Appearance TURBID 08/22/2017 12:14 PM    Specific gravity 1.010 08/22/2017 12:14 PM    pH (UA) 7.0 08/22/2017 12:14 PM    Protein 100 08/22/2017 12:14 PM    Glucose NEGATIVE  08/22/2017 12:14 PM    Ketone NEGATIVE  08/22/2017 12:14 PM    Bilirubin NEGATIVE  08/22/2017 12:14 PM    Urobilinogen 0.2 08/22/2017 12:14 PM    Nitrites NEGATIVE  08/22/2017 12:14 PM    Leukocyte Esterase LARGE 08/22/2017 12:14 PM    Epithelial cells FEW 08/22/2017 12:14 PM    Bacteria 4+ 08/22/2017 12:14 PM    WBC  08/22/2017 12:14 PM    RBC 20-50 08/22/2017 12:14 PM         Medications Reviewed:     Current Facility-Administered Medications   Medication Dose Route Frequency    pantoprazole (PROTONIX) 2 mg/mL oral suspension 40 mg  40 mg PEG Tube ACB    alteplase (CATHFLO) 1 mg in sterile water (preservative free) 1 mL injection  1 mg InterCATHeter PRN    bacitracin 500 unit/gram packet 1 Packet  1 Packet Topical PRN    sodium chloride (NS) flush 10-30 mL  10-30 mL InterCATHeter PRN    sodium chloride (NS) flush 10 mL  10 mL InterCATHeter Q24H    sodium chloride (NS) flush 10 mL  10 mL InterCATHeter PRN    sodium chloride (NS) flush 10-40 mL  10-40 mL InterCATHeter Q8H    sodium chloride (NS) flush 20 mL  20 mL InterCATHeter Q24H    meropenem (MERREM) 500 mg in 0.9% sodium chloride (MBP/ADV) 50 mL  0.5 g IntraVENous Q12H    dextrose (D50W) injection syrg 25 g  25 g IntraVENous PRN    0.9% sodium chloride infusion 250 mL  250 mL IntraVENous PRN    fentaNYL citrate (PF) injection 25 mcg  25 mcg IntraVENous Q1H PRN    gelatin adsorbable (GELFOAM) 12-7 mm sponge   Topical PRN    sodium chloride (NS) flush 5-10 mL  5-10 mL IntraVENous Q8H    sodium chloride (NS) flush 5-10 mL  5-10 mL IntraVENous PRN    acetaminophen (TYLENOL) tablet 650 mg  650 mg Oral Q4H PRN    HYDROcodone-acetaminophen (NORCO) 5-325 mg per tablet 1 Tab  1 Tab Oral Q4H PRN    ondansetron (ZOFRAN ODT) tablet 4 mg  4 mg Oral Q4H PRN    heparin (porcine) injection 5,000 Units  5,000 Units SubCUTAneous Q8H    acetaminophen (TYLENOL) suppository 650 mg  650 mg Rectal Q4H PRN    albuterol-ipratropium (DUO-NEB) 2.5 MG-0.5 MG/3 ML  3 mL Nebulization Q6H RT    aspirin chewable tablet 81 mg  81 mg Per G Tube DAILY    collagenase (SANTYL) 250 unit/gram ointment   Topical DAILY     ______________________________________________________________________  EXPECTED LENGTH OF STAY: 4d 21h  ACTUAL LENGTH OF STAY:          8                 Kelley Cruz MD

## 2017-08-30 NOTE — PROGRESS NOTES
RENAL  PROGRESS NOTE        Subjective:    COMPLAINT: extubated, non verbal.     Objective:   VITALS SIGNS:    Visit Vitals    BP 99/52 (BP 1 Location: Left arm, BP Patient Position: At rest)    Pulse 94    Temp 99 °F (37.2 °C)    Resp 21    Ht 6' (1.829 m)    Wt 69 kg (152 lb 1.9 oz)    SpO2 96%    BMI 20.63 kg/m2       O2 Device: Tracheal collar   O2 Flow Rate (L/min): 7 l/min   Temp (24hrs), Av.3 °F (36.8 °C), Min:97.8 °F (36.6 °C), Max:99 °F (37.2 °C)         PHYSICAL EXAM:  No edema  Open eyes  abd soft    DATA REVIEW:     INTAKE / OUTPUT:   Last shift:         Last 3 shifts: 1901 - 700  In: 0230 [I.V.:150]  Out: 725 [Urine:725]    Intake/Output Summary (Last 24 hours) at 17 1043  Last data filed at 17 0700   Gross per 24 hour   Intake              932 ml   Output              460 ml   Net              472 ml         LABS:   Recent Labs      17   0510  17   0357   WBC  8.7  9.1   HGB  7.5*  8.0*   HCT  23.4*  24.6*   PLT  187  196     Recent Labs      17   0515  17   0510  17   0357   NA  133*  135*  135*   K  3.7  3.8  3.3*   CL  97  98  98   CO2  24  26  26   GLU  123*  199*  119*   BUN  55*  52*  49*   CREA  5.14*  4.57*  4.06*   CA  7.7*  7.4*  7.4*           Assessment:   FREDERICK, likely septic ATN/hypotension with chronic gutierrez, s.p urgent dialysis on 17 and again on 17  kimberly on 17  CKD ? Baseline  prostate CA as per wife  Severe hyperkalemia, resolved s.p urgent dialysis  Septic shock;gram neg rods bacteremia likely from urospesis  VDRF  Cachexia  Anemia as per first team  Plan:      As per discussion with wife- no HD and no escalation of care.          Michael Miranda MD

## 2017-08-30 NOTE — PROGRESS NOTES
CM reviewed case with treatment team during Bedside Interdisciplinary Rounds. Patient continues severely medically unstable with Palliative in active discussion with wife regarding plan of care. CM noted that referrals to skilled nursing facilities was previously done with Yes response by [facility] on 8/27/17. CM noted that previous CM sent referral to [address] [place] [address] on 8/29/17. CM to continue to follow for discharge planning needs.

## 2017-08-30 NOTE — PALLIATIVE CARE
Palliative Medicine Social Work    Visited with patient and wife, Mei Gillette, this morning. Patient was alert and able to tell me he was without pain or any worries. Wife was very pleased that he was \"doing good\"; feels that his alert state and transfer to floor is a sign of his recovery. Explored this thought process a bit. She feels that because he has no pain and is awake, he is improving. She said the doctors seem to feel this is true as well. Talked about how stability or even plan for discharge is different than improved health. Discussed his continued rise in creatinine as sign his kidneys are not improving. She is having trouble understanding this; feels that because he is still putting out urine in gutierrez that kidneys are working fine. Talked about the blessing of renal failure usually causing no pain. Discussed that there is still great concern for his ability to recover; that even if he is stable enough for discharge, his life expectancy is still limited. She states she asked the nephrologist for \"one more dialysis\" hoping this would be the treatment would make the difference. Talked about risks for hypotension, needing to go back on vent if we continued with HD; that risks outweighed benefits. Reviewed goals for now which are to continue with current care, but not escalate. She is still not ready to focus on complete shift to comfort and hopes for a \"miracle\". Communicated information to nurse. Thank you for the opportunity to be involved in the care of Emily Fuchs and his wife. Alejandra Skinner, CATRINA, [facility]-  Palliative Medicine   [employer] ® ACP Facilitator   [phone]

## 2017-08-31 LAB
ANION GAP SERPL CALC-SCNC: 10 MMOL/L (ref 5–15)
BUN SERPL-MCNC: 57 MG/DL (ref 6–20)
BUN/CREAT SERPL: 10 (ref 12–20)
CALCIUM SERPL-MCNC: 7.8 MG/DL (ref 8.5–10.1)
CHLORIDE SERPL-SCNC: 97 MMOL/L (ref 97–108)
CO2 SERPL-SCNC: 26 MMOL/L (ref 21–32)
CREAT SERPL-MCNC: 5.47 MG/DL (ref 0.7–1.3)
GLUCOSE BLD STRIP.AUTO-MCNC: 141 MG/DL (ref 65–100)
GLUCOSE SERPL-MCNC: 126 MG/DL (ref 65–100)
POTASSIUM SERPL-SCNC: 3.9 MMOL/L (ref 3.5–5.1)
SERVICE CMNT-IMP: ABNORMAL
SODIUM SERPL-SCNC: 133 MMOL/L (ref 136–145)

## 2017-08-31 PROCEDURE — 36415 COLL VENOUS BLD VENIPUNCTURE: CPT | Performed by: HOSPITALIST

## 2017-08-31 PROCEDURE — 94640 AIRWAY INHALATION TREATMENT: CPT

## 2017-08-31 PROCEDURE — 82962 GLUCOSE BLOOD TEST: CPT

## 2017-08-31 PROCEDURE — 65660000000 HC RM CCU STEPDOWN

## 2017-08-31 PROCEDURE — 77010033678 HC OXYGEN DAILY

## 2017-08-31 PROCEDURE — 74011000250 HC RX REV CODE- 250: Performed by: INTERNAL MEDICINE

## 2017-08-31 PROCEDURE — 80048 BASIC METABOLIC PNL TOTAL CA: CPT | Performed by: HOSPITALIST

## 2017-08-31 PROCEDURE — 74011250637 HC RX REV CODE- 250/637: Performed by: INTERNAL MEDICINE

## 2017-08-31 PROCEDURE — 74011250636 HC RX REV CODE- 250/636: Performed by: SURGERY

## 2017-08-31 PROCEDURE — 77030011256 HC DRSG MEPILEX <16IN NO BORD MOLN -A

## 2017-08-31 PROCEDURE — 74011250636 HC RX REV CODE- 250/636: Performed by: INTERNAL MEDICINE

## 2017-08-31 PROCEDURE — 77030011255 HC DSG AQUACEL AG BMS -A

## 2017-08-31 PROCEDURE — 77030018846 HC SOL IRR STRL H20 ICUM -A

## 2017-08-31 PROCEDURE — 74011000258 HC RX REV CODE- 258: Performed by: SURGERY

## 2017-08-31 PROCEDURE — 77030018836 HC SOL IRR NACL ICUM -A

## 2017-08-31 RX ADMIN — Medication 20 ML: at 12:00

## 2017-08-31 RX ADMIN — Medication 10 ML: at 12:00

## 2017-08-31 RX ADMIN — Medication 10 ML: at 06:18

## 2017-08-31 RX ADMIN — MEROPENEM 500 MG: 500 INJECTION, POWDER, FOR SOLUTION INTRAVENOUS at 09:32

## 2017-08-31 RX ADMIN — HEPARIN SODIUM 5000 UNITS: 5000 INJECTION, SOLUTION INTRAVENOUS; SUBCUTANEOUS at 22:21

## 2017-08-31 RX ADMIN — HEPARIN SODIUM 5000 UNITS: 5000 INJECTION, SOLUTION INTRAVENOUS; SUBCUTANEOUS at 06:19

## 2017-08-31 RX ADMIN — HEPARIN SODIUM 5000 UNITS: 5000 INJECTION, SOLUTION INTRAVENOUS; SUBCUTANEOUS at 14:42

## 2017-08-31 RX ADMIN — IPRATROPIUM BROMIDE AND ALBUTEROL SULFATE 3 ML: .5; 3 SOLUTION RESPIRATORY (INHALATION) at 01:36

## 2017-08-31 RX ADMIN — PANTOPRAZOLE SODIUM 40 MG: 40 TABLET, DELAYED RELEASE ORAL at 06:30

## 2017-08-31 RX ADMIN — Medication 10 ML: at 22:00

## 2017-08-31 RX ADMIN — MEROPENEM 500 MG: 500 INJECTION, POWDER, FOR SOLUTION INTRAVENOUS at 22:22

## 2017-08-31 RX ADMIN — IPRATROPIUM BROMIDE AND ALBUTEROL SULFATE 3 ML: .5; 3 SOLUTION RESPIRATORY (INHALATION) at 14:48

## 2017-08-31 RX ADMIN — IPRATROPIUM BROMIDE AND ALBUTEROL SULFATE 3 ML: .5; 3 SOLUTION RESPIRATORY (INHALATION) at 08:14

## 2017-08-31 RX ADMIN — Medication 10 ML: at 14:00

## 2017-08-31 RX ADMIN — IPRATROPIUM BROMIDE AND ALBUTEROL SULFATE 3 ML: .5; 3 SOLUTION RESPIRATORY (INHALATION) at 19:45

## 2017-08-31 RX ADMIN — ASPIRIN 81 MG 81 MG: 81 TABLET ORAL at 09:32

## 2017-08-31 RX ADMIN — Medication 20 ML: at 13:05

## 2017-08-31 RX ADMIN — COLLAGENASE SANTYL: 250 OINTMENT TOPICAL at 09:00

## 2017-08-31 NOTE — ROUTINE PROCESS
Bedside and Verbal shift change report given to Criss (oncoming nurse) by Stephen Chavez (offgoing nurse). Report included the following information SBAR, Kardex, ED Summary, Procedure Summary, Intake/Output, MAR, Accordion, Recent Results, Med Rec Status, Cardiac Rhythm NSR and Alarm Parameters .

## 2017-08-31 NOTE — PROGRESS NOTES
Problem: Falls - Risk of  Goal: *Absence of Falls  Document James Fall Risk and appropriate interventions in the flowsheet.    Outcome: Progressing Towards Goal  Fall Risk Interventions:        Mentation Interventions: Adequate sleep, hydration, pain control, Bed/chair exit alarm     Medication Interventions: Bed/chair exit alarm, Evaluate medications/consider consulting pharmacy     Elimination Interventions: Bed/chair exit alarm, Call light in reach, Patient to call for help with toileting needs

## 2017-08-31 NOTE — PROGRESS NOTES
Hospitalist Progress Note  [facility], [place]  Office: [phone]        Date of Service:  2017  NAME:  Eren Gibbs  :  11/10/1933  MRN:  [id number]      Admission Summary:     The patient is an 71-year-old patient who originally was a resident of [place], [place]. He   was admitted in [facility] on 2017 due to hypotension, hyperventilation and confusion.  Since the patient was unable to be extubated while in the care in the ICU, then tracheostomy and PEG was done and the patient was transferred to a long term care facility   that was in [place]. his morning the patient continued with shortness of breath, for which we will transfer the patient to our ER. While in our ER, the patient was connected to the ventilator through the tracheostomy.      Interval history / Subjective:   Mr.Frank Hall Half appears weak, alert and responsive. Wife in the room. Assessment & Plan:     Sepsis with septic shock  UTI with ESBL E coli, as well as E coli bacteremmia, shock has improved, but borderline low BP. Continuing Merropenem.       Bacteremia with ESBL e coli  On meropenem. May need weeks of antibiotics depending on progress  [facility]  ID consulted for IV abx management. ESBL E coli pneumonia: mx as above.      Acute and chronic renal failure,due to hypotension from sepsis/ATN  Status post 2 HD, but no further HD plan per nephrology and recommending comfort care. Creatinine creeping up slowly       Hyperkalemic, hypernatremic, dehydration   Improved.      Acute hypoxic Respiratory failure  Extubated to trache collar.       Coronary artery disease   Status post CABG.  No acute issue.      Decubital ulcer -PROVIDENCIA STUARTII    Also sensitive to meropenem no debridement planned per surgery.      Anemia   Possibly from chronic disease monitor.  Status post 1 unit PRBC transfusion.      Severe Protein Calorie Malnutrition  -On Tube feeding          Code status: DNR  DVT prophylaxis: On heparin    Care Plan discussed with: Patient/Family  Disposition:depending on clinical progress,may need SNF. renal has deemed him not a candidate for continued dialysis,creatinien is slowly rising. Hospice on discharge seems appropriate. Disposition:will initiate discharge process pending blood culture remain negative x48 hours and patient remains stable. Hospital Problems  Never Reviewed          Codes Class Noted POA    UTI (urinary tract infection) due to urinary indwelling catheter [facility]) ICD-10-CM: T83.511A, N39.0  ICD-9-CM: 996.64, 599.0  8/22/2017 Unknown        Sepsis affecting skin ICD-10-CM: L02.91  ICD-9-CM: 682.9  8/22/2017 Unknown                Review of Systems:   Pertinent items are noted in HPI. Vital Signs:    Last 24hrs VS reviewed since prior progress note. Most recent are:  Visit Vitals    /57    Pulse 82    Temp 98.3 °F (36.8 °C)    Resp 24    Ht 6' (1.829 m)    Wt 69.2 kg (152 lb 8.9 oz)    SpO2 100%    BMI 20.69 kg/m2         Intake/Output Summary (Last 24 hours) at 08/31/17 1122  Last data filed at 08/31/17 0820   Gross per 24 hour   Intake              876 ml   Output              150 ml   Net              726 ml        Physical Examination:             Constitutional: Awake,flat. Not in distress   ENT:  Trach collar in place. Right IJ in place. Resp:  CTA bilaterally. No wheezing/rhonchi/rales. No accessory muscle use   CV:  Regular rhythm, normal rate, no murmurs, gallops, rubs    GI:  Soft, non distended, non tender. normoactive bowel sounds, no hepatosplenomegaly   PEG in place. Musculoskeletal:  No edema, warm, 2+ pulses throughout    Neurologic: Awake,flat     Eyes:  EOMI. Anicteric sclerae, PERRL.        Data Review:    Review and/or order of clinical lab test      Labs:     Recent Labs      08/29/17   0510   WBC  8.7   HGB  7.5*   HCT 23.4*   PLT  187     Recent Labs      08/31/17   0626  08/30/17   0515  08/29/17   0510   NA  133*  133*  135*   K  3.9  3.7  3.8   CL  97  97  98   CO2  26  24  26   BUN  57*  55*  52*   CREA  5.47*  5.14*  4.57*   GLU  126*  123*  199*   CA  7.8*  7.7*  7.4*     No results for input(s): SGOT, GPT, ALT, AP, TBIL, TBILI, TP, ALB, GLOB, GGT, AML, LPSE in the last 72 hours. No lab exists for component: AMYP, HLPSE  No results for input(s): INR, PTP, APTT in the last 72 hours. No lab exists for component: INREXT, INREXT   No results for input(s): FE, TIBC, PSAT, FERR in the last 72 hours. No results found for: FOL, RBCF   No results for input(s): PH, PCO2, PO2 in the last 72 hours. No results for input(s): CPK, CKNDX, TROIQ in the last 72 hours.     No lab exists for component: CPKMB  No results found for: CHOL, CHOLX, CHLST, CHOLV, HDL, LDL, LDLC, DLDLP, TGLX, TRIGL, TRIGP, CHHD, CHHDX  Lab Results   Component Value Date/Time    Glucose (POC) 137 08/30/2017 11:53 AM    Glucose (POC) 162 08/29/2017 05:23 PM    Glucose (POC) 98 08/25/2017 04:37 AM    Glucose (POC) 90 08/24/2017 09:10 PM    Glucose (POC) 83 08/24/2017 02:12 PM     Lab Results   Component Value Date/Time    Color YELLOW/STRAW 08/22/2017 12:14 PM    Appearance TURBID 08/22/2017 12:14 PM    Specific gravity 1.010 08/22/2017 12:14 PM    pH (UA) 7.0 08/22/2017 12:14 PM    Protein 100 08/22/2017 12:14 PM    Glucose NEGATIVE  08/22/2017 12:14 PM    Ketone NEGATIVE  08/22/2017 12:14 PM    Bilirubin NEGATIVE  08/22/2017 12:14 PM    Urobilinogen 0.2 08/22/2017 12:14 PM    Nitrites NEGATIVE  08/22/2017 12:14 PM    Leukocyte Esterase LARGE 08/22/2017 12:14 PM    Epithelial cells FEW 08/22/2017 12:14 PM    Bacteria 4+ 08/22/2017 12:14 PM    WBC  08/22/2017 12:14 PM    RBC 20-50 08/22/2017 12:14 PM         Medications Reviewed:     Current Facility-Administered Medications   Medication Dose Route Frequency    pantoprazole (PROTONIX) 2 mg/mL oral suspension 40 mg  40 mg PEG Tube ACB    alteplase (CATHFLO) 1 mg in sterile water (preservative free) 1 mL injection  1 mg InterCATHeter PRN    bacitracin 500 unit/gram packet 1 Packet  1 Packet Topical PRN    sodium chloride (NS) flush 10-30 mL  10-30 mL InterCATHeter PRN    sodium chloride (NS) flush 10 mL  10 mL InterCATHeter Q24H    sodium chloride (NS) flush 10 mL  10 mL InterCATHeter PRN    sodium chloride (NS) flush 10-40 mL  10-40 mL InterCATHeter Q8H    sodium chloride (NS) flush 20 mL  20 mL InterCATHeter Q24H    meropenem (MERREM) 500 mg in 0.9% sodium chloride (MBP/ADV) 50 mL  0.5 g IntraVENous Q12H    dextrose (D50W) injection syrg 25 g  25 g IntraVENous PRN    0.9% sodium chloride infusion 250 mL  250 mL IntraVENous PRN    fentaNYL citrate (PF) injection 25 mcg  25 mcg IntraVENous Q1H PRN    gelatin adsorbable (GELFOAM) 12-7 mm sponge   Topical PRN    sodium chloride (NS) flush 5-10 mL  5-10 mL IntraVENous Q8H    sodium chloride (NS) flush 5-10 mL  5-10 mL IntraVENous PRN    acetaminophen (TYLENOL) tablet 650 mg  650 mg Oral Q4H PRN    HYDROcodone-acetaminophen (NORCO) 5-325 mg per tablet 1 Tab  1 Tab Oral Q4H PRN    ondansetron (ZOFRAN ODT) tablet 4 mg  4 mg Oral Q4H PRN    heparin (porcine) injection 5,000 Units  5,000 Units SubCUTAneous Q8H    acetaminophen (TYLENOL) suppository 650 mg  650 mg Rectal Q4H PRN    albuterol-ipratropium (DUO-NEB) 2.5 MG-0.5 MG/3 ML  3 mL Nebulization Q6H RT    aspirin chewable tablet 81 mg  81 mg Per G Tube DAILY    collagenase (SANTYL) 250 unit/gram ointment   Topical DAILY     ______________________________________________________________________  EXPECTED LENGTH OF STAY: 4d 21h  ACTUAL LENGTH OF STAY:          9                 Particortiz Bennett MD

## 2017-08-31 NOTE — WOUND CARE
WOCN Note:     New consult placed by RN for wound care. Chart shows:  Admitted for UTI and sepsis affecting skin; history of pressure injuries, HTN, DM, CAD, previous hospitalization in [place] and was intubated, then tracheostomy created, peg tube placed; transferred to long-term care and had respiratory failure, deconditioning and C-diff; fevers and SOB and brought to ER  WBC = 8.7  On Merrem  Admitted from [place] [address]. Assessment:   Patient is awake but does not make eye contact. Wife in room. He required full assistance with turning and Paulette and PCT in room giving bath. Bed: total care sport; 6' tall 152 lbs  Patient has a Rizo. Trach with trach collar. Diet: tube feeding  Patient reports no pain and does not guard or grimace with wound care and turning. Bilateral heel skin intact and light blanching pink. Heel offloading boots in use. Multiple bruises noted to legs and arms. 1. POA, right upper back unstageable pressure injury (wife reports it has been present for about 4 months) = 1.5 x 1.3 x 0.1 cm  100% moist adherent yellow. no exudate. Periwound intact & without erythema. Cleaned with saline, Santyl and Mepilex border applied. 2. POA, sacral stage 4 pressure injury = 7.5 x 8 x 1 cm with undermining from 8-12 o'clock = 2.5 cm Base is 70% moist deep/bright pink 30% scattered yellow with some connective tissue noted. No erupting bone palpated but not far from the surface. No odor. Periwound intact and shiny, blanching pink. Scant serusanguious exudate. Devitalized margin proximally. Cleaned with saline, Santyl applied to base and then moist packing placed. Covered with large adhesive island dressing to protect form stool . 3. Left dorsal wrist skin tear = 2.5 x 2 x 0.1 cm 100% moist pink with scant bleeding. Cleaned with saline, petroleum ointment applied and covered with Mepilex border.     4. Chest toward left side, ulceration with unknown etiology (sternal wires are readily palpable under skin) = 1.3 x 1.3 x 0.2 cm 100% moist red with scant bleeding. Periwound intact and without erythema. Cleaned with saline, Aquacel Ag applied with a few drops of saline to activate silver, covered with Mepilex border. Recommendations:    Back and sacrum: apply Santyl daily. Cover back with Mepilex border and sacrum with moist packing and dry cover. Chest: apply Aquacel Ag every three days with a few drops of saline to moisten, dry cover. Left wrist: every three days apply petroleum ointment and Mepilex border    Skin Care & Pressure Prevention:  Turn/reposition approximately every 2 hours and offload heels. Manage incontinence  Total Care Sport: Use only flat sheet and one incontinence pad. Discussed above plan with patient's wife & RN, Malachi Turcios.     Transition of Care: Plan to follow weekly and as needed while admitted to hospital.    FUENTES MonroyN, RN, [facility]  Certified Wound, Ostomy, Continence Nurse  office [phone]  pager [phone] or call  to page

## 2017-08-31 NOTE — PROGRESS NOTES
Rafael noted in [employer] that [facility] has accepted this pt for admission. CM called pt's wife, Marjan Early, and left her a message asking her to call this CM when she is available so plans for d/c can be finalized.  Simon Webb

## 2017-08-31 NOTE — PROGRESS NOTES
Problem: Falls - Risk of  Goal: *Absence of Falls  Document James Fall Risk and appropriate interventions in the flowsheet.    Outcome: Progressing Towards Goal  Fall Risk Interventions:        Mentation Interventions: Adequate sleep, hydration, pain control     Medication Interventions: Evaluate medications/consider consulting pharmacy     Elimination Interventions: Call light in reach, Toileting schedule/hourly rounds

## 2017-08-31 NOTE — PROGRESS NOTES
Bedside shift change report given to Akosua Dillard (oncoming nurse) by Ana Gill RN (offgoing nurse). Report included the following information SBAR, Kardex, Intake/Output, MAR and Cardiac Rhythm NSR.

## 2017-09-01 LAB
ANION GAP SERPL CALC-SCNC: 9 MMOL/L (ref 5–15)
BASOPHILS # BLD: 0 K/UL (ref 0–0.1)
BASOPHILS NFR BLD: 0 % (ref 0–1)
BUN SERPL-MCNC: 60 MG/DL (ref 6–20)
BUN/CREAT SERPL: 10 (ref 12–20)
CALCIUM SERPL-MCNC: 7.9 MG/DL (ref 8.5–10.1)
CHLORIDE SERPL-SCNC: 95 MMOL/L (ref 97–108)
CO2 SERPL-SCNC: 28 MMOL/L (ref 21–32)
CREAT SERPL-MCNC: 5.9 MG/DL (ref 0.7–1.3)
EOSINOPHIL # BLD: 0.7 K/UL (ref 0–0.4)
EOSINOPHIL NFR BLD: 7 % (ref 0–7)
ERYTHROCYTE [DISTWIDTH] IN BLOOD BY AUTOMATED COUNT: 16 % (ref 11.5–14.5)
GLUCOSE BLD STRIP.AUTO-MCNC: 142 MG/DL (ref 65–100)
GLUCOSE BLD STRIP.AUTO-MCNC: 143 MG/DL (ref 65–100)
GLUCOSE BLD STRIP.AUTO-MCNC: 149 MG/DL (ref 65–100)
GLUCOSE BLD STRIP.AUTO-MCNC: 169 MG/DL (ref 65–100)
GLUCOSE SERPL-MCNC: 151 MG/DL (ref 65–100)
HCT VFR BLD AUTO: 25.6 % (ref 36.6–50.3)
HGB BLD-MCNC: 8.3 G/DL (ref 12.1–17)
LYMPHOCYTES # BLD: 1.1 K/UL (ref 0.8–3.5)
LYMPHOCYTES NFR BLD: 10 % (ref 12–49)
MCH RBC QN AUTO: 27.3 PG (ref 26–34)
MCHC RBC AUTO-ENTMCNC: 32.4 G/DL (ref 30–36.5)
MCV RBC AUTO: 84.2 FL (ref 80–99)
MONOCYTES # BLD: 0.7 K/UL (ref 0–1)
MONOCYTES NFR BLD: 6 % (ref 5–13)
NEUTS SEG # BLD: 8.3 K/UL (ref 1.8–8)
NEUTS SEG NFR BLD: 77 % (ref 32–75)
PLATELET # BLD AUTO: 229 K/UL (ref 150–400)
POTASSIUM SERPL-SCNC: 3.8 MMOL/L (ref 3.5–5.1)
RBC # BLD AUTO: 3.04 M/UL (ref 4.1–5.7)
SERVICE CMNT-IMP: ABNORMAL
SODIUM SERPL-SCNC: 132 MMOL/L (ref 136–145)
WBC # BLD AUTO: 10.8 K/UL (ref 4.1–11.1)

## 2017-09-01 PROCEDURE — C1751 CATH, INF, PER/CENT/MIDLINE: HCPCS

## 2017-09-01 PROCEDURE — 36415 COLL VENOUS BLD VENIPUNCTURE: CPT | Performed by: INTERNAL MEDICINE

## 2017-09-01 PROCEDURE — 77030021668 HC NEB PREFIL KT VYRM -A

## 2017-09-01 PROCEDURE — 80048 BASIC METABOLIC PNL TOTAL CA: CPT | Performed by: INTERNAL MEDICINE

## 2017-09-01 PROCEDURE — 74011250636 HC RX REV CODE- 250/636: Performed by: INTERNAL MEDICINE

## 2017-09-01 PROCEDURE — 06HY33Z INSERTION OF INFUSION DEVICE INTO LOWER VEIN, PERCUTANEOUS APPROACH: ICD-10-PCS | Performed by: HOSPITALIST

## 2017-09-01 PROCEDURE — 65660000000 HC RM CCU STEPDOWN

## 2017-09-01 PROCEDURE — 77030020365 HC SOL INJ SOD CL 0.9% 50ML

## 2017-09-01 PROCEDURE — 74011250636 HC RX REV CODE- 250/636: Performed by: SURGERY

## 2017-09-01 PROCEDURE — 36589 REMOVAL TUNNELED CV CATH: CPT

## 2017-09-01 PROCEDURE — 02PAX3Z REMOVAL OF INFUSION DEVICE FROM HEART, EXTERNAL APPROACH: ICD-10-PCS | Performed by: HOSPITALIST

## 2017-09-01 PROCEDURE — 36569 INSJ PICC 5 YR+ W/O IMAGING: CPT | Performed by: HOSPITALIST

## 2017-09-01 PROCEDURE — 77030020847 HC STATLOK BARD -A

## 2017-09-01 PROCEDURE — 77010033678 HC OXYGEN DAILY

## 2017-09-01 PROCEDURE — 05HB33Z INSERTION OF INFUSION DEVICE INTO RIGHT BASILIC VEIN, PERCUTANEOUS APPROACH: ICD-10-PCS | Performed by: HOSPITALIST

## 2017-09-01 PROCEDURE — 85025 COMPLETE CBC W/AUTO DIFF WBC: CPT | Performed by: HOSPITALIST

## 2017-09-01 PROCEDURE — 74011250637 HC RX REV CODE- 250/637: Performed by: SURGERY

## 2017-09-01 PROCEDURE — 74011000258 HC RX REV CODE- 258: Performed by: SURGERY

## 2017-09-01 PROCEDURE — 82962 GLUCOSE BLOOD TEST: CPT

## 2017-09-01 PROCEDURE — 74011250637 HC RX REV CODE- 250/637: Performed by: INTERNAL MEDICINE

## 2017-09-01 PROCEDURE — 76937 US GUIDE VASCULAR ACCESS: CPT

## 2017-09-01 PROCEDURE — 77030018719 HC DRSG PTCH ANTIMIC J&J -A

## 2017-09-01 PROCEDURE — 94640 AIRWAY INHALATION TREATMENT: CPT

## 2017-09-01 PROCEDURE — 74011000250 HC RX REV CODE- 250: Performed by: INTERNAL MEDICINE

## 2017-09-01 RX ADMIN — Medication 20 ML: at 17:54

## 2017-09-01 RX ADMIN — PANTOPRAZOLE SODIUM 40 MG: 40 TABLET, DELAYED RELEASE ORAL at 10:09

## 2017-09-01 RX ADMIN — Medication 10 ML: at 21:43

## 2017-09-01 RX ADMIN — IPRATROPIUM BROMIDE AND ALBUTEROL SULFATE 3 ML: .5; 3 SOLUTION RESPIRATORY (INHALATION) at 23:09

## 2017-09-01 RX ADMIN — IPRATROPIUM BROMIDE AND ALBUTEROL SULFATE 3 ML: .5; 3 SOLUTION RESPIRATORY (INHALATION) at 14:54

## 2017-09-01 RX ADMIN — HEPARIN SODIUM 5000 UNITS: 5000 INJECTION, SOLUTION INTRAVENOUS; SUBCUTANEOUS at 22:20

## 2017-09-01 RX ADMIN — Medication 10 ML: at 06:20

## 2017-09-01 RX ADMIN — Medication 10 ML: at 21:53

## 2017-09-01 RX ADMIN — HEPARIN SODIUM 5000 UNITS: 5000 INJECTION, SOLUTION INTRAVENOUS; SUBCUTANEOUS at 06:20

## 2017-09-01 RX ADMIN — IPRATROPIUM BROMIDE AND ALBUTEROL SULFATE 3 ML: .5; 3 SOLUTION RESPIRATORY (INHALATION) at 01:27

## 2017-09-01 RX ADMIN — ASPIRIN 81 MG 81 MG: 81 TABLET ORAL at 10:09

## 2017-09-01 RX ADMIN — Medication 10 ML: at 17:55

## 2017-09-01 RX ADMIN — COLLAGENASE SANTYL: 250 OINTMENT TOPICAL at 10:11

## 2017-09-01 RX ADMIN — MEROPENEM 500 MG: 500 INJECTION, POWDER, FOR SOLUTION INTRAVENOUS at 10:07

## 2017-09-01 RX ADMIN — Medication 10 ML: at 17:54

## 2017-09-01 RX ADMIN — MEROPENEM 500 MG: 500 INJECTION, POWDER, FOR SOLUTION INTRAVENOUS at 22:21

## 2017-09-01 RX ADMIN — IPRATROPIUM BROMIDE AND ALBUTEROL SULFATE 3 ML: .5; 3 SOLUTION RESPIRATORY (INHALATION) at 08:05

## 2017-09-01 NOTE — PROGRESS NOTES
ID Progress Note  2017    Subjective:     Not interactive    Objective:     Antibiotics:  1. Merrem      Vitals:   Visit Vitals    /68 (BP 1 Location: Right arm, BP Patient Position: At rest)    Pulse (!) 104    Temp 98.2 °F (36.8 °C)    Resp 20    Ht 6' (1.829 m)    Wt 70.6 kg (155 lb 10.3 oz)    SpO2 97%    BMI 21.11 kg/m2        Tmax:  Temp (24hrs), Av.2 °F (36.8 °C), Min:98 °F (36.7 °C), Max:98.4 °F (36.9 °C)      Exam:  No distress    Labs:      Recent Labs      17   1116  17   0300  17   0626  17   0515   WBC  10.8   --    --    --    HGB  8.3*   --    --    --    PLT  229   --    --    --    BUN   --   60*  57*  55*   CREA   --   5.90*  5.47*  5.14*       Cultures:     No results found for: [facility]  Lab Results   Component Value Date/Time    Culture result: NO GROWTH 2 DAYS 2017 08:45 AM    Culture result:  2017 12:38 PM     HEAVY ESCHERICHIA COLI ** (EXTENDED SPECTRUM BETA LACTAMASE ) **    Culture result: HEAVY DIPHTHEROIDS 2017 12:38 PM       Radiology:     Line/Insert Date:           Assessment:     1. UTI with bacteremia  2. FREDERICK  3. Grim prognosis    Objective:     1. Treat acute infection with ertapenem  2.  Orders on chart    Mohan Galvan MD

## 2017-09-01 NOTE — PROGRESS NOTES
Removed right central line and right kimberly using sterile technique with catheter intact. 5 minutes of handheld pressure applied and Simone Dempsey RN to hold pressure for 10 additional minutes and assess for bleeding. Instructed her to check patient every 15 minutes for 1 hour and to keep head of bed at 30 degrees and patient in bed. Sterile 2x2 with bacitracin applied to site along with dry sterile dressing.

## 2017-09-01 NOTE — PROGRESS NOTES
Hospitalist Progress Note  Salma Ng MD  Office: [phone]        Date of Service:  2017  NAME:  Gustave Kanner  :  11/10/1933  MRN:  [id number]      Admission Summary:     The patient is an 49-year-old patient who originally was a resident of [place], [place]. He   was admitted in [facility] on 2017 due to hypotension, hyperventilation and confusion.  Since the patient was unable to be extubated while in the care in the ICU, then tracheostomy and PEG was done and the patient was transferred to a long term care facility   that was in [place]. his morning the patient continued with shortness of breath, for which we will transfer the patient to our ER. While in our ER, the patient was connected to the ventilator through the tracheostomy.      Interval history / Subjective: Follow up for bacteremia,     Assessment & Plan:     Sepsis with septic shock  UTI with ESBL E coli, as well as E coli bacteremmia, shock has improved, but borderline low BP. Continuing Merropenem.         Bacteremia with ESBL e coli  On meropenem. May need weeks of antibiotics depending on progress  Follow up blood culture from ,[facility]  ID consulted for IV abx management. ID recommended Ertapenem. PICC placed     ESBL E coli pneumonia: mx as above.      Acute and chronic renal failure,due to hypotension from sepsis/ATN  Status post 2 HD, but no further HD plan per nephrology and recommending comfort care. Creatinine creeping up slowly       Hyperkalemic, hypernatremic, dehydration   Improved.      Acute hypoxic Respiratory failure  Extubated to trache collar.       Coronary artery disease   Status post CABG.  No acute issue.      Decubital ulcer -PROVIDENCIA STUARTII    Also sensitive to meropenem no debridement planned per surgery.      Anemia   Possibly from chronic disease monitor.  Status post 1 unit PRBC transfusion.      Severe Protein Calorie Malnutrition  -On Tube feeding      To remove central line and Rusty today after PICC insertion. Code status: DNR  DVT prophylaxis: On heparin    Care Plan discussed with:   I have been updating his wife everyday,she is not present today. Disposition:will be discharged to SNF in [place] with IV antibiotics hopefully tomorrow. Plan is for hospice after completion of antibiotics. Disposition:will initiate discharge process pending blood culture remain negative x48 hours and patient remains stable. Hospital Problems  Never Reviewed          Codes Class Noted POA    UTI (urinary tract infection) due to urinary indwelling catheter [facility]) ICD-10-CM: T83.511A, N39.0  ICD-9-CM: 996.64, 599.0  8/22/2017 Unknown        Sepsis affecting skin ICD-10-CM: L02.91  ICD-9-CM: 682.9  8/22/2017 Unknown                Review of Systems:   Pertinent items are noted in HPI. Vital Signs:    Last 24hrs VS reviewed since prior progress note. Most recent are:  Visit Vitals    /68 (BP 1 Location: Right arm, BP Patient Position: At rest)    Pulse (!) 104    Temp 98.2 °F (36.8 °C)    Resp 20    Ht 6' (1.829 m)    Wt 70.6 kg (155 lb 10.3 oz)    SpO2 97%    BMI 21.11 kg/m2         Intake/Output Summary (Last 24 hours) at 09/01/17 1825  Last data filed at 09/01/17 1600   Gross per 24 hour   Intake             1286 ml   Output                0 ml   Net             1286 ml        Physical Examination:             Constitutional: Awake,flat. Not in distress   ENT:  Trach collar in place. Right IJ in place. Resp:  CTA bilaterally. No wheezing/rhonchi/rales. No accessory muscle use   CV:  Regular rhythm, normal rate, no murmurs, gallops, rubs    GI:  Soft, non distended, non tender. normoactive bowel sounds, no hepatosplenomegaly   PEG in place. Musculoskeletal:  No edema, warm, 2+ pulses throughout    Neurologic: Awake,flat     Eyes:  EOMI. Anicteric sclerae, PERRL. Data Review:    Review and/or order of clinical lab test      Labs:     Recent Labs      09/01/17   1116   WBC  10.8   HGB  8.3*   HCT  25.6*   PLT  229     Recent Labs      09/01/17   0300  08/31/17   0626  08/30/17   0515   NA  132*  133*  133*   K  3.8  3.9  3.7   CL  95*  97  97   CO2  28  26  24   BUN  60*  57*  55*   CREA  5.90*  5.47*  5.14*   GLU  151*  126*  123*   CA  7.9*  7.8*  7.7*     No results for input(s): SGOT, GPT, ALT, AP, TBIL, TBILI, TP, ALB, GLOB, GGT, AML, LPSE in the last 72 hours. No lab exists for component: AMYP, HLPSE  No results for input(s): INR, PTP, APTT in the last 72 hours. No lab exists for component: INREXT, INREXT   No results for input(s): FE, TIBC, PSAT, FERR in the last 72 hours. No results found for: FOL, RBCF   No results for input(s): PH, PCO2, PO2 in the last 72 hours. No results for input(s): CPK, CKNDX, TROIQ in the last 72 hours.     No lab exists for component: CPKMB  No results found for: CHOL, CHOLX, CHLST, CHOLV, HDL, LDL, LDLC, DLDLP, TGLX, TRIGL, TRIGP, CHHD, CHHDX  Lab Results   Component Value Date/Time    Glucose (POC) 143 09/01/2017 05:37 PM    Glucose (POC) 169 09/01/2017 12:13 PM    Glucose (POC) 142 09/01/2017 07:59 AM    Glucose (POC) 141 08/31/2017 05:53 PM    Glucose (POC) 137 08/30/2017 11:53 AM     Lab Results   Component Value Date/Time    Color YELLOW/STRAW 08/22/2017 12:14 PM    Appearance TURBID 08/22/2017 12:14 PM    Specific gravity 1.010 08/22/2017 12:14 PM    pH (UA) 7.0 08/22/2017 12:14 PM    Protein 100 08/22/2017 12:14 PM    Glucose NEGATIVE  08/22/2017 12:14 PM    Ketone NEGATIVE  08/22/2017 12:14 PM    Bilirubin NEGATIVE  08/22/2017 12:14 PM    Urobilinogen 0.2 08/22/2017 12:14 PM    Nitrites NEGATIVE  08/22/2017 12:14 PM    Leukocyte Esterase LARGE 08/22/2017 12:14 PM    Epithelial cells FEW 08/22/2017 12:14 PM    Bacteria 4+ 08/22/2017 12:14 PM    WBC  08/22/2017 12:14 PM    RBC 20-50 08/22/2017 12:14 PM Medications Reviewed:     Current Facility-Administered Medications   Medication Dose Route Frequency    pantoprazole (PROTONIX) 2 mg/mL oral suspension 40 mg  40 mg PEG Tube ACB    alteplase (CATHFLO) 1 mg in sterile water (preservative free) 1 mL injection  1 mg InterCATHeter PRN    bacitracin 500 unit/gram packet 1 Packet  1 Packet Topical PRN    sodium chloride (NS) flush 10-30 mL  10-30 mL InterCATHeter PRN    sodium chloride (NS) flush 10 mL  10 mL InterCATHeter Q24H    sodium chloride (NS) flush 10 mL  10 mL InterCATHeter PRN    sodium chloride (NS) flush 10-40 mL  10-40 mL InterCATHeter Q8H    sodium chloride (NS) flush 20 mL  20 mL InterCATHeter Q24H    meropenem (MERREM) 500 mg in 0.9% sodium chloride (MBP/ADV) 50 mL  0.5 g IntraVENous Q12H    dextrose (D50W) injection syrg 25 g  25 g IntraVENous PRN    0.9% sodium chloride infusion 250 mL  250 mL IntraVENous PRN    fentaNYL citrate (PF) injection 25 mcg  25 mcg IntraVENous Q1H PRN    gelatin adsorbable (GELFOAM) 12-7 mm sponge   Topical PRN    sodium chloride (NS) flush 5-10 mL  5-10 mL IntraVENous Q8H    sodium chloride (NS) flush 5-10 mL  5-10 mL IntraVENous PRN    acetaminophen (TYLENOL) tablet 650 mg  650 mg Oral Q4H PRN    HYDROcodone-acetaminophen (NORCO) 5-325 mg per tablet 1 Tab  1 Tab Oral Q4H PRN    ondansetron (ZOFRAN ODT) tablet 4 mg  4 mg Oral Q4H PRN    heparin (porcine) injection 5,000 Units  5,000 Units SubCUTAneous Q8H    acetaminophen (TYLENOL) suppository 650 mg  650 mg Rectal Q4H PRN    albuterol-ipratropium (DUO-NEB) 2.5 MG-0.5 MG/3 ML  3 mL Nebulization Q6H RT    aspirin chewable tablet 81 mg  81 mg Per G Tube DAILY    collagenase (SANTYL) 250 unit/gram ointment   Topical DAILY     ______________________________________________________________________  EXPECTED LENGTH OF STAY: 4d 21h  ACTUAL LENGTH OF STAY:          10                 Collette Hamburg, MD

## 2017-09-01 NOTE — PROGRESS NOTES
Received verbal order to place a PICC line in anticipation of patient's discharge. MD stated that patient has ESRD and is not a candidate for Dialysis, but needs this PICC placed. Patient will need 2 more weeks of antibiotics.

## 2017-09-01 NOTE — PROGRESS NOTES
Attempted PICC placement on the left using both basilic and brachial veins and was able to access basilic vein however wire wouldn't thread only 5 centimeters. Unable to access basilic vein. Very small. Case aborted and to attempt Midline placement on the right.

## 2017-09-01 NOTE — PROGRESS NOTES
Bedside and Verbal shift change report given to [address] [place] [address] (oncoming nurse) by Adam Gregory (offgoing nurse). Report included the following information SBAR, Kardex and Recent Results.

## 2017-09-01 NOTE — PROGRESS NOTES
Problem: Falls - Risk of  Goal: *Absence of Falls  Document James Fall Risk and appropriate interventions in the flowsheet.    Outcome: Progressing Towards Goal  Fall Risk Interventions:        Mentation Interventions: Adequate sleep, hydration, pain control, Bed/chair exit alarm, Evaluate medications/consider consulting pharmacy, Familiar objects from home, Family/sitter at bedside, More frequent rounding, Toileting rounds, Update white board     Medication Interventions: Bed/chair exit alarm, Evaluate medications/consider consulting pharmacy     Elimination Interventions: Bed/chair exit alarm, Call light in reach, Patient to call for help with toileting needs, Toileting schedule/hourly rounds

## 2017-09-01 NOTE — PROCEDURES
Midline Insertion and Progress Note    PRE-PROCEDURE VERIFICATION  Correct Procedure: yes  Correct Site:  yes  Temperature: Temp: 98.2 °F (36.8 °C), Temperature Source: Temp Source: Oral  Recent Labs      09/01/17   1116  09/01/17   0300   BUN   --   60*   CREA   --   5.90*   PLT  229   --    WBC  10.8   --      Allergies: Review of patient's allergies indicates no known allergies. PROCEDURE DETAIL  A 4fr single lumen midline was started for antibiotic therapy and Home IV Therapy. The following documentation is in addition to the Midline properties in the lines/airways flowsheet :  Lot #: [id number]  Catheter Total Length: 10 (cm) Circumference 24 cm  Vein Selection for Midline :right basilic  Complication Related to Insertion: none  A copy of care and maintainance of the Midline instructions left at bedside. Handoff done with Breanna Oseguera RN and midline placement discussed.     Line is okay to use: yes  Nina Husain RN  Vascular Access Team

## 2017-09-01 NOTE — PROGRESS NOTES
[facility]  IV Orders  1. Diagnosis:  Bacteremia  2. Routine PICC care  3. Antibiotic:  Ertapenem 500 mg IV Q 24 hours  4. Lab each Monday:   CBC/diff/platelets  5. Lab each Thursday:   CBC/diff/platelets  6.   Fax lab to Dr. Johan Brown @ [phone].  7.  Duration of therapy: 13 days and then remove PICC    Concetta Monet MD

## 2017-09-01 NOTE — PROGRESS NOTES
Palliative Medicine Consult  Doron: [phone]    Patient Name: Merlene Shane  YOB: 1933    Date of Initial Consult: 8/23/17  Reason for Consult: Care decisions   Requesting Provider: Abelardo Wolfe   Primary Care Physician: Nickie Saint, MD      SUMMARY:   Merlene Shane is a 80 y.o. with a past history of CAD s/p CABG, HTN, DM, prostate cancer, possible CKD who was admitted on 8/22/2017 from [address] [place] [address] with fever and hypotension. Discussed hx w/ wife. As of April this year, pt and wife were living together in apartment- he was able to do his ADLs w/ some assistance but then had a hospital stay at the [facility], then went to [facility]. Soon afteerwards pt had prolonged hospitalization at [facility] on 6/4/17 w/ sepsis during which time was intubated and sedated. Could not be weaned from vent, s/p trach and PEG. This admission found to have GNR UTI and bacteremia on IV abx, as well as FREDERICK w/ hyperkalemia- s/p emergent dialysis. Noted that pt would be a poor long term dialysis candidate. Current medical issues leading to Palliative Medicine involvement include: care decisions. Apparently had hospice at [facility]), remained full code. At this time goals are clear w/ nhung Jha to cont current measures but no escalation of care. PALLIATIVE DIAGNOSES:   1. Shortness of breath, improved   2. Feeding difficulties, on PEG  3. Mult medical issues as above, sepsis, FREDERICK on CKD  4. Debility   5. Sacral decub      PLAN:   1. Nhung Jha continues to remain optimistic about pt's situation and to have many concerns about any NH. Feels that pt's infections were obtained because of the care at these [facility]. Again discuss that pt w/ mult medical conditions and is at risk for future infection, decline no matter where he is - and that given renal function and other issues remains appropriate for hospice care again.    2. As previous [facility] have confirmed, wife tends to go back/forth with some of her decisions. 3. Goals are to cont current care- will require IV abx if goes to NH soon- thus could obtain hospice AFTER the IV abx stop, as wife does wish to continue for full course. 4. Cr cont to rise, so at some point pt may be appropriate for IP hospice, but this is not want wife wants. 5. Goals clear for SNF/NH w/ IV abx until course completed and then enroll in hospice. 6. As goals clear, DDNR signed, and wife feels supported- following only peripherally at this time. Please call w/ specific questions/concerns. 7. Communicated plan of care with: Palliative IDT       GOALS OF CARE / TREATMENT PREFERENCES:   [====Goals of Care====]  GOALS OF CARE:  Patient / health care proxy stated goals: recovery as possible    TREATMENT PREFERENCES:   Code Status: DNR-DDNR on file     Advance Care Planning:  Advance Care Planning 8/28/2017   Patient's Healthcare Decision Maker is: Legal Next of Nilton Guzman   Primary Decision Maker Name Dior Gonzalez   Primary Decision Maker Relationship to Patient Spouse   Confirm Advance Directive -   Patient Would Like to Complete Advance Directive -   Does the patient have other document types Do Not Resuscitate       Other:    The palliative care team has discussed with patient / health care proxy about goals of care / treatment preferences for patient.  [====Goals of Care====]         HISTORY:         HPI/SUBJECTIVE:    The patient is:   [] Verbal and participatory  [x] Non-participatory due to: medical condition     Pt w/ eyes open, mouthing words. Wife Darliss Ousmane at bedside holding his hand.      Clinical Pain Assessment (nonverbal scale for severity on nonverbal patients):   [++++ Clinical Pain Assessment++++]  [++++Pain Severity++++]: Pain: 0  [++++Pain Character++++]:   [++++Pain Duration++++]:   [++++Pain Effect++++]:   [++++Pain Factors++++]:   [++++Pain Frequency++++]:   [++++Pain Location++++]:   [++++ Clinical Pain Assessment++++]  Duration: for how long has pt been experiencing pain (e.g., 2 days, 1 month, years)  Frequency: how often pain is an issue (e.g., several times per day, once every few days, constant)     FUNCTIONAL ASSESSMENT:     Palliative Performance Scale (PPS):  PPS: 20       PSYCHOSOCIAL/SPIRITUAL SCREENING:     Advance Care Planning:  Advance Care Planning 8/28/2017   Patient's Healthcare Decision Maker is: Legal Next of Nilton Guzman   Primary Decision Maker Name Jhonny Hawkins   Primary Decision Maker Relationship to Patient Spouse   Confirm Advance Directive -   Patient Would Like to Complete Advance Directive -   Does the patient have other document types Do Not Resuscitate        Any spiritual / Anglican concerns:  [] Yes /  [x] No    Caregiver Burnout:  [] Yes /  [x] No /  [] No Caregiver Present      Anticipatory grief assessment:   [x] Normal  / [] Maladaptive       ESAS Anxiety:   Cannot obtain due to patient factors    ESAS Depression:   Cannot obtain due to patient factors         REVIEW OF SYSTEMS:     Positive and pertinent negative findings in ROS are noted above in HPI. The following systems were [] reviewed / [x] unable to be reviewed as noted in HPI  Other findings are noted below. Systems: constitutional, ears/nose/mouth/throat, respiratory, gastrointestinal, genitourinary, musculoskeletal, integumentary, neurologic, psychiatric, endocrine. Positive findings noted below. Modified ESAS Completed by: provider   Fatigue: 10 Drowsiness: 10     Pain: 0           Dyspnea: 0     Constipation: No     Stool Occurrence(s): 1        PHYSICAL EXAM:     From RN flowsheet:  Wt Readings from Last 3 Encounters:   09/01/17 155 lb 10.3 oz (70.6 kg)     Blood pressure 140/79, pulse (!) 106, temperature 98 °F (36.7 °C), resp. rate 20, height 6' (1.829 m), weight 155 lb 10.3 oz (70.6 kg), SpO2 97 %.     Pain Scale 1: Adult Nonverbal Pain Scale  Pain Intensity 1: 0                 Constitutional: pale, chronically ill appearing, temporal wasting   Eyes: pupils equal, anicteric  ENMT: no nasal discharge, moist mucous membranes  Respiratory: breathing not labored, trach   Gastrointestinal: soft, PEG  Musculoskeletal: no deformity, muscle wasting   Skin: sacral decub, did not examine   Neurologic: opens his eyes to voice and mouths words       HISTORY:     Active Problems:    UTI (urinary tract infection) due to urinary indwelling catheter ([address] (8/22/2017)      Sepsis affecting skin (8/22/2017)      Past Medical History:   Diagnosis Date    CAD (coronary artery disease)     Hx of CABG       History reviewed. No pertinent surgical history. History reviewed. No pertinent family history. History reviewed, no pertinent family history.   Social History   Substance Use Topics    Smoking status: Not on file    Smokeless tobacco: Not on file    Alcohol use Not on file     No Known Allergies   Current Facility-Administered Medications   Medication Dose Route Frequency    pantoprazole (PROTONIX) 2 mg/mL oral suspension 40 mg  40 mg PEG Tube ACB    alteplase (CATHFLO) 1 mg in sterile water (preservative free) 1 mL injection  1 mg InterCATHeter PRN    bacitracin 500 unit/gram packet 1 Packet  1 Packet Topical PRN    sodium chloride (NS) flush 10-30 mL  10-30 mL InterCATHeter PRN    sodium chloride (NS) flush 10 mL  10 mL InterCATHeter Q24H    sodium chloride (NS) flush 10 mL  10 mL InterCATHeter PRN    sodium chloride (NS) flush 10-40 mL  10-40 mL InterCATHeter Q8H    sodium chloride (NS) flush 20 mL  20 mL InterCATHeter Q24H    meropenem (MERREM) 500 mg in 0.9% sodium chloride (MBP/ADV) 50 mL  0.5 g IntraVENous Q12H    dextrose (D50W) injection syrg 25 g  25 g IntraVENous PRN    0.9% sodium chloride infusion 250 mL  250 mL IntraVENous PRN    fentaNYL citrate (PF) injection 25 mcg  25 mcg IntraVENous Q1H PRN    gelatin adsorbable (GELFOAM) 12-7 mm sponge   Topical PRN    sodium chloride (NS) flush 5-10 mL  5-10 mL IntraVENous Q8H    sodium chloride (NS) flush 5-10 mL  5-10 mL IntraVENous PRN    acetaminophen (TYLENOL) tablet 650 mg  650 mg Oral Q4H PRN    HYDROcodone-acetaminophen (NORCO) 5-325 mg per tablet 1 Tab  1 Tab Oral Q4H PRN    ondansetron (ZOFRAN ODT) tablet 4 mg  4 mg Oral Q4H PRN    heparin (porcine) injection 5,000 Units  5,000 Units SubCUTAneous Q8H    acetaminophen (TYLENOL) suppository 650 mg  650 mg Rectal Q4H PRN    albuterol-ipratropium (DUO-NEB) 2.5 MG-0.5 MG/3 ML  3 mL Nebulization Q6H RT    aspirin chewable tablet 81 mg  81 mg Per G Tube DAILY    collagenase (SANTYL) 250 unit/gram ointment   Topical DAILY          LAB AND IMAGING FINDINGS:     Lab Results   Component Value Date/Time    WBC 8.7 08/29/2017 05:10 AM    HGB 7.5 08/29/2017 05:10 AM    PLATELET 422 25/95/8506 05:10 AM     Lab Results   Component Value Date/Time    Sodium 132 09/01/2017 03:00 AM    Potassium 3.8 09/01/2017 03:00 AM    Chloride 95 09/01/2017 03:00 AM    CO2 28 09/01/2017 03:00 AM    BUN 60 09/01/2017 03:00 AM    Creatinine 5.90 09/01/2017 03:00 AM    Calcium 7.9 09/01/2017 03:00 AM    Magnesium 1.8 08/27/2017 04:50 AM    Phosphorus 4.7 08/27/2017 04:50 AM      Lab Results   Component Value Date/Time    AST (SGOT) 893 08/22/2017 10:00 AM    Alk.  phosphatase 333 08/22/2017 10:00 AM    Protein, total 8.5 08/22/2017 10:00 AM    Albumin 1.7 08/22/2017 10:00 AM    Globulin 6.8 08/22/2017 10:00 AM     Lab Results   Component Value Date/Time    INR 1.3 08/23/2017 04:21 AM    Prothrombin time 13.8 08/23/2017 04:21 AM    aPTT 36.3 08/23/2017 04:21 AM      No results found for: IRON, FE, TIBC, IBCT, PSAT, FERR   No results found for: PH, PCO2, PO2  No components found for: [place]   Lab Results   Component Value Date/Time    CK 25 08/22/2017 01:09 PM                Total time: 25 min   Counseling / coordination time, spent as noted above: 20 min   > 50% counseling / coordination?: yes    Prolonged service was provided for  []30 min []75 min in face to face time in the presence of the patient, spent as noted above. Time Start:   Time End:   Note: this can only be billed with 10711 (initial) or 00215 (follow up). If multiple start / stop times, list each separately.

## 2017-09-01 NOTE — PROGRESS NOTES
RENAL  PROGRESS NOTE        Subjective:    COMPLAINT: Eyes open. Not talking. Objective:   VITALS SIGNS:    Visit Vitals    /79 (BP 1 Location: Left arm, BP Patient Position: At rest)    Pulse (!) 106    Temp 98 °F (36.7 °C)    Resp 20    Ht 6' (1.829 m)    Wt 70.6 kg (155 lb 10.3 oz)    SpO2 97%    BMI 21.11 kg/m2       O2 Device: Tracheal collar   O2 Flow Rate (L/min): 8 l/min   Temp (24hrs), Av.1 °F (36.7 °C), Min:98 °F (36.7 °C), Max:98.2 °F (36.8 °C)         PHYSICAL EXAM:  No edema  Open eyes  abd soft    DATA REVIEW:     INTAKE / OUTPUT:   Last shift:         Last 3 shifts:  1901 -  0700  In: 9960   Out: 150 [Urine:150]    Intake/Output Summary (Last 24 hours) at 17 1134  Last data filed at 17 0400   Gross per 24 hour   Intake             1036 ml   Output                0 ml   Net             1036 ml         LABS:   No results for input(s): WBC, HGB, HCT, PLT, HGBEXT, HCTEXT, PLTEXT, HGBEXT, HCTEXT, PLTEXT in the last 72 hours. Recent Labs      17   0300  17   0626  17   0515   NA  132*  133*  133*   K  3.8  3.9  3.7   CL  95*  97  97   CO2  28  26  24   GLU  151*  126*  123*   BUN  60*  57*  55*   CREA  5.90*  5.47*  5.14*   CA  7.9*  7.8*  7.7*           Assessment:   FREDERICK, likely septic ATN/hypotension with chronic gutierrez, s.p urgent dialysis on 17 and again on 17  kimberly on 17  CKD ? Baseline  prostate CA as per wife  Severe hyperkalemia, resolved s.p urgent dialysis  Septic shock;gram neg rods bacteremia likely from urospesis  VDRF  Cachexia  Anemia as per first team  Plan:      As per discussion with wife- no HD and no escalation of care. Will see again Monday if he is still here.          Kishan Garcia MD

## 2017-09-01 NOTE — PROGRESS NOTES
YADI spoke Dr. Karla Hanson this afternoon. He stated that he is planning to discharge this pt tomorrow to the SNF. YAD[facility]  [facility] at [facility] and [address] [place] [address] ([phone]). She agreed that she can accept this pt tomorrow. She asked that the main number be called [phone] tomorrow and ask for Unit 2. The d/c information should be faxed to [phone]. YADI spoke with pt's wife to inform her of above, and she is in agreement with pending d/c for tomorrow.  Martha Hughes

## 2017-09-01 NOTE — PROGRESS NOTES
CM called pt's wife, Ms. Maykel Cardenas ([phone]) to confirm that she wants to accept the bed at [facility] and she does. CM called admissions at [facility] and left a voice mail message asking a member of the admissions team to call this CM. CM also called Paz Nur in admissions at the [address] [place] [address] (where pt came from) to ask if she has pt's UAI (medicaid pre-screening). Pt's new facility will need this documentation. They are to get back with this CM .  Karsten Sepulveda

## 2017-09-01 NOTE — INTERDISCIPLINARY ROUNDS
IDR/SLIDR Summary          Patient: Aleksandr George MRN: [id number]    Age: 80 y.o. YOB: 1933 Room/Bed: [facility]   Admit Diagnosis: Sepsis affecting skin  UTI (urinary tract infection) due to urinary indwelling catheter (HCC)  Principal Diagnosis: <principal problem not specified>   Goals: Safety, D/c planning  Readmission: NO  Quality Measure: Not applicable  VTE Prophylaxis: Chemical  Influenza Vaccine screening completed? YES  Pneumococcal Vaccine screening completed? YES  Mobility needs: Yes   Nutrition plan:Yes  Consults:    Financial concerns:No  Escalated to CM? YES  RRAT Score: 15   Interventions:Palliative Care   Testing due for pt today?  NO  LOS: 10 days Expected length of stay > or = 2 days  Discharge plan: TBD   PCP: Gideon Araya MD  Transportation needs: Yes    Days before discharge:two or more days before discharge   Discharge disposition: TBD    Signed:     Larry Vaughan RN  9/1/2017  4:52 AM

## 2017-09-01 NOTE — PROGRESS NOTES
Bedside shift change report given to Betzy Gray RN (oncoming nurse) by Tressa Grace RN (offgoing nurse).  Report included the following information SBAR, Kardex, ED Summary, Procedure Summary, Intake/Output, MAR, Accordion, Recent Results and Cardiac Rhythm ST.

## 2017-09-01 NOTE — PROGRESS NOTES
09/01/17 0300   Vital Signs   Temp 98.2 °F (36.8 °C)   Temp Source Oral   Pulse (Heart Rate) (!) 114   Heart Rate Source Monitor   Cardiac Rhythm Sinus Tach   Resp Rate 20   O2 Sat (%) 96 %   Level of Consciousness Alert   /75   MAP (Calculated) 95   BP 1 Method Automatic   BP 1 Location Left arm   BP Patient Position At rest   MEWS Score 3   Alarms Set and Audible Cardiac alarms   Box Number 654   Electrodes Replaced No   Pain 1   Pain Scale 1 Adult Nonverbal Pain Scale   Pain Intensity 1 0   Patient Stated Pain Goal 0   Pain Reassessment 1 Yes   Adult Nonverbal Pain Scale   Face 0   Activity (Movement) 0   Guarding 0   Physiology (Vital Signs) 0   Respiratory 0   Total Score 0   Oxygen Therapy   O2 Device Tracheal collar   O2 Flow Rate (L/min) 8 l/min   Patient Observation   Patient Turned Turn on left side   Ambulate No (Comment)   Activity In bed;Resting quietly; Family/Visitors present   Mode of Transportation Stretcher;Oxygen   Height/Weight   Weight 70.6 kg (155 lb 10.3 oz)   Weight Source Bed   BMI (calculated) 21.1   MEWS score a 3 due to elevated HR. MD aware. Will continue to monitor.

## 2017-09-02 VITALS
HEIGHT: 72 IN | BODY MASS INDEX: 20.54 KG/M2 | RESPIRATION RATE: 20 BRPM | HEART RATE: 98 BPM | SYSTOLIC BLOOD PRESSURE: 139 MMHG | TEMPERATURE: 97.4 F | DIASTOLIC BLOOD PRESSURE: 75 MMHG | WEIGHT: 151.68 LBS | OXYGEN SATURATION: 100 %

## 2017-09-02 PROBLEM — T83.511A UTI (URINARY TRACT INFECTION) DUE TO URINARY INDWELLING CATHETER (HCC): Status: RESOLVED | Noted: 2017-08-22 | Resolved: 2017-09-02

## 2017-09-02 PROBLEM — N39.0 UTI (URINARY TRACT INFECTION) DUE TO URINARY INDWELLING CATHETER (HCC): Status: RESOLVED | Noted: 2017-08-22 | Resolved: 2017-09-02

## 2017-09-02 PROBLEM — A41.9 SEPSIS AFFECTING SKIN: Status: RESOLVED | Noted: 2017-08-22 | Resolved: 2017-09-02

## 2017-09-02 LAB
ANION GAP SERPL CALC-SCNC: 13 MMOL/L (ref 5–15)
BUN SERPL-MCNC: 62 MG/DL (ref 6–20)
BUN/CREAT SERPL: 10 (ref 12–20)
CALCIUM SERPL-MCNC: 7.6 MG/DL (ref 8.5–10.1)
CHLORIDE SERPL-SCNC: 96 MMOL/L (ref 97–108)
CO2 SERPL-SCNC: 25 MMOL/L (ref 21–32)
CREAT SERPL-MCNC: 6.39 MG/DL (ref 0.7–1.3)
GLUCOSE SERPL-MCNC: 112 MG/DL (ref 65–100)
POTASSIUM SERPL-SCNC: 4.4 MMOL/L (ref 3.5–5.1)
SODIUM SERPL-SCNC: 134 MMOL/L (ref 136–145)

## 2017-09-02 PROCEDURE — 74011250636 HC RX REV CODE- 250/636: Performed by: INTERNAL MEDICINE

## 2017-09-02 PROCEDURE — 74011250637 HC RX REV CODE- 250/637: Performed by: INTERNAL MEDICINE

## 2017-09-02 PROCEDURE — 36415 COLL VENOUS BLD VENIPUNCTURE: CPT | Performed by: HOSPITALIST

## 2017-09-02 PROCEDURE — 74011000258 HC RX REV CODE- 258: Performed by: SURGERY

## 2017-09-02 PROCEDURE — 80048 BASIC METABOLIC PNL TOTAL CA: CPT | Performed by: HOSPITALIST

## 2017-09-02 PROCEDURE — 74011250636 HC RX REV CODE- 250/636: Performed by: SURGERY

## 2017-09-02 PROCEDURE — 74011000250 HC RX REV CODE- 250: Performed by: INTERNAL MEDICINE

## 2017-09-02 PROCEDURE — 94640 AIRWAY INHALATION TREATMENT: CPT

## 2017-09-02 PROCEDURE — 77010033678 HC OXYGEN DAILY

## 2017-09-02 RX ADMIN — COLLAGENASE SANTYL: 250 OINTMENT TOPICAL at 09:44

## 2017-09-02 RX ADMIN — Medication 20 ML: at 12:00

## 2017-09-02 RX ADMIN — Medication 10 ML: at 12:00

## 2017-09-02 RX ADMIN — ASPIRIN 81 MG 81 MG: 81 TABLET ORAL at 09:43

## 2017-09-02 RX ADMIN — MEROPENEM 500 MG: 500 INJECTION, POWDER, FOR SOLUTION INTRAVENOUS at 09:43

## 2017-09-02 RX ADMIN — Medication 10 ML: at 05:55

## 2017-09-02 RX ADMIN — Medication 10 ML: at 05:53

## 2017-09-02 RX ADMIN — HEPARIN SODIUM 5000 UNITS: 5000 INJECTION, SOLUTION INTRAVENOUS; SUBCUTANEOUS at 07:00

## 2017-09-02 RX ADMIN — IPRATROPIUM BROMIDE AND ALBUTEROL SULFATE 3 ML: .5; 3 SOLUTION RESPIRATORY (INHALATION) at 07:56

## 2017-09-02 RX ADMIN — PANTOPRAZOLE SODIUM 40 MG: 40 TABLET, DELAYED RELEASE ORAL at 07:32

## 2017-09-02 NOTE — PROGRESS NOTES
CM received a call from [address] ([phone]) in admissions at [address] She confirmed that she can accept this pt pending obtaining a humidifier for pt's trache. She stated that she is working on getting a humidifier for this pt's trache. She will contact this CM when she obtains one. YADI sent a referral for an ALS ambulance to [facility] for a will call.  Thao Estrada

## 2017-09-02 NOTE — PROGRESS NOTES
YADI received a call from Nydia Dunn at [facility]. She stated that they have obtained a humidifier for this patient and can take him today. Yadi had set up a will call for ALS ambulance with [facility] earlier today. CM called [facility] and they are going to pick pt up at 1pm today. CM called pt's wife to inform her and she is in agreement. CM faxed d/c paperwork to [facility]. An envelope containing pt's kardex, MARs and AVS will be sent with pt to the facility. Also, pt's nurse will call report.  Nona Frederick

## 2017-09-02 NOTE — DISCHARGE INSTRUCTIONS
Discharge SNF/Rehab Instructions/LTAC       PATIENT ID: Lora Morgan  MRN: [id number]   YOB: 1933    DATE OF ADMISSION: 8/22/2017  9:43 AM    DATE OF DISCHARGE: 9/2/2017    PRIMARY CARE PROVIDER: Loly Madera MD       ATTENDING PHYSICIAN: Brisa Keys MD  DISCHARGING PROVIDER: Brisa Keys MD     To contact this individual call [phone] and ask the  to page. If unavailable ask to be transferred the Adult Hospitalist Department. CONSULTATIONS: IP CONSULT TO HOSPITALIST  IP CONSULT TO NEPHROLOGY  IP CONSULT TO NEPHROLOGY  IP CONSULT TO INTERVENTIONAL RADIOLOGY  IP CONSULT TO PALLIATIVE CARE - PROVIDER  IP CONSULT TO PALLIATIVE CARE - PROVIDER  IP CONSULT TO INFECTIOUS DISEASES  IP CONSULT TO GENERAL SURGERY    PROCEDURES/SURGERIES: * No surgery found *    [id number] [facility] COURSE:     Admission Summary:      The patient is an 66-year-old patient who originally was a resident of [place], [address] [place] [address]. He   was admitted in [facility] on 06/04/2017 due to hypotension, hyperventilation and confusion. Since the patient was unable to be extubated while in the care in the ICU, then tracheostomy and PEG was done and [facility] he had transferred to skilled nursing facility where he came from this admission. On the day of admission,in the morning the patient was noted to be in severe  shortness of breath, for which we will transfer the patient to our ER. While in our ER, the patient was connected to the ventilator through the tracheostomy. Subsequently he was diagnosed with wide spread ESBL E coli infection that involved the blood(bacteremia), lungs and urine. UTI being the most probable source. Assessment & Plan:      Sepsis with septic shock with ESBL E coli bacteremia,pneumoni and UTI. The UTI the most probable source.   -Patient required ICU care.Shock resolved and was transferred to a tele floor where he stayed to the date of discharge.  -Infectious disease specialist recommended to continue intravenous antibiotics with Ertapenem. Repeat blood culture negative thus far  -Midline was placed on 9/1/17,a day prior to discharge. SNF  IV Orders  1. Diagnosis:  Bacteremia  2. Routine PICC care  3. Antibiotic:  Ertapenem 500 mg IV Q 24 hours  4. Lab each Monday:                        CBC/diff/platelets  5. Lab each Thursday:                        CBC/diff/platelets  6. Fax lab to Dr. Stacy Humphries @ [phone].  7.  Duration of therapy: 13 days and then remove PICC  Kd Wagner MD       ESBL E coli pneumonia: see above      Acute and chronic renal failure,due to hypotension from sepsis/ATN  -Patient required urgent dialysis on 8.22.17 and again on 8.24.17.Rusty placed on 8.22.17. After nephrology and palliative care discussions with patient's wife,no dialysis or escalation of care so dialysis therapy was stopped. Creatinine has been rising since and wife would consider hospice once in skilled nursing place. Hyperkalemic, hypernatremic, dehydration   Improved. Acute on chronic hypoxic Respiratory failure likely due to sepsis/shock/pneumonia  -Patient had tracheostomy prior to admission and was put on mechanical ventilation on admission. He was subsequently extubated to Trach collar and remained stable respirator wise on trach collar. Coronary artery disease   Status post CABG. No acute issue. Decubital ulcer -PROVIDENCIA STUARTII    Also sensitive to meropenem. Surgical services consulted, no debridement planned per surgery. Anemia   Possibly from chronic disease monitor,he had one unit on 8/22. Hb stable between 7-8 range. Severe Protein Calorie Malnutrition  -On Tube feeding      Code status: DNR    Prognosis:poor. With multiple several co morbidities,recent extensive hospitalization,now failing kidneys without signs of recovery. There was extensive discussion about course and prognosis with patient's wife by primary team, nephrologist and palliative care team,the goals of care moving forward are,per palliative care team  \"Goals clear for SNF/NH w/ IV abx until course completed and then enroll in hospice. \"        DISCHARGE DIAGNOSES / PLAN:      See above       PENDING TEST RESULTS:   At the time of discharge the following test results are still pending: final blood culture,negative to date for 3 days    FOLLOW UP APPOINTMENTS:    Follow-up Information     Follow up With Details Comments [address] [place] [address] MD Yoselyn Bajwaheathermariama [facility]  [phone]             ADDITIONAL CARE RECOMMENDATIONS:     DIET: NPO,on tube feeding via PEG tube      TUBE FEEDING INSTRUCTIONS:Suplena @ 38ml/hr continuous around the clock + 160ml q4hr of free water flush via PEG    OXYGEN / BiPAP SETTINGS via tach collar    ACTIVITY: Activity as tolerated    WOUND CARE:      1. POA, right upper back unstageable pressure injury (wife reports it has been present for about 4 months) = 1.5 x 1.3 x 0.1 cm  100% moist adherent yellow. no exudate. Periwound intact & without erythema. Cleaned with saline, Santyl and Mepilex border applied. 2. POA, sacral stage 4 pressure injury = 7.5 x 8 x 1 cm with undermining from 8-12 o'clock = 2.5 cm Base is 70% moist deep/bright pink 30% scattered yellow with some connective tissue noted. No erupting bone palpated but not far from the surface. No odor. Periwound intact and shiny, blanching pink. Scant serusanguious exudate. Devitalized margin proximally. Cleaned with saline, Santyl applied to base and then moist packing placed. Covered with large adhesive island dressing to protect form stool . 3. Left dorsal wrist skin tear = 2.5 x 2 x 0.1 cm 100% moist pink with scant bleeding.     Cleaned with saline, petroleum ointment applied and covered with Mepilex border. 4. Chest toward left side, ulceration with unknown etiology (sternal wires are readily palpable under skin) = 1.3 x 1.3 x 0.2 cm 100% moist red with scant bleeding. Periwound intact and without erythema. Cleaned with saline, Aquacel Ag applied with a few drops of saline to activate silver, covered with Mepilex border. Recommendations:    Back and sacrum: apply Santyl daily. Cover back with Mepilex border and sacrum with moist packing and dry cover. Chest: apply Aquacel Ag every three days with a few drops of saline to moisten, dry cover. Left wrist: every three days apply petroleum ointment and Mepilex border     Skin Care & Pressure Prevention:  Turn/reposition approximately every 2 hours and offload heels. Manage incontinence  Total Care Sport: Use only flat sheet and one incontinence pad. DISCHARGE MEDICATIONS:   See Medication Reconciliation Form      NOTIFY YOUR PHYSICIAN FOR ANY OF THE FOLLOWING:   Fever over 101 degrees for 24 hours. Chest pain, shortness of breath, fever, chills, nausea, vomiting, diarrhea, change in mentation, falling, weakness, bleeding. Severe pain or pain not relieved by medications. Or, any other signs or symptoms that you may have questions about. DISPOSITION:    Home With:   OT  PT  HH  RN      x SNF/Inpatient Rehab/LTAC    Independent/assisted living    Hospice    Other:       PATIENT CONDITION AT DISCHARGE:     Functional status   x Poor     Deconditioned     Independent      Cognition     Lucid    x Forgetful     Dementia      Catheters/lines (plus indication)   x Rizo    x Midline   x PEG     None      Code status     Full code    x DNR      PHYSICAL EXAMINATION AT DISCHARGE:         Constitutional: Awake,flat. Not in distress   ENT:  Trach collar in place. Resp:  CTA bilaterally. No wheezing/rhonchi/rales. No accessory muscle use   CV:  Regular rhythm, normal rate, no murmurs, gallops, rubs    GI:  Soft, non distended, non tender.  normoactive bowel sounds, no hepatosplenomegaly   PEG in place. Musculoskeletal:  No edema, warm, 2+ pulses throughout   Skin  multiple ulcers. Please see above    Neurologic: Awake,flat                                             Eyes:  EOMI. Anicteric sclerae, PERRL. CHRONIC MEDICAL DIAGNOSES:  Problem List as of 9/2/2017  Never Reviewed          Codes Class Noted - Resolved    RESOLVED: UTI (urinary tract infection) due to urinary indwelling catheter ([facility] ICD-10-CM: T83.511A, N39.0  ICD-9-CM: 996.64, 599.0  8/22/2017 - 9/2/2017        RESOLVED: Sepsis affecting skin ICD-10-CM: L02.91  ICD-9-CM: 682.9  8/22/2017 - 9/2/2017                    Signed:    Andressa Marquez MD  9/2/2017  7:58 AM

## 2017-09-02 NOTE — PROGRESS NOTES
I have reviewed discharge instructions with the patient. The patient is unable to discuss discharge instructions due to mental status. Report  Given to Machelle at [address] and rehab.

## 2017-09-02 NOTE — INTERDISCIPLINARY ROUNDS
IDR/SLIDR Summary          Patient: Hillary Hermosillo MRN: [id number]    Age: 80 y.o. YOB: 1933 Room/Bed: [facility]   Admit Diagnosis: Sepsis affecting skin  UTI (urinary tract infection) due to urinary indwelling catheter (HCC)  Principal Diagnosis: <principal problem not specified>   Goals: IV ABX  Readmission: NO  Quality Measure: Not applicable  VTE Prophylaxis: Chemical  Influenza Vaccine screening completed? YES  Pneumococcal Vaccine screening completed? NO  Mobility needs: Yes   Nutrition plan:No  Consults:Case Management    Financial concerns:No  Escalated to CM? YES  RRAT Score: 15   Interventions:Palliative Care   Testing due for pt today?  NO  LOS: 11 days Expected length of stay 11 days  Discharge plan: Facility   PCP: Tiara Mcdowell MD  Transportation needs: Yes    Days before discharge:ready for discharge  Discharge disposition: Nursing Home    Signed:     Anival Hickey RN  9/2/2017  9:00 AM

## 2017-09-02 NOTE — DISCHARGE SUMMARY
Discharge Summary     PATIENT ID: Bryn Evans  MRN: [id number]   YOB: 1933    DATE OF ADMISSION: 8/22/2017  9:43 AM    DATE OF DISCHARGE: 9/2/2017    PRIMARY CARE PROVIDER: Charles Narvaez MD       ATTENDING PHYSICIAN: Remigio Alarcon MD  DISCHARGING PROVIDER: Remigio Alarcon MD     To contact this individual call [phone] and ask the  to page. If unavailable ask to be transferred the Adult Hospitalist Department. CONSULTATIONS: IP CONSULT TO HOSPITALIST  IP CONSULT TO NEPHROLOGY  IP CONSULT TO NEPHROLOGY  IP CONSULT TO INTERVENTIONAL RADIOLOGY  IP CONSULT TO PALLIATIVE CARE - PROVIDER  IP CONSULT TO PALLIATIVE CARE - PROVIDER  IP CONSULT TO INFECTIOUS DISEASES  IP CONSULT TO GENERAL SURGERY    PROCEDURES/SURGERIES: * No surgery found *    [id number] [facility] COURSE:     Admission Summary:      The patient is an 58-year-old patient who originally was a resident of [place], [place]. He   was admitted in [facility] on 06/04/2017 due to hypotension, hyperventilation and confusion. Since the patient was unable to be extubated while in the care in the ICU, then tracheostomy and PEG was done and [facility] he had transferred to skilled nursing facility where he came from this admission. On the day of admission,in the morning the patient was noted to be in severe  shortness of breath, for which we will transfer the patient to our ER. While in our ER, the patient was connected to the ventilator through the tracheostomy. Subsequently he was diagnosed with wide spread ESBL E coli infection that involved the blood(bacteremia), lungs and urine. UTI being the most probable source. Assessment & Plan:      Sepsis with septic shock with ESBL E coli bacteremia,pneumoni and UTI. The UTI the most probable source. -Patient required ICU care. Shock resolved and was transferred to a tele floor where he stayed to the date of discharge.  -Infectious disease specialist recommended to continue intravenous antibiotics with Ertapenem. Repeat blood culture negative thus far  -Midline was placed on 9/1/17,a day prior to discharge. SNF  IV Orders  1. Diagnosis:  Bacteremia  2. Routine PICC care  3. Antibiotic:  Ertapenem 500 mg IV Q 24 hours  4. Lab each Monday:                        CBC/diff/platelets  5. Lab each Thursday:                        CBC/diff/platelets  6. Fax lab to Dr. Maricel Adan @ [phone].  7.  Duration of therapy: 13 days and then remove PICC  Kyra Olmos MD       ESBL E coli pneumonia: see above      Acute and chronic renal failure,due to hypotension from sepsis/ATN  -Patient required urgent dialysis on 8.22.17 and again on 8.24.17.Rusty placed on 8.22.17. After nephrology and palliative care discussions with patient's wife,no dialysis or escalation of care so dialysis therapy was stopped. Creatinine has been rising since and wife would consider hospice once in skilled nursing place. Hyperkalemic, hypernatremic, dehydration   Improved. Acute on chronic hypoxic Respiratory failure likely due to sepsis/shock/pneumonia  -Patient had tracheostomy prior to admission and was put on mechanical ventilation on admission. He was subsequently extubated to Trach collar and remained stable respirator wise on trach collar. Coronary artery disease   Status post CABG. No acute issue. Decubital ulcer -PROVIDENCIA STUARTII    Also sensitive to meropenem. Surgical services consulted, no debridement planned per surgery. Anemia   Possibly from chronic disease monitor,he had one unit on 8/22. Hb stable between 7-8 range. Severe Protein Calorie Malnutrition  -On Tube feeding      Code status: DNR    Prognosis:poor. With multiple several co morbidities,recent extensive hospitalization,now failing kidneys without signs of recovery. There was extensive discussion about course and prognosis with patient's wife by primary team, nephrologist and palliative care team,the goals of care moving forward are,per palliative care team  \"Goals clear for SNF/NH w/ IV abx until course completed and then enroll in hospice. \"        DISCHARGE DIAGNOSES / PLAN:      See above       PENDING TEST RESULTS:   At the time of discharge the following test results are still pending: final blood culture,negative to date for 3 days    FOLLOW UP APPOINTMENTS:    Follow-up Information     Follow up With Details Comments [address] [place], [place] [address]MD Yoselyn 142  [phone]             ADDITIONAL CARE RECOMMENDATIONS:     DIET: NPO,on tube feeding via PEG tube      TUBE FEEDING INSTRUCTIONS:Suplena @ 38ml/hr continuous around the clock + 160ml q4hr of free water flush via PEG    OXYGEN / BiPAP SETTINGS via tach collar    ACTIVITY: Activity as tolerated    WOUND CARE:      1. POA, right upper back unstageable pressure injury (wife reports it has been present for about 4 months) = 1.5 x 1.3 x 0.1 cm  100% moist adherent yellow. no exudate. Periwound intact & without erythema. Cleaned with saline, Santyl and Mepilex border applied. 2. POA, sacral stage 4 pressure injury = 7.5 x 8 x 1 cm with undermining from 8-12 o'clock = 2.5 cm Base is 70% moist deep/bright pink 30% scattered yellow with some connective tissue noted. No erupting bone palpated but not far from the surface. No odor. Periwound intact and shiny, blanching pink. Scant serusanguious exudate. Devitalized margin proximally. Cleaned with saline, Santyl applied to base and then moist packing placed. Covered with large adhesive island dressing to protect form stool . 3. Left dorsal wrist skin tear = 2.5 x 2 x 0.1 cm 100% moist pink with scant bleeding. Cleaned with saline, petroleum ointment applied and covered with Mepilex border.      4. Chest toward left side, ulceration with unknown etiology (sternal wires are readily palpable under skin) = 1.3 x 1.3 x 0.2 cm 100% moist red with scant bleeding. Periwound intact and without erythema. Cleaned with saline, Aquacel Ag applied with a few drops of saline to activate silver, covered with Mepilex border. Recommendations:    Back and sacrum: apply Santyl daily. Cover back with Mepilex border and sacrum with moist packing and dry cover. Chest: apply Aquacel Ag every three days with a few drops of saline to moisten, dry cover. Left wrist: every three days apply petroleum ointment and Mepilex border     Skin Care & Pressure Prevention:  Turn/reposition approximately every 2 hours and offload heels. Manage incontinence  Total Care Sport: Use only flat sheet and one incontinence pad. DISCHARGE MEDICATIONS:   See Medication Reconciliation Form      NOTIFY YOUR PHYSICIAN FOR ANY OF THE FOLLOWING:   Fever over 101 degrees for 24 hours. Chest pain, shortness of breath, fever, chills, nausea, vomiting, diarrhea, change in mentation, falling, weakness, bleeding. Severe pain or pain not relieved by medications. Or, any other signs or symptoms that you may have questions about. DISPOSITION:    Home With:   OT  PT  HH  RN      x SNF/Inpatient Rehab/LTAC    Independent/assisted living    Hospice    Other:       PATIENT CONDITION AT DISCHARGE:     Functional status   x Poor     Deconditioned     Independent      Cognition     Lucid    x Forgetful     Dementia      Catheters/lines (plus indication)   x Rizo    x Midline   x PEG     None      Code status     Full code    x DNR      PHYSICAL EXAMINATION AT DISCHARGE:         Constitutional: Awake,flat. Not in distress   ENT:  Trach collar in place. Resp:  CTA bilaterally. No wheezing/rhonchi/rales. No accessory muscle use   CV:  Regular rhythm, normal rate, no murmurs, gallops, rubs    GI:  Soft, non distended, non tender.  normoactive bowel sounds, no hepatosplenomegaly   PEG in place. Musculoskeletal:  No edema, warm, 2+ pulses throughout   Skin  multiple ulcers. Please see above    Neurologic: Awake,flat                                             Eyes:  EOMI. Anicteric sclerae, PERRL. CHRONIC MEDICAL DIAGNOSES:  Problem List as of 9/2/2017  Never Reviewed          Codes Class Noted - Resolved    RESOLVED: UTI (urinary tract infection) due to urinary indwelling catheter ([facility] ICD-10-CM: T83.511A, N39.0  ICD-9-CM: 996.64, 599.0  8/22/2017 - 9/2/2017        RESOLVED: Sepsis affecting skin ICD-10-CM: L02.91  ICD-9-CM: 682.9  8/22/2017 - 9/2/2017              DISCHARGE MEDICATIONS:  Current Discharge Medication List      CONTINUE these medications which have NOT CHANGED    Details   promethazine (PHENERGAN) 25 mg suppository Insert 25 mg into rectum every four (4) hours as needed for Nausea. bisacodyl (BISCOLAX) 10 mg suppository Insert 10 mg into rectum daily as needed. acetaminophen (TYLENOL) 650 mg suppository Insert 650 mg into rectum every four (4) hours as needed for Fever. chlorhexidine (PERIDEX) 0.12 % solution 15 mL by Swish and Spit route two (2) times daily as needed (oral care). aspirin 81 mg chewable tablet 81 mg by PEG Tube route daily. atorvastatin (LIPITOR) 40 mg tablet 40 mg by PEG Tube route nightly. Indications: hyperlipidemia      albuterol-ipratropium (DUO-NEB) 2.5 mg-0.5 mg/3 ml nebu 3 mL by Nebulization route every six (6) hours. Indications: CHRONIC OBSTRUCTIVE PULMONARY DISEASE WITH BRONCHOSPASMS      esomeprazole (NEXIUM PACKET) 40 mg granules for oral suspension 40 mg by PEG Tube route daily. Indications: gastroesophageal reflux disease      MORPHINE SULFATE (ROXANOL CONCENTRATE PO) 5 mg by SubLINGual route every one (1) hour as needed for Pain (pain or sob). LORazepam (ATIVAN) 0.5 mg tablet 0.5 mg by PEG Tube route every four (4) hours as needed for Anxiety.       atropine 1 % ophthalmic solution 2 Drops by SubLINGual route every one (1) hour as needed (secreations). insulin regular (NOVOLIN R, HUMULIN R) 100 unit/mL injection by SubCUTAneous route. 151-200 = 2 units  201-250 = 4 units  251-300 = 6 units  301-350 = 9 units  351-400 = 10 units           CHRONIC MEDICAL DIAGNOSES:  Problem List as of 9/2/2017  Never Reviewed          Codes Class Noted - Resolved    RESOLVED: UTI (urinary tract infection) due to urinary indwelling catheter ([facility] ICD-10-CM: T83.511A, N39.0  ICD-9-CM: 996.64, 599.0  8/22/2017 - 9/2/2017        RESOLVED: Sepsis affecting skin ICD-10-CM: L02.91  ICD-9-CM: 682.9  8/22/2017 - 9/2/2017              Greater than 40 minutes were spent with the patient on counseling and coordination of care    Signed:    Savannah Flores MD  9/2/2017  8:31 AM

## 2017-09-02 NOTE — PROGRESS NOTES
Problem: Falls - Risk of  Goal: *Absence of Falls  Document James Fall Risk and appropriate interventions in the flowsheet.    Outcome: Progressing Towards Goal  Fall Risk Interventions:  Mobility Interventions: Communicate number of staff needed for ambulation/transfer     Mentation Interventions: Reorient patient     Medication Interventions: Utilize gait belt for transfers/ambulation     Elimination Interventions: Call light in reach

## 2017-09-02 NOTE — PROGRESS NOTES
Pt is to be discharged today. CM called [facility] today and spoke with nursing supervisor. She is unaware of this admission. She is going to check on this and get back with this CM .  ANTHONY Leonardo,ACM-SW

## 2017-09-04 LAB
BACTERIA SPEC CULT: NORMAL
SERVICE CMNT-IMP: NORMAL

## 2018-11-19 NOTE — PROGRESS NOTES
Received consult for hospice placement. No family here with pt at present time. Attempted to reach spouse by phone, [phone], but received VM. Message left on VM to return call to this CM.  at [facility] person, Angely Rapp, [phone], came by to see pt and talked to CM. She stated that pt can return to the facility if the family so desires. The pt was there under hospice.      Felix Valenzuela RN ACM CRM no

## 2024-06-03 NOTE — PROGRESS NOTES
Discussed pt on rounds, pt remains acute, awaiting further testing, pending eeg, pending MRI head. CM will continue to collaborate with interdisciplinary team and remain available to assist.  RENAL  PROGRESS NOTE        Subjective:    COMPLAINT: extubated, non verbal. Wife at the bedside. I told her that I see no role for dialysis. Given his age and very, very frail condition, dialysis will not positively effect mortality and will only worsen his poor quality of life. We should focus on comfort. She was tearful, but seemed to be in agreement. Objective:   VITALS SIGNS:    Visit Vitals    BP 97/52    Pulse 86    Temp 98.3 °F (36.8 °C)    Resp 26    Ht 6' (1.829 m)    Wt 65.2 kg (143 lb 11.8 oz)    SpO2 100%    BMI 19.49 kg/m2       O2 Device: Tracheal collar   O2 Flow Rate (L/min): 8 l/min   Temp (24hrs), Av.2 °F (36.8 °C), Min:97.6 °F (36.4 °C), Max:98.6 °F (37 °C)         PHYSICAL EXAM:  No edema  Open eyes  abd soft    DATA REVIEW:     INTAKE / OUTPUT:   Last shift:      701 - 1900  In: 240   Out: 20 [Urine:20]  Last 3 shifts: 1901 -  0700  In: 3652.8 [I.V.:902.8]  Out: 1087 [Urine:1087]    Intake/Output Summary (Last 24 hours) at 17 0907  Last data filed at 17 0800   Gross per 24 hour   Intake          2242.79 ml   Output              677 ml   Net          1565.79 ml         LABS:   Recent Labs      17   0450  17   0404  17   0432   WBC  10.1  10.2  10.2   HGB  8.1*  8.0*  7.9*   HCT  25.4*  25.7*  25.2*   PLT  185  193  178     Recent Labs      17   0450  17   0404  17   0432   NA  135*  135*  138   K  3.2*  3.0*  3.5   CL  98  99  103   CO2    29   GLU  132*  128*  93   BUN  46*  36*  29*   CREA  3.49*  2.64*  1.91*   CA  7.7*  7.2*  7.4*   MG  1.8   --    --    PHOS  4.7   --    --            Assessment:   FREDERICK, likely septic ATN/hypotension with chronic gutierrez, s.p urgent dialysis on 17 and again on 17  kimberly on 17  CKD ?  Baseline  prostate CA as per wife  Severe hyperkalemia, resolved s.p urgent dialysis  Septic shock;gram neg rods bacteremia likely from urospesis  VDRF  Cachexia  Anemia as per first team  Plan:        recommend hospice       No HD needed today and I do not think Mr. Manson Gitelman is a candidate for further dialysis given his advanced age and very frail state. No further HD from my standpoint. Focus on comfort.         Corrie Aranda MD